# Patient Record
Sex: MALE | Race: WHITE | NOT HISPANIC OR LATINO | Employment: OTHER | URBAN - METROPOLITAN AREA
[De-identification: names, ages, dates, MRNs, and addresses within clinical notes are randomized per-mention and may not be internally consistent; named-entity substitution may affect disease eponyms.]

---

## 2017-02-10 ENCOUNTER — TELEPHONE (OUTPATIENT)
Dept: UROLOGY | Facility: CLINIC | Age: 57
End: 2017-02-10

## 2017-02-10 NOTE — TELEPHONE ENCOUNTER
Attempted to contact pt to change appt from [facility] to [facility] as Urology will no longer rotate to [facility]; no answer; msg left on voice mail.

## 2017-05-05 ENCOUNTER — LAB VISIT (OUTPATIENT)
Dept: LAB | Facility: HOSPITAL | Age: 57
End: 2017-05-05
Attending: UROLOGY
Payer: OTHER GOVERNMENT

## 2017-05-05 ENCOUNTER — OFFICE VISIT (OUTPATIENT)
Dept: UROLOGY | Facility: CLINIC | Age: 57
End: 2017-05-05
Payer: OTHER GOVERNMENT

## 2017-05-05 VITALS
HEIGHT: 69 IN | SYSTOLIC BLOOD PRESSURE: 112 MMHG | DIASTOLIC BLOOD PRESSURE: 74 MMHG | BODY MASS INDEX: 30.72 KG/M2 | WEIGHT: 207.44 LBS

## 2017-05-05 DIAGNOSIS — Z12.5 PROSTATE CANCER SCREENING: Primary | ICD-10-CM

## 2017-05-05 DIAGNOSIS — Z12.5 PROSTATE CANCER SCREENING: ICD-10-CM

## 2017-05-05 LAB
BILIRUB SERPL-MCNC: NORMAL MG/DL
BLOOD URINE, POC: NORMAL
COLOR, POC UA: YELLOW
COMPLEXED PSA SERPL-MCNC: 1.2 NG/ML
GLUCOSE UR QL STRIP: NORMAL
KETONES UR QL STRIP: NORMAL
LEUKOCYTE ESTERASE URINE, POC: NORMAL
NITRITE, POC UA: NORMAL
PH, POC UA: 6
PROTEIN, POC: NORMAL
SPECIFIC GRAVITY, POC UA: 1.01
UROBILINOGEN, POC UA: NORMAL

## 2017-05-05 PROCEDURE — 99212 OFFICE O/P EST SF 10 MIN: CPT | Mod: PBBFAC | Performed by: UROLOGY

## 2017-05-05 PROCEDURE — 99244 OFF/OP CNSLTJ NEW/EST MOD 40: CPT | Mod: S$PBB,,, | Performed by: UROLOGY

## 2017-05-05 PROCEDURE — 81002 URINALYSIS NONAUTO W/O SCOPE: CPT | Mod: PBBFAC | Performed by: UROLOGY

## 2017-05-05 PROCEDURE — 84153 ASSAY OF PSA TOTAL: CPT

## 2017-05-05 PROCEDURE — 36415 COLL VENOUS BLD VENIPUNCTURE: CPT

## 2017-05-05 PROCEDURE — 99999 PR PBB SHADOW E&M-EST. PATIENT-LVL II: CPT | Mod: PBBFAC,,, | Performed by: UROLOGY

## 2017-05-05 NOTE — PROGRESS NOTES
Chief Complaint: Prostate Cancer Screening    HPI:   5/5/17: 55 yo man referred by Dr. Dominguez after abnormal YVONNE.  No abd/pelvic pain and no exac/rel factors.  No hematuria.  No urolithiasis.  No urinary bother.  No  history.  Normal sexual function.    Allergies:  Review of patient's allergies indicates no known allergies.    Medications:  currently has no medications in their medication list.    Review of Systems:  General: No fever, chills, fatigability, or weight loss.  Skin: No rashes, itching, or changes in color or texture of skin.  Chest: Denies DONALDSON, cyanosis, wheezing, cough, and sputum production.  Abdomen: Appetite fine. No weight loss. Denies diarrhea, abdominal pain, hematemesis, or blood in stool.  Musculoskeletal: No joint stiffness or swelling. Denies back pain.  : As above.  All other review of systems negative.    PMH:   has a past medical history of Colon polyp and Hyperlipidemia.    PSH:   has a past surgical history that includes Anterior cruciate ligament repair (Right, 1999); Finger fracture surgery; and Colonoscopy (N/A, 12/6/2016).    FamHx: family history includes Cerebral aneurysm in his mother; Skin cancer in his mother.    SocHx:  reports that he has never smoked. He does not have any smokeless tobacco history on file. He reports that he drinks alcohol. He reports that he does not use illicit drugs.      Physical Exam:  Vitals:    05/05/17 1447   BP: 112/74     General: A&Ox3, no apparent distress, no deformities  Neck: No masses, normal thyroid  Lungs: normal inspiration, no use of accessory muscles  Heart: normal pulse, no arrhythmias  Abdomen: Soft, NT, ND, no masses, no hernias, no hepatosplenomegaly  Lymphatic: Neck and groin nodes negative  Skin: The skin is warm and dry. No jaundice.  Ext: No c/c/e.  : Test desc debra, no abnormalities of epididymus. Penis normal, with normal penile and scrotal skin. Meatus normal. Normal rectal tone, no hemorrhoids. Prost 30 gm no nodules or  masses appreciated. Does have a more firm feature to the right side and kind of a wing to it. SV not palpable. Perineum and anus normal.    Labs/Studies: Urinalysis performed in clinic, summary: UA normal  PSA    11/16: 1.2    Impression/Plan:   1. Not convinced there is a nodule needing biopsy.  Will get PSA now and in 6 mo with YVONNE then to see if any different.

## 2017-05-05 NOTE — MR AVS SNAPSHOT
[facility]  [address]  [place] [address]  Phone: [phone]  Fax: [phone]                  Danny Chatman   2017 2:40 PM   Office Visit    Description:  Male : 1960   Provider:  Bryce Cid IV, MD   Department:  [facility]           Reason for Visit     Other           Diagnoses this Visit        Comments    Prostate cancer screening    -  Primary            To Do List           Future Appointments        Provider Department Dept Phone    2017 4:50 PM LABORATORY, [address] [facility] [phone]    2017 9:35 AM LABORATORY, [place] [facility] [phone]    2017 2:00 PM Bryce Cid IV, MD [facility] [phone]      Goals (5 Years of Data)     None      Follow-Up and Disposition     Return in about 6 months (around 2017).    Follow-up and Disposition History      [facility]     [facility] Nurse Care Line -  Assistance  Unless otherwise directed by your provider, please contact [facility], our nurse care line that is available for  assistance.     Registered nurses in the [facility] provide: appointment scheduling, clinical advisement, health education, and other advisory services.  Call: [phone] (toll free)               Medications           Message regarding Medications     Verify the changes and/or additions to your medication regime listed below are the same as discussed with your clinician today.  If any of these changes or additions are incorrect, please notify your healthcare provider.             Verify that the below list of medications is an accurate representation of the medications you are currently taking.  If none reported, the list may be blank. If incorrect, please contact your healthcare provider. Carry this list with you in case of emergency.                Clinical Reference Information           Your Vitals Were  "    BP Height Weight BMI       112/74 (BP Location: Left arm, Patient Position: Sitting) 5' 9" (1.753 m) 94.1 kg (207 lb 7.3 oz) 30.64 kg/m2       Blood Pressure          Most Recent Value    BP  112/74      Allergies as of 5/5/2017     No Known Allergies      Immunizations Administered on Date of Encounter - 5/5/2017     None      Orders Placed During Today's Visit     Future Labs/Procedures Expected by Expires    PSA, Screening  5/5/2018 7/4/2018    PSA, Screening  5/5/2018 7/4/2018      Language Assistance Services     ATTENTION: Language assistance services are available, free of charge. Please call [phone].      ATENCIÓN: Si dionnela keely, tiene a osei disposición servicios gratuitos de asistencia lingüística. Llame al [phone].     CHÚ Ý: N?u b?n nói Ti?ng Vi?t, có các d?ch v? h? tr? ngôn ng? mi?n phí dành cho b?n. G?i s? [phone].         [facility] complies with applicable Federal civil rights laws and does not discriminate on the basis of race, color, national origin, age, disability, or sex.        "

## 2017-05-05 NOTE — LETTER
May 5, 2017      Everett Dominguez MD  [address] [address] [address]           [facility]  [address] [place] [address]  Phone: [phone]  Fax: [phone]          Patient: Danny Chatman   MR Number: [id number]   YOB: 1960   Date of Visit: 5/5/2017       Dear Dr. Everett Dominguez:    Thank you for referring Danny Chatman to me for evaluation. Attached you will find relevant portions of my assessment and plan of care.    If you have questions, please do not hesitate to call me. I look forward to following Danny Chatman along with you.    Sincerely,    Bryce Cid IV, MD    Enclosure  CC:  No Recipients    If you would like to receive this communication electronically, please contact [email or web address] or [phone] to request more information on [employer] Link access.    For providers and/or their staff who would like to refer a patient to [facility], please contact us through our one-stop-shop provider referral line, [facility] , at [phone].    If you feel you have received this communication in error or would no longer like to receive these types of communications, please e-mail [email or web address]

## 2017-11-02 ENCOUNTER — PATIENT MESSAGE (OUTPATIENT)
Dept: UROLOGY | Facility: CLINIC | Age: 57
End: 2017-11-02

## 2017-11-10 ENCOUNTER — LAB VISIT (OUTPATIENT)
Dept: LAB | Facility: HOSPITAL | Age: 57
End: 2017-11-10
Attending: UROLOGY
Payer: OTHER GOVERNMENT

## 2017-11-10 DIAGNOSIS — Z12.5 PROSTATE CANCER SCREENING: ICD-10-CM

## 2017-11-10 LAB — COMPLEXED PSA SERPL-MCNC: 1 NG/ML

## 2017-11-10 PROCEDURE — 36415 COLL VENOUS BLD VENIPUNCTURE: CPT | Mod: PO

## 2017-11-10 PROCEDURE — 84153 ASSAY OF PSA TOTAL: CPT

## 2017-11-14 ENCOUNTER — OFFICE VISIT (OUTPATIENT)
Dept: UROLOGY | Facility: CLINIC | Age: 57
End: 2017-11-14
Payer: OTHER GOVERNMENT

## 2017-11-14 VITALS
SYSTOLIC BLOOD PRESSURE: 100 MMHG | HEART RATE: 70 BPM | BODY MASS INDEX: 30.33 KG/M2 | DIASTOLIC BLOOD PRESSURE: 88 MMHG | HEIGHT: 69 IN | WEIGHT: 204.81 LBS

## 2017-11-14 DIAGNOSIS — Z12.5 PROSTATE CANCER SCREENING: Primary | ICD-10-CM

## 2017-11-14 LAB
BILIRUB SERPL-MCNC: NORMAL MG/DL
BLOOD URINE, POC: NORMAL
COLOR, POC UA: YELLOW
GLUCOSE UR QL STRIP: NORMAL
KETONES UR QL STRIP: NORMAL
LEUKOCYTES: NORMAL
NITRITE, POC UA: NORMAL
PH, POC UA: 6
PROTEIN, POC: NORMAL
SPECIFIC GRAVITY, POC UA: 1.02
UROBILINOGEN, POC UA: NORMAL

## 2017-11-14 PROCEDURE — 81002 URINALYSIS NONAUTO W/O SCOPE: CPT | Mod: PBBFAC | Performed by: NURSE PRACTITIONER

## 2017-11-14 PROCEDURE — 99999 PR PBB SHADOW E&M-EST. PATIENT-LVL III: CPT | Mod: PBBFAC,,, | Performed by: NURSE PRACTITIONER

## 2017-11-14 PROCEDURE — 99213 OFFICE O/P EST LOW 20 MIN: CPT | Mod: S$PBB,,, | Performed by: NURSE PRACTITIONER

## 2017-11-14 PROCEDURE — 99213 OFFICE O/P EST LOW 20 MIN: CPT | Mod: PBBFAC | Performed by: NURSE PRACTITIONER

## 2017-11-14 NOTE — PROGRESS NOTES
Chief Complaint: Prostate Cancer Screening    HPI:   11/14/17: Laws: 55 y/o M patient of Dr. Cid here for 6 month f/u of abnormal prostate exam. Reviewed PSA trend; normal numbers (1.2, 1.0). Has slow stream at times. Nocturia 1-2x. No abd/pelvic pain and no exac/rel factors.  No hematuria.  No urolithiasis.    5/5/17: 57 yo man referred by Dr. Dominguez after abnormal YVONNE.  No abd/pelvic pain and no exac/rel factors.  No hematuria.  No urolithiasis.  No urinary bother.  No  history.  Normal sexual function.    Allergies:  Patient has no known allergies.    Medications:  currently has no medications in their medication list.    Review of Systems:  General: No fever, chills, fatigability, or weight loss.  Skin: No rashes, itching, or changes in color or texture of skin.  Chest: Denies DONALDSON, cyanosis, wheezing, cough, and sputum production.  Abdomen: Appetite fine. No weight loss. Denies diarrhea, abdominal pain, hematemesis, or blood in stool.  Musculoskeletal: No joint stiffness or swelling. Denies back pain.  : As above.  All other review of systems negative.    PMH:   has a past medical history of Colon polyp and Hyperlipidemia.    PSH:   has a past surgical history that includes Anterior cruciate ligament repair (Right, 1999); Finger fracture surgery; and Colonoscopy (N/A, 12/6/2016).    FamHx: family history includes Cerebral aneurysm in his mother; Skin cancer in his mother.    SocHx:  reports that he has never smoked. He does not have any smokeless tobacco history on file. He reports that he drinks alcohol. He reports that he does not use drugs.      Physical Exam:  There were no vitals filed for this visit.  General: A&Ox3, no apparent distress, no deformities  Neck: No masses, normal thyroid  Lungs: normal inspiration, no use of accessory muscles  Heart: normal pulse, no arrhythmias  Abdomen: Soft, NT, ND, no masses, no hernias, no hepatosplenomegaly  Lymphatic: Neck and groin nodes negative  Skin: The  skin is warm and dry. No jaundice.  Ext: No c/c/e.  : 11/14/17: Test desc debra, no abnormalities of epididymus. Penis normal, with normal penile and scrotal skin. Meatus normal. Normal rectal tone, no hemorrhoids. Prost 30 gm no nodules or masses appreciated. Does have a more firm feature to the right side and kind of a wing to it. SV not palpable. Perineum and anus normal.    Labs/Studies: Urinalysis performed in clinic, summary: UA normal  PSA    11/16: 1.2    5/17: 1.2    11/17: 1.0    Impression/Plan:   1. Exam unchanged to me. Recommend PSA 6 months and RTC 6 months with Dr. Cid.

## 2018-01-24 ENCOUNTER — PATIENT OUTREACH (OUTPATIENT)
Dept: ADMINISTRATIVE | Facility: HOSPITAL | Age: 58
End: 2018-01-24

## 2018-01-25 ENCOUNTER — OFFICE VISIT (OUTPATIENT)
Dept: OPHTHALMOLOGY | Facility: CLINIC | Age: 58
End: 2018-01-25
Payer: OTHER GOVERNMENT

## 2018-01-25 ENCOUNTER — OFFICE VISIT (OUTPATIENT)
Dept: FAMILY MEDICINE | Facility: CLINIC | Age: 58
End: 2018-01-25
Payer: OTHER GOVERNMENT

## 2018-01-25 VITALS
WEIGHT: 211.56 LBS | SYSTOLIC BLOOD PRESSURE: 97 MMHG | TEMPERATURE: 98 F | DIASTOLIC BLOOD PRESSURE: 57 MMHG | BODY MASS INDEX: 31.33 KG/M2 | HEART RATE: 60 BPM | HEIGHT: 69 IN

## 2018-01-25 DIAGNOSIS — D31.32 CHOROIDAL NEVUS, LEFT: ICD-10-CM

## 2018-01-25 DIAGNOSIS — R39.89 ABNORMAL PROSTATE EXAM: ICD-10-CM

## 2018-01-25 DIAGNOSIS — H52.7 REFRACTIVE ERROR: ICD-10-CM

## 2018-01-25 DIAGNOSIS — D31.32 CHOROIDAL NEVUS OF LEFT EYE: ICD-10-CM

## 2018-01-25 DIAGNOSIS — H25.13 NUCLEAR SCLEROSIS, BILATERAL: ICD-10-CM

## 2018-01-25 DIAGNOSIS — Z01.00 ENCOUNTER FOR EYE EXAM: Primary | ICD-10-CM

## 2018-01-25 DIAGNOSIS — Z00.00 ROUTINE HISTORY AND PHYSICAL EXAMINATION OF ADULT: Primary | ICD-10-CM

## 2018-01-25 PROCEDURE — 90686 IIV4 VACC NO PRSV 0.5 ML IM: CPT | Mod: PBBFAC,PO

## 2018-01-25 PROCEDURE — 99999 PR PBB SHADOW E&M-EST. PATIENT-LVL II: CPT | Mod: PBBFAC,,, | Performed by: OPHTHALMOLOGY

## 2018-01-25 PROCEDURE — 99212 OFFICE O/P EST SF 10 MIN: CPT | Mod: PBBFAC,27,PO | Performed by: OPHTHALMOLOGY

## 2018-01-25 PROCEDURE — 90472 IMMUNIZATION ADMIN EACH ADD: CPT | Mod: PBBFAC,PO

## 2018-01-25 PROCEDURE — 92014 COMPRE OPH EXAM EST PT 1/>: CPT | Mod: S$PBB,,, | Performed by: OPHTHALMOLOGY

## 2018-01-25 PROCEDURE — 92015 DETERMINE REFRACTIVE STATE: CPT | Mod: ,,, | Performed by: OPHTHALMOLOGY

## 2018-01-25 PROCEDURE — 99396 PREV VISIT EST AGE 40-64: CPT | Mod: S$PBB,,, | Performed by: FAMILY MEDICINE

## 2018-01-25 PROCEDURE — 99213 OFFICE O/P EST LOW 20 MIN: CPT | Mod: PBBFAC,PO,25 | Performed by: FAMILY MEDICINE

## 2018-01-25 PROCEDURE — 99999 PR PBB SHADOW E&M-EST. PATIENT-LVL III: CPT | Mod: PBBFAC,,, | Performed by: FAMILY MEDICINE

## 2018-01-25 NOTE — PROGRESS NOTES
Patient presents physical exam.  Needs follow-up ophthalmology regarding choroidal nevus.  Has follow-up pending urology in the spring regarding mildly abnormal prostate exam with normal PSAs    Past Medical History:  Past Medical History:   Diagnosis Date    Cataract     Choroidal nevus of left eye 1/25/2018    Colon polyp     Hyperlipidemia      Past Surgical History:   Procedure Laterality Date    ANTERIOR CRUCIATE LIGAMENT REPAIR Right 1999    COLONOSCOPY N/A 12/6/2016    Procedure: COLONOSCOPY;  Surgeon: Lucho Vera MD;  Location: [facility];  Service: Endoscopy;  Laterality: N/A;    FINGER FRACTURE SURGERY      R index     Social History     Social History    Marital status:      Spouse name: N/A    Number of children: N/A    Years of education: N/A     Occupational History    Not on file.     Social History Main Topics    Smoking status: Never Smoker    Smokeless tobacco: Never Used    Alcohol use 0.0 oz/week      Comment: rare    Drug use: No    Sexual activity: Not on file     Other Topics Concern    Not on file     Social History Narrative    No narrative on file     Family History   Problem Relation Age of Onset    Skin cancer Mother     Cerebral aneurysm Mother     No Known Problems Maternal Grandmother     No Known Problems Maternal Grandfather     No Known Problems Paternal Grandmother     No Known Problems Paternal Grandfather     Amblyopia Neg Hx     Blindness Neg Hx     Cancer Neg Hx     Cataracts Neg Hx     Glaucoma Neg Hx     Diabetes Neg Hx     Hypertension Neg Hx     Macular degeneration Neg Hx     Retinal detachment Neg Hx     Strabismus Neg Hx     Stroke Neg Hx     Thyroid disease Neg Hx      No Known Allergies  No current outpatient prescriptions on file prior to visit.     No current facility-administered medications on file prior to visit.          ROS:  GENERAL: No fever, chills,  or significant weight changes.  HEENT: No headache or hearing  "complaints.  No dysphagia  Eyes: No vision complaints  CHEST: Denies DONALDSON, cyanosis, wheezing, cough and sputum production.  CARDIOVASCULAR: Denies chest pain, PND, orthopnea or reduced exercise tolerance.  ABDOMEN: Appetite fine. Denies diarrhea, abdominal pain, hematemesis or blood in stool.  URINARY: No flank pain, dysuria or hematuria.  MUSCULOSKELETAL: No warmth swelling or tenderness of the joints  NEUROLOGIC: No focal weakness numbness or paresthesia  PSYCHIATRIC: Denies depression        OBJECTIVE:   Vitals:    01/25/18 0948   BP: (!) 97/57   Pulse: 60   Temp: 97.8 °F (36.6 °C)   Weight: 95.9 kg (211 lb 8.5 oz)   Height: 5' 9" (1.753 m)       Wt Readings from Last 3 Encounters:   01/25/18 95.9 kg (211 lb 8.5 oz)   11/14/17 92.9 kg (204 lb 12.9 oz)   05/05/17 94.1 kg (207 lb 7.3 oz)       APPEARANCE: Well nourished, well developed, in no acute distress.    HEAD: Normocephalic.  Atraumatic.  No sinus tenderness.  EYES:   Right eye: Pupil reactive.  Conjunctiva clear.    Left eye: Pupil reactive.  Conjunctiva clear.    Both fundi:  Grossly normal to nondilated exam. EOMI.    EARS: TM's intact. Light reflex normal. No retraction or perforation.    NOSE:  clear.  MOUTH & THROAT:  No pharyngeal erythema or exudate. No lesions.  NECK: Supple. No bruits.  No JVD.  No cervical lymphadenopathy.  No thyromegaly.    CHEST: Breath sounds clear bilaterally.  Normal respiratory effort  CARDIOVASCULAR: Normal rate.  Regular rhythm.  No murmurs.  No rub.  No gallops.  ABDOMEN: Bowel sounds normal.  Soft.  No tenderness.  No organomegaly.  PERIPHERAL VASCULAR: No cyanosis.  No clubbing.  No edema.  NEUROLOGIC: No focal findings.  MENTAL STATUS: Alert.  Oriented x 3.        Danny was seen today for annual exam.    Diagnoses and all orders for this visit:    Routine history and physical examination of adult    Choroidal nevus of left eye  -     Ambulatory referral to Ophthalmology    Abnormal prostate exam  -     Ambulatory " referral to Urology    Other orders  -     Tdap Vaccine; Future  -     Influenza - Quadrivalent (3 years & older) (PF)  -     Tdap Vaccine    Anticipatory guidance: Don't smoke.  Healthy diet and regular exercise recommended.

## 2018-01-25 NOTE — LETTER
January 25, 2018      Everett Dominguez MD  [address] [address]           [facility]  [address] [facility] [address] [place] [address]  Phone: [phone]  Fax: [phone]          Patient: Danny Chatman   MR Number: [id number]   YOB: 1960   Date of Visit: 1/25/2018       Dear Dr. Everett Dominguez:    Thank you for referring Danny Chatman to me for evaluation. Attached you will find relevant portions of my assessment and plan of care.    If you have questions, please do not hesitate to call me. I look forward to following Danny Chatman along with you.    Sincerely,    Bethany Benjamin MD    Enclosure  CC:  No Recipients    If you would like to receive this communication electronically, please contact [email or web address] or [phone] to request more information on [employer] Link access.    For providers and/or their staff who would like to refer a patient to [facility], please contact us through our one-stop-shop provider referral line, [facility], at [phone].    If you feel you have received this communication in error or would no longer like to receive these types of communications, please e-mail [email or web address]          09-Dec-2017 21:05

## 2018-02-08 ENCOUNTER — OFFICE VISIT (OUTPATIENT)
Dept: FAMILY MEDICINE | Facility: CLINIC | Age: 58
End: 2018-02-08
Payer: OTHER GOVERNMENT

## 2018-02-08 VITALS
HEART RATE: 96 BPM | WEIGHT: 207 LBS | DIASTOLIC BLOOD PRESSURE: 70 MMHG | TEMPERATURE: 99 F | SYSTOLIC BLOOD PRESSURE: 121 MMHG | HEIGHT: 69 IN | BODY MASS INDEX: 30.66 KG/M2

## 2018-02-08 DIAGNOSIS — J06.9 UPPER RESPIRATORY TRACT INFECTION, UNSPECIFIED TYPE: ICD-10-CM

## 2018-02-08 DIAGNOSIS — J02.9 PHARYNGITIS, UNSPECIFIED ETIOLOGY: Primary | ICD-10-CM

## 2018-02-08 DIAGNOSIS — R68.83 CHILLS: ICD-10-CM

## 2018-02-08 DIAGNOSIS — R05.9 COUGH: ICD-10-CM

## 2018-02-08 LAB
FLUAV AG SPEC QL IA: NEGATIVE
FLUBV AG SPEC QL IA: NEGATIVE
SPECIMEN SOURCE: NORMAL

## 2018-02-08 PROCEDURE — 3008F BODY MASS INDEX DOCD: CPT | Mod: ,,, | Performed by: NURSE PRACTITIONER

## 2018-02-08 PROCEDURE — 87400 INFLUENZA A/B EACH AG IA: CPT | Mod: 59,PO

## 2018-02-08 PROCEDURE — 99999 PR PBB SHADOW E&M-EST. PATIENT-LVL III: CPT | Mod: PBBFAC,,, | Performed by: NURSE PRACTITIONER

## 2018-02-08 PROCEDURE — 99213 OFFICE O/P EST LOW 20 MIN: CPT | Mod: PBBFAC,PO | Performed by: NURSE PRACTITIONER

## 2018-02-08 PROCEDURE — 99213 OFFICE O/P EST LOW 20 MIN: CPT | Mod: S$PBB,,, | Performed by: NURSE PRACTITIONER

## 2018-02-08 RX ORDER — BENZONATATE 200 MG/1
200 CAPSULE ORAL 3 TIMES DAILY PRN
Qty: 30 CAPSULE | Refills: 0 | Status: SHIPPED | OUTPATIENT
Start: 2018-02-08 | End: 2018-02-18

## 2018-02-08 RX ORDER — BENZONATATE 200 MG/1
200 CAPSULE ORAL 3 TIMES DAILY PRN
Qty: 30 CAPSULE | Refills: 0 | Status: SHIPPED | OUTPATIENT
Start: 2018-02-08 | End: 2018-02-08 | Stop reason: SDUPTHER

## 2018-02-08 RX ORDER — PROMETHAZINE HYDROCHLORIDE AND DEXTROMETHORPHAN HYDROBROMIDE 6.25; 15 MG/5ML; MG/5ML
5 SYRUP ORAL 2 TIMES DAILY PRN
Qty: 118 ML | Refills: 0 | Status: SHIPPED | OUTPATIENT
Start: 2018-02-08 | End: 2018-02-18

## 2018-02-08 RX ORDER — PROMETHAZINE HYDROCHLORIDE AND DEXTROMETHORPHAN HYDROBROMIDE 6.25; 15 MG/5ML; MG/5ML
5 SYRUP ORAL 2 TIMES DAILY PRN
Qty: 118 ML | Refills: 0 | Status: SHIPPED | OUTPATIENT
Start: 2018-02-08 | End: 2018-02-08 | Stop reason: SDUPTHER

## 2018-02-08 RX ORDER — AZITHROMYCIN 250 MG/1
TABLET, FILM COATED ORAL
Qty: 6 TABLET | Refills: 0 | Status: SHIPPED | OUTPATIENT
Start: 2018-02-08 | End: 2018-02-13

## 2018-02-08 NOTE — PROGRESS NOTES
Subjective:       Patient ID: Danny Chatman is a 57 y.o. male.    Chief Complaint: Cough; Chills; Fever; Nasal Congestion; Chest Congestion; and Sore Throat    Cough   This is a new problem. The current episode started in the past 7 days. The problem has been unchanged. The problem occurs every few minutes. The cough is productive of sputum. Associated symptoms include chills, nasal congestion, postnasal drip, rhinorrhea and a sore throat. Pertinent negatives include no chest pain, ear congestion, ear pain, fever, headaches, heartburn, hemoptysis, myalgias, rash, shortness of breath, sweats, weight loss or wheezing. Nothing aggravates the symptoms. He has tried nothing for the symptoms. The treatment provided no relief. There is no history of asthma, bronchiectasis, bronchitis, COPD, emphysema, environmental allergies or pneumonia.     Past Medical History:   Diagnosis Date    Cataract     Choroidal nevus of left eye 1/25/2018    Colon polyp     Hyperlipidemia      Social History     Social History    Marital status:      Spouse name: N/A    Number of children: N/A    Years of education: N/A     Occupational History    Not on file.     Social History Main Topics    Smoking status: Never Smoker    Smokeless tobacco: Never Used    Alcohol use 0.0 oz/week      Comment: rare    Drug use: No    Sexual activity: Not on file     Social History Narrative    No narrative on file     Past Surgical History:   Procedure Laterality Date    ANTERIOR CRUCIATE LIGAMENT REPAIR Right 1999    COLONOSCOPY N/A 12/6/2016    Procedure: COLONOSCOPY;  Surgeon: Lucho Vera MD;  Location: [facility];  Service: Endoscopy;  Laterality: N/A;    FINGER FRACTURE SURGERY      R index       Review of Systems   Constitutional: Positive for chills. Negative for fever and weight loss.   HENT: Positive for postnasal drip, rhinorrhea and sore throat. Negative for ear pain.    Eyes: Negative.    Respiratory: Positive for  cough. Negative for hemoptysis, shortness of breath and wheezing.    Cardiovascular: Negative.  Negative for chest pain.   Gastrointestinal: Negative.  Negative for heartburn.   Endocrine: Negative.    Genitourinary: Negative.    Musculoskeletal: Negative.  Negative for myalgias.   Skin: Negative.  Negative for rash.   Allergic/Immunologic: Negative.  Negative for environmental allergies.   Neurological: Negative.  Negative for headaches.   Psychiatric/Behavioral: Negative.        Objective:      Physical Exam   Constitutional: He is oriented to person, place, and time. He appears well-developed and well-nourished.   HENT:   Head: Normocephalic.   Right Ear: Hearing, tympanic membrane, external ear and ear canal normal.   Left Ear: Hearing, tympanic membrane, external ear and ear canal normal.   Nose: Rhinorrhea present. Right sinus exhibits no maxillary sinus tenderness and no frontal sinus tenderness. Left sinus exhibits no maxillary sinus tenderness and no frontal sinus tenderness.   Mouth/Throat: Uvula is midline and mucous membranes are normal. Posterior oropharyngeal erythema present.   Eyes: Conjunctivae are normal. Pupils are equal, round, and reactive to light.   Neck: Normal range of motion. Neck supple.   Cardiovascular: Normal rate, regular rhythm and normal heart sounds.    Pulmonary/Chest: Effort normal and breath sounds normal.   Abdominal: Soft. Bowel sounds are normal.   Musculoskeletal: Normal range of motion.   Neurological: He is alert and oriented to person, place, and time.   Skin: Skin is warm and dry. Capillary refill takes 2 to 3 seconds.   Psychiatric: He has a normal mood and affect. His behavior is normal. Judgment and thought content normal.   Nursing note and vitals reviewed.      Assessment:       1. Pharyngitis, unspecified etiology    2. Cough    3. Chills    4.      Upper respiratory tract infection, unspecified type    Plan:           Danny was seen today for cough, chills,  fever, nasal congestion, chest congestion and sore throat.    Diagnoses and all orders for this visit:  Upper respiratory tract infection, unspecified type  Pharyngitis, unspecified etiology  Cough  Chills  -     Influenza antigen Nasal Swab  -     benzonatate (TESSALON) 200 MG capsule; Take 1 capsule (200 mg total) by mouth 3 (three) times daily as needed.  -     promethazine-dextromethorphan (PROMETHAZINE-DM) 6.25-15 mg/5 mL Syrp; Take 5 mLs by mouth 2 (two) times daily as needed.  -     azithromycin (Z-SARAH) 250 MG tablet; Take 2 tablets by mouth on day 1; Take 1 tablet by mouth on days 2-5  Hydrate well  Rest  Report to ER immediately if symptoms worsen

## 2018-04-07 ENCOUNTER — PATIENT MESSAGE (OUTPATIENT)
Dept: FAMILY MEDICINE | Facility: CLINIC | Age: 58
End: 2018-04-07

## 2018-04-07 ENCOUNTER — PATIENT MESSAGE (OUTPATIENT)
Dept: UROLOGY | Facility: CLINIC | Age: 58
End: 2018-04-07

## 2018-04-24 ENCOUNTER — PATIENT MESSAGE (OUTPATIENT)
Dept: FAMILY MEDICINE | Facility: CLINIC | Age: 58
End: 2018-04-24

## 2018-05-03 ENCOUNTER — TELEPHONE (OUTPATIENT)
Dept: FAMILY MEDICINE | Facility: CLINIC | Age: 58
End: 2018-05-03

## 2018-05-03 ENCOUNTER — LAB VISIT (OUTPATIENT)
Dept: LAB | Facility: HOSPITAL | Age: 58
End: 2018-05-03
Attending: UROLOGY
Payer: OTHER GOVERNMENT

## 2018-05-03 DIAGNOSIS — Z12.5 PROSTATE CANCER SCREENING: ICD-10-CM

## 2018-05-03 LAB — COMPLEXED PSA SERPL-MCNC: 1.2 NG/ML

## 2018-05-03 PROCEDURE — 36415 COLL VENOUS BLD VENIPUNCTURE: CPT | Mod: PO

## 2018-05-03 PROCEDURE — 84153 ASSAY OF PSA TOTAL: CPT

## 2018-05-03 NOTE — TELEPHONE ENCOUNTER
Wife informed referral is in EPIC but was not authorized, will send to referral department to authorize.  Informed anything ordered from visit with Dr Cid would have to be authorized by his staff.

## 2018-05-03 NOTE — TELEPHONE ENCOUNTER
----- Message from Kimberly Reynoso sent at 5/3/2018  8:37 AM CDT -----  Contact: pt spouse Caridad Mclean state the patient have  insurance and they are advising that they need a referral and authorization for patient lab appt on today and appt on 5/14/18 with Dr. Cid. Please adv/call [phone].[email or web address]

## 2018-05-09 ENCOUNTER — PATIENT MESSAGE (OUTPATIENT)
Dept: UROLOGY | Facility: CLINIC | Age: 58
End: 2018-05-09

## 2018-05-14 ENCOUNTER — OFFICE VISIT (OUTPATIENT)
Dept: UROLOGY | Facility: CLINIC | Age: 58
End: 2018-05-14
Payer: OTHER GOVERNMENT

## 2018-05-14 DIAGNOSIS — N40.0 BENIGN PROSTATIC HYPERPLASIA, UNSPECIFIED WHETHER LOWER URINARY TRACT SYMPTOMS PRESENT: Primary | ICD-10-CM

## 2018-05-14 LAB
BILIRUB SERPL-MCNC: NORMAL MG/DL
BLOOD URINE, POC: NORMAL
COLOR, POC UA: YELLOW
GLUCOSE UR QL STRIP: NORMAL
KETONES UR QL STRIP: NORMAL
LEUKOCYTE ESTERASE URINE, POC: NORMAL
NITRITE, POC UA: NORMAL
PH, POC UA: 5
PROTEIN, POC: NORMAL
SPECIFIC GRAVITY, POC UA: 1.02
UROBILINOGEN, POC UA: NORMAL

## 2018-05-14 PROCEDURE — 81002 URINALYSIS NONAUTO W/O SCOPE: CPT | Mod: PBBFAC | Performed by: UROLOGY

## 2018-05-14 PROCEDURE — 99214 OFFICE O/P EST MOD 30 MIN: CPT | Mod: S$PBB,,, | Performed by: UROLOGY

## 2018-05-14 PROCEDURE — 99999 PR PBB SHADOW E&M-EST. PATIENT-LVL I: CPT | Mod: PBBFAC,,, | Performed by: UROLOGY

## 2018-05-14 PROCEDURE — 99211 OFF/OP EST MAY X REQ PHY/QHP: CPT | Mod: PBBFAC | Performed by: UROLOGY

## 2018-05-14 NOTE — PROGRESS NOTES
Chief Complaint: Prostate Cancer Screening    HPI:   5/14/18: PSA unchanged, no new problems.  Reviewed history in detail.  Brother has prostate cancer, 5 yrs older; discussed.  11/14/17: Laws: 57 y/o M patient of Dr. Cid here for 6 month f/u of abnormal prostate exam. Reviewed PSA trend; normal numbers (1.2, 1.0). Has slow stream at times. Nocturia 1-2x. No abd/pelvic pain and no exac/rel factors.  No hematuria.  No urolithiasis.    5/5/17: 57 yo man referred by Dr. Dominguez after abnormal YVONNE.  No abd/pelvic pain and no exac/rel factors.  No hematuria.  No urolithiasis.  No urinary bother.  No  history.  Normal sexual function.    Allergies:  Patient has no known allergies.    Medications:  has a current medication list which includes the following prescription(s): multivit-min/fa/lycopen/lutein.    Review of Systems:  General: No fever, chills, fatigability, or weight loss.  Skin: No rashes, itching, or changes in color or texture of skin.  Chest: Denies DONALDSON, cyanosis, wheezing, cough, and sputum production.  Abdomen: Appetite fine. No weight loss. Denies diarrhea, abdominal pain, hematemesis, or blood in stool.  Musculoskeletal: No joint stiffness or swelling. Denies back pain.  : As above.  All other review of systems negative.    PMH:   has a past medical history of Cataract; Choroidal nevus of left eye (1/25/2018); Colon polyp; and Hyperlipidemia.    PSH:   has a past surgical history that includes Anterior cruciate ligament repair (Right, 1999); Finger fracture surgery; Colonoscopy (N/A, 12/6/2016); and Vasectomy (1989).    FamHx: family history includes Cerebral aneurysm in his mother; No Known Problems in his maternal grandfather, maternal grandmother, paternal grandfather, and paternal grandmother; Skin cancer in his mother.    SocHx:  reports that he has never smoked. He has never used smokeless tobacco. He reports that he drinks alcohol. He reports that he does not use drugs.      Physical  Exam:  There were no vitals filed for this visit.  General: A&Ox3, no apparent distress, no deformities  Neck: No masses, normal thyroid  Lungs: normal inspiration, no use of accessory muscles  Heart: normal pulse, no arrhythmias  Abdomen: Soft, NT, ND  Skin: The skin is warm and dry. No jaundice.  Ext: No c/c/e.  :   5/14/18: Test desc debra, no abnormalities of epididymus. Penis normal, with normal penile and scrotal skin. Meatus normal. Normal rectal tone, no hemorrhoids. Prost 30 gm no nodules or masses appreciated. Does have a more firm feature to the right side and kind of a wing to it. SV not palpable. Perineum and anus normal.    Labs/Studies: Urinalysis performed in clinic, summary: UA normal  PSA    11/16: 1.2    5/17: 1.2    11/17: 1.0    5/18: 1.2    Impression/Plan:   1. Exam unchanged to me, again.  PSA/RTC 12 mo.

## 2018-05-14 NOTE — LETTER
May 14, 2018      Everett Dominguez MD  [address] [facility] [address]           [facility] Urology  [address] [place] [address]  Phone: [phone]  Fax: [phone]          Patient: Danny Chatman   MR Number: [id number]   YOB: 1960   Date of Visit: 5/14/2018       Dear Dr. Everett Dominguez:    Thank you for referring Danny Chatman to me for evaluation. Attached you will find relevant portions of my assessment and plan of care.    If you have questions, please do not hesitate to call me. I look forward to following Danny Chatman along with you.    Sincerely,    Bryce Cid IV, MD    Enclosure  CC:  No Recipients    If you would like to receive this communication electronically, please contact [email or web address] or [phone] to request more information on [employer] Link access.    For providers and/or their staff who would like to refer a patient to [facility], please contact us through our one-stop-shop provider referral line, [facility] , at [phone].    If you feel you have received this communication in error or would no longer like to receive these types of communications, please e-mail [email or web address]

## 2018-05-16 ENCOUNTER — TELEPHONE (OUTPATIENT)
Dept: UROLOGY | Facility: CLINIC | Age: 58
End: 2018-05-16

## 2018-05-16 NOTE — TELEPHONE ENCOUNTER
----- Message from Tiera Douglass sent at 5/15/2018  5:19 PM CDT -----  Contact: Pt's Wife Radha [phone]  Pt s Wife Radha called to speak to the nurse regarding the pt's 5-15-18 appt. Mrs Garcia stated that the pt wasn't aware of the appt but was seen on 5/14/2018.    Mrs Garcia can be reached at [phone]    Thanks.    tms

## 2018-05-16 NOTE — TELEPHONE ENCOUNTER
Notified patient that he did not miss an appointment with Dr. Cid yesterday. The appointment is schedule for may 15th of 2019. Patient states understanding.

## 2019-02-21 ENCOUNTER — PATIENT MESSAGE (OUTPATIENT)
Dept: FAMILY MEDICINE | Facility: CLINIC | Age: 59
End: 2019-02-21

## 2019-02-21 ENCOUNTER — TELEPHONE (OUTPATIENT)
Dept: FAMILY MEDICINE | Facility: CLINIC | Age: 59
End: 2019-02-21

## 2019-02-21 DIAGNOSIS — R39.89 ABNORMAL PROSTATE EXAM: Primary | ICD-10-CM

## 2019-04-22 ENCOUNTER — PATIENT MESSAGE (OUTPATIENT)
Dept: FAMILY MEDICINE | Facility: CLINIC | Age: 59
End: 2019-04-22

## 2019-04-22 ENCOUNTER — PATIENT MESSAGE (OUTPATIENT)
Dept: UROLOGY | Facility: CLINIC | Age: 59
End: 2019-04-22

## 2019-07-11 ENCOUNTER — PATIENT OUTREACH (OUTPATIENT)
Dept: ADMINISTRATIVE | Facility: HOSPITAL | Age: 59
End: 2019-07-11

## 2019-07-11 ENCOUNTER — TELEPHONE (OUTPATIENT)
Dept: UROLOGY | Facility: CLINIC | Age: 59
End: 2019-07-11

## 2019-07-11 ENCOUNTER — PATIENT MESSAGE (OUTPATIENT)
Dept: ADMINISTRATIVE | Facility: HOSPITAL | Age: 59
End: 2019-07-11

## 2019-07-11 NOTE — TELEPHONE ENCOUNTER
----- Message from Dana Gama sent at 7/11/2019 11:15 AM CDT -----  Contact: Radha Chatman  States his insurance changed his primary care doctor, so he had to cancel his blood work.  Once he see's his new PCP he will be able to get the blood work done. He's going to keep his appt on 8/1. Please call Radha Chatman at [phone]. Thank clau

## 2019-07-11 NOTE — PROGRESS NOTES
Pt received message concerning not seen in 12 month.  Pt's insurance has change PCP to Dr. Crane and pt was all ready scheduled for 7/22/19.  PCP changed to Dr. Crane.

## 2019-07-22 ENCOUNTER — OFFICE VISIT (OUTPATIENT)
Dept: FAMILY MEDICINE | Facility: CLINIC | Age: 59
End: 2019-07-22
Payer: OTHER GOVERNMENT

## 2019-07-22 ENCOUNTER — LAB VISIT (OUTPATIENT)
Dept: LAB | Facility: HOSPITAL | Age: 59
End: 2019-07-22
Attending: FAMILY MEDICINE
Payer: OTHER GOVERNMENT

## 2019-07-22 VITALS
HEART RATE: 74 BPM | TEMPERATURE: 98 F | DIASTOLIC BLOOD PRESSURE: 59 MMHG | SYSTOLIC BLOOD PRESSURE: 94 MMHG | HEIGHT: 69 IN | WEIGHT: 199 LBS | BODY MASS INDEX: 29.47 KG/M2

## 2019-07-22 DIAGNOSIS — Z00.00 ROUTINE CHECK-UP: ICD-10-CM

## 2019-07-22 DIAGNOSIS — Z00.00 ROUTINE CHECK-UP: Primary | ICD-10-CM

## 2019-07-22 DIAGNOSIS — R39.89 ABNORMAL PROSTATE EXAM: ICD-10-CM

## 2019-07-22 DIAGNOSIS — L98.9 SKIN LESION: ICD-10-CM

## 2019-07-22 LAB
ALBUMIN SERPL BCP-MCNC: 3.8 G/DL (ref 3.5–5.2)
ALP SERPL-CCNC: 58 U/L (ref 55–135)
ALT SERPL W/O P-5'-P-CCNC: 23 U/L (ref 10–44)
ANION GAP SERPL CALC-SCNC: 8 MMOL/L (ref 8–16)
AST SERPL-CCNC: 24 U/L (ref 10–40)
BILIRUB SERPL-MCNC: 0.4 MG/DL (ref 0.1–1)
BUN SERPL-MCNC: 22 MG/DL (ref 8–23)
CALCIUM SERPL-MCNC: 9.4 MG/DL (ref 8.7–10.5)
CHLORIDE SERPL-SCNC: 106 MMOL/L (ref 95–110)
CHOLEST SERPL-MCNC: 202 MG/DL (ref 120–199)
CHOLEST/HDLC SERPL: 3.8 {RATIO} (ref 2–5)
CO2 SERPL-SCNC: 29 MMOL/L (ref 23–29)
COMPLEXED PSA SERPL-MCNC: 1.2 NG/ML (ref 0–4)
CREAT SERPL-MCNC: 1.4 MG/DL (ref 0.5–1.4)
EST. GFR  (AFRICAN AMERICAN): >60 ML/MIN/1.73 M^2
EST. GFR  (NON AFRICAN AMERICAN): 53.8 ML/MIN/1.73 M^2
GLUCOSE SERPL-MCNC: 90 MG/DL (ref 70–110)
HDLC SERPL-MCNC: 53 MG/DL (ref 40–75)
HDLC SERPL: 26.2 % (ref 20–50)
LDLC SERPL CALC-MCNC: 119.8 MG/DL (ref 63–159)
NONHDLC SERPL-MCNC: 149 MG/DL
POTASSIUM SERPL-SCNC: 4.1 MMOL/L (ref 3.5–5.1)
PROT SERPL-MCNC: 6.8 G/DL (ref 6–8.4)
SODIUM SERPL-SCNC: 143 MMOL/L (ref 136–145)
TRIGL SERPL-MCNC: 146 MG/DL (ref 30–150)

## 2019-07-22 PROCEDURE — 84153 ASSAY OF PSA TOTAL: CPT

## 2019-07-22 PROCEDURE — 80061 LIPID PANEL: CPT

## 2019-07-22 PROCEDURE — 80053 COMPREHEN METABOLIC PANEL: CPT

## 2019-07-22 PROCEDURE — 36415 COLL VENOUS BLD VENIPUNCTURE: CPT | Mod: PO

## 2019-07-22 PROCEDURE — 99999 PR PBB SHADOW E&M-EST. PATIENT-LVL III: CPT | Mod: PBBFAC,,, | Performed by: FAMILY MEDICINE

## 2019-07-22 PROCEDURE — 99999 PR PBB SHADOW E&M-EST. PATIENT-LVL III: ICD-10-PCS | Mod: PBBFAC,,, | Performed by: FAMILY MEDICINE

## 2019-07-22 PROCEDURE — 99396 PR PREVENTIVE VISIT,EST,40-64: ICD-10-PCS | Mod: S$PBB,,, | Performed by: FAMILY MEDICINE

## 2019-07-22 PROCEDURE — 99213 OFFICE O/P EST LOW 20 MIN: CPT | Mod: PBBFAC,PO | Performed by: FAMILY MEDICINE

## 2019-07-22 PROCEDURE — 99396 PREV VISIT EST AGE 40-64: CPT | Mod: S$PBB,,, | Performed by: FAMILY MEDICINE

## 2019-07-22 NOTE — PROGRESS NOTES
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       No current facility-administered medications on file prior to visit.      Social History     Socioeconomic History    Marital status:      Spouse name: Not on file    Number of children: Not on file    Years of education: Not on file    Highest education level: Not on file   Occupational History    Not on file   Social Needs    Financial resource strain: Not on file    Food insecurity:     Worry: Not on file     Inability: Not on file    Transportation needs:     Medical: Not on file     Non-medical: Not on file   Tobacco Use    Smoking status: Never Smoker    Smokeless tobacco: Never Used   Substance and Sexual Activity    Alcohol use: Yes     Alcohol/week: 0.0 oz     Comment: rare    Drug use: No    Sexual activity: Not on file   Lifestyle    Physical activity:     Days per week: Not on file     Minutes per session: Not on file    Stress: Not on file   Relationships    Social connections:     Talks on phone: Not on file     Gets together: Not on file     Attends Sabianism service: Not on file     Active member of club or organization: Not on file     Attends meetings of clubs or organizations: Not on file     Relationship status: Not on file   Other Topics Concern     Not on file   Social History Narrative    Not on file     Family History   Problem Relation Age of Onset    Skin cancer Mother     Cerebral aneurysm Mother     No Known Problems Maternal Grandmother     No Known Problems Maternal Grandfather     No Known Problems Paternal Grandmother     No Known Problems Paternal Grandfather     Amblyopia Neg Hx     Blindness Neg Hx     Cancer Neg Hx     Cataracts Neg Hx     Glaucoma Neg Hx     Diabetes Neg Hx     Hypertension Neg Hx     Macular degeneration Neg Hx     Retinal detachment Neg Hx     Strabismus Neg Hx     Stroke Neg Hx     Thyroid disease Neg Hx          ROS:GENERAL: No fever, chills, fatigability or weight loss.  SKIN: No rashes, itching or changes in color or texture of skin.  HEAD: No headaches or recent head trauma.EYES: Visual acuity fine. No photophobia, ocular pain or diplopia.EARS: Denies ear pain, discharge or vertigo.NOSE: No loss of smell, no epistaxis or postnasal drip.MOUTH & THROAT: No hoarseness or change in voice. No excessive gum bleeding.NODES: Denies swollen glands.  CHEST: Denies DONALDSON, cyanosis, wheezing, cough and sputum production.  CARDIOVASCULAR: Denies chest pain, PND, orthopnea or reduced exercise tolerance.  ABDOMEN: Appetite fine. No weight loss. Denies diarrhea, abdominal pain, hematemesis or blood in stool.  URINARY: No flank pain, dysuria or hematuria.  PERIPHERAL VASCULAR: No claudication or cyanosis.  MUSCULOSKELETAL: See above.  NEUROLOGIC: No history of seizures, paralysis, alteration of gait or coordination.  PE:    HEAD: Normocephalic, atraumatic.EYES: PERRL. EOMI.   EARS: TM's intact. Light reflex normal. No retraction or perforation.   NOSE: Mucosa pink. Airway clear.MOUTH & THROAT: No tonsillar enlargement. No pharyngeal erythema or exudate. No stridor.  NODES: No cervical, axillary or inguinal lymph node enlargement.  CHEST: Lungs clear to auscultation.  CARDIOVASCULAR: Normal S1, S2. No rubs, murmurs or  gallops.  ABDOMEN: Bowel sounds normal. Not distended. Soft. No tenderness or masses.  MUSCULOSKELETAL: No palpable abnormality  NEUROLOGIC: Cranial Nerves: II-XII grossly intact.  Motor: 5/5 strength major flexors/extensors.  DTR's: Knees, Ankles 2+ and equal bilaterally; downgoing toes.  Sensory: Intact to light touch distally.  Gait & Posture: Normal gait and fine motion. No cerebellar signs.     Impression:Routine health check  Plan:Lab eval  Rec diet and ex recs  Rev age appropriate screenings    There are no preventive care reminders to display for this patient.

## 2019-07-23 DIAGNOSIS — R79.89 ELEVATED SERUM CREATININE: Primary | ICD-10-CM

## 2019-07-31 ENCOUNTER — PATIENT MESSAGE (OUTPATIENT)
Dept: FAMILY MEDICINE | Facility: CLINIC | Age: 59
End: 2019-07-31

## 2019-07-31 ENCOUNTER — PATIENT MESSAGE (OUTPATIENT)
Dept: UROLOGY | Facility: CLINIC | Age: 59
End: 2019-07-31

## 2019-08-01 ENCOUNTER — LAB VISIT (OUTPATIENT)
Dept: LAB | Facility: HOSPITAL | Age: 59
End: 2019-08-01
Attending: FAMILY MEDICINE
Payer: OTHER GOVERNMENT

## 2019-08-01 ENCOUNTER — OFFICE VISIT (OUTPATIENT)
Dept: UROLOGY | Facility: CLINIC | Age: 59
End: 2019-08-01
Payer: OTHER GOVERNMENT

## 2019-08-01 VITALS
WEIGHT: 198.44 LBS | HEIGHT: 69 IN | HEART RATE: 97 BPM | BODY MASS INDEX: 29.39 KG/M2 | SYSTOLIC BLOOD PRESSURE: 118 MMHG | DIASTOLIC BLOOD PRESSURE: 74 MMHG

## 2019-08-01 DIAGNOSIS — Z12.5 PROSTATE CANCER SCREENING: Primary | ICD-10-CM

## 2019-08-01 DIAGNOSIS — R79.89 ELEVATED SERUM CREATININE: ICD-10-CM

## 2019-08-01 LAB
ANION GAP SERPL CALC-SCNC: 10 MMOL/L (ref 8–16)
BILIRUB SERPL-MCNC: NORMAL MG/DL
BLOOD URINE, POC: NORMAL
BUN SERPL-MCNC: 19 MG/DL (ref 8–23)
CALCIUM SERPL-MCNC: 9.2 MG/DL (ref 8.7–10.5)
CHLORIDE SERPL-SCNC: 107 MMOL/L (ref 95–110)
CO2 SERPL-SCNC: 23 MMOL/L (ref 23–29)
COLOR, POC UA: YELLOW
CREAT SERPL-MCNC: 1.1 MG/DL (ref 0.5–1.4)
EST. GFR  (AFRICAN AMERICAN): >60 ML/MIN/1.73 M^2
EST. GFR  (NON AFRICAN AMERICAN): >60 ML/MIN/1.73 M^2
GLUCOSE SERPL-MCNC: 95 MG/DL (ref 70–110)
GLUCOSE UR QL STRIP: NORMAL
KETONES UR QL STRIP: NORMAL
LEUKOCYTE ESTERASE URINE, POC: NORMAL
NITRITE, POC UA: NORMAL
PH, POC UA: 5
POTASSIUM SERPL-SCNC: 3.9 MMOL/L (ref 3.5–5.1)
PROTEIN, POC: NORMAL
SODIUM SERPL-SCNC: 140 MMOL/L (ref 136–145)
SPECIFIC GRAVITY, POC UA: 1.02
UROBILINOGEN, POC UA: NORMAL

## 2019-08-01 PROCEDURE — 36415 COLL VENOUS BLD VENIPUNCTURE: CPT | Mod: PO

## 2019-08-01 PROCEDURE — 99213 PR OFFICE/OUTPT VISIT, EST, LEVL III, 20-29 MIN: ICD-10-PCS | Mod: S$PBB,,, | Performed by: UROLOGY

## 2019-08-01 PROCEDURE — 99999 PR PBB SHADOW E&M-EST. PATIENT-LVL III: ICD-10-PCS | Mod: PBBFAC,,, | Performed by: UROLOGY

## 2019-08-01 PROCEDURE — 80048 BASIC METABOLIC PNL TOTAL CA: CPT

## 2019-08-01 PROCEDURE — 99213 OFFICE O/P EST LOW 20 MIN: CPT | Mod: PBBFAC | Performed by: UROLOGY

## 2019-08-01 PROCEDURE — 99213 OFFICE O/P EST LOW 20 MIN: CPT | Mod: S$PBB,,, | Performed by: UROLOGY

## 2019-08-01 PROCEDURE — 99999 PR PBB SHADOW E&M-EST. PATIENT-LVL III: CPT | Mod: PBBFAC,,, | Performed by: UROLOGY

## 2019-08-01 PROCEDURE — 81002 URINALYSIS NONAUTO W/O SCOPE: CPT | Mod: PBBFAC | Performed by: UROLOGY

## 2019-08-01 NOTE — LETTER
August 1, 2019      Everett Dominguez MD  [address]  [place] [address]           [facility]  [address] [place] [address]  Phone: [phone]  Fax: [phone]          Patient: Danny Chatman   MR Number: [id number]   YOB: 1960   Date of Visit: 8/1/2019       Dear Dr. Everett Dominguez:    Thank you for referring Danny Chatman to me for evaluation. Attached you will find relevant portions of my assessment and plan of care.    If you have questions, please do not hesitate to call me. I look forward to following Danny Chatman along with you.    Sincerely,    Bryce Cid IV, MD    Enclosure  CC:  No Recipients    If you would like to receive this communication electronically, please contact [email or web address] or [phone] to request more information on [employer] Link access.    For providers and/or their staff who would like to refer a patient to [facility], please contact us through our one-stop-shop provider referral line, [facility] , at [phone].    If you feel you have received this communication in error or would no longer like to receive these types of communications, please e-mail [email or web address]

## 2019-08-01 NOTE — PROGRESS NOTES
Chief Complaint: Prostate Cancer Screening    HPI:   8/1/19: PSA same, no adverse changes except sometimes feels something in the cord where the vasectomy was done.  5/14/18: PSA unchanged, no new problems.  Reviewed history in detail.  Brother has prostate cancer, 5 yrs older; discussed.  11/14/17: Laws: 57 y/o M patient of Dr. Cid here for 6 month f/u of abnormal prostate exam. Reviewed PSA trend; normal numbers (1.2, 1.0). Has slow stream at times. Nocturia 1-2x. No abd/pelvic pain and no exac/rel factors.  No hematuria.  No urolithiasis.    5/5/17: 55 yo man referred by Dr. Dominguez after abnormal YVONNE.  No abd/pelvic pain and no exac/rel factors.  No hematuria.  No urolithiasis.  No urinary bother.  No  history.  Normal sexual function.    Allergies:  Patient has no known allergies.    Medications:  has a current medication list which includes the following prescription(s): multivit-min/fa/lycopen/lutein.    Review of Systems:  General: No fever, chills, fatigability, or weight loss.  Skin: No rashes, itching, or changes in color or texture of skin.  Chest: Denies DONALDSON, cyanosis, wheezing, cough, and sputum production.  Abdomen: Appetite fine. No weight loss. Denies diarrhea, abdominal pain, hematemesis, or blood in stool.  Musculoskeletal: No joint stiffness or swelling. Denies back pain.  : As above.  All other review of systems negative.    PMH:   has a past medical history of Cataract, Choroidal nevus of left eye (1/25/2018), Colon polyp, and Hyperlipidemia.    PSH:   has a past surgical history that includes Anterior cruciate ligament repair (Right, 1999); Finger fracture surgery; Colonoscopy (N/A, 12/6/2016); and Vasectomy (1989).    FamHx: family history includes Cerebral aneurysm in his mother; No Known Problems in his maternal grandfather, maternal grandmother, paternal grandfather, and paternal grandmother; Skin cancer in his mother.    SocHx:  reports that he has never smoked. He has never used  smokeless tobacco. He reports that he drinks alcohol. He reports that he does not use drugs.      Physical Exam:  Vitals:    08/01/19 1305   BP: 118/74   Pulse: 97     General: A&Ox3, no apparent distress, no deformities  Neck: No masses, normal thyroid  Lungs: normal inspiration, no use of accessory muscles  Heart: normal pulse, no arrhythmias  Abdomen: Soft, NT, ND  Skin: The skin is warm and dry. No jaundice.  Ext: No c/c/e.  :   5/14/18: Test desc debra, no abnormalities of epididymus. Penis normal, with normal penile and scrotal skin. Meatus normal. Normal rectal tone, no hemorrhoids. Prost 30 gm no nodules or masses appreciated. Does have a more firm feature to the right side and kind of a wing to it. SV not palpable. Perineum and anus normal.    Labs/Studies: Urinalysis performed in clinic, summary: UA normal  PSA    11/16: 1.2    5/17: 1.2    11/17: 1.0    5/18: 1.2    7/19: 1.2    Impression/Plan:   1. PSA stable, again.  PSA/RTC/YVONNE 12 mo.

## 2020-07-01 ENCOUNTER — PATIENT MESSAGE (OUTPATIENT)
Dept: FAMILY MEDICINE | Facility: CLINIC | Age: 60
End: 2020-07-01

## 2020-07-19 ENCOUNTER — PATIENT MESSAGE (OUTPATIENT)
Dept: FAMILY MEDICINE | Facility: CLINIC | Age: 60
End: 2020-07-19

## 2020-07-20 ENCOUNTER — DOCUMENTATION ONLY (OUTPATIENT)
Dept: FAMILY MEDICINE | Facility: CLINIC | Age: 60
End: 2020-07-20

## 2020-07-20 NOTE — PROGRESS NOTES
Pre-Visit Chart Review  For Appointment Scheduled on 7/21/2020    Health Maintenance Due   Topic Date Due    HIV Screening  06/05/1973    Shingles Vaccine (1 of 2) 06/05/2008

## 2020-07-21 ENCOUNTER — OFFICE VISIT (OUTPATIENT)
Dept: FAMILY MEDICINE | Facility: CLINIC | Age: 60
End: 2020-07-21
Payer: OTHER GOVERNMENT

## 2020-07-21 ENCOUNTER — LAB VISIT (OUTPATIENT)
Dept: LAB | Facility: HOSPITAL | Age: 60
End: 2020-07-21
Attending: UROLOGY
Payer: OTHER GOVERNMENT

## 2020-07-21 VITALS
HEART RATE: 61 BPM | WEIGHT: 203.94 LBS | SYSTOLIC BLOOD PRESSURE: 106 MMHG | TEMPERATURE: 98 F | RESPIRATION RATE: 16 BRPM | HEIGHT: 69 IN | DIASTOLIC BLOOD PRESSURE: 74 MMHG | OXYGEN SATURATION: 93 % | BODY MASS INDEX: 30.21 KG/M2

## 2020-07-21 DIAGNOSIS — L91.8 SKIN TAGS, MULTIPLE ACQUIRED: ICD-10-CM

## 2020-07-21 DIAGNOSIS — Z12.5 PROSTATE CANCER SCREENING: ICD-10-CM

## 2020-07-21 DIAGNOSIS — Z00.00 HEALTHCARE MAINTENANCE: Primary | ICD-10-CM

## 2020-07-21 PROCEDURE — 99215 OFFICE O/P EST HI 40 MIN: CPT | Mod: PBBFAC,PO | Performed by: FAMILY MEDICINE

## 2020-07-21 PROCEDURE — 99396 PREV VISIT EST AGE 40-64: CPT | Mod: S$PBB,,, | Performed by: FAMILY MEDICINE

## 2020-07-21 PROCEDURE — 99999 PR PBB SHADOW E&M-EST. PATIENT-LVL V: ICD-10-PCS | Mod: PBBFAC,,, | Performed by: FAMILY MEDICINE

## 2020-07-21 PROCEDURE — 99999 PR PBB SHADOW E&M-EST. PATIENT-LVL V: CPT | Mod: PBBFAC,,, | Performed by: FAMILY MEDICINE

## 2020-07-21 PROCEDURE — 36415 COLL VENOUS BLD VENIPUNCTURE: CPT | Mod: PO

## 2020-07-21 PROCEDURE — 84153 ASSAY OF PSA TOTAL: CPT

## 2020-07-21 PROCEDURE — 99396 PR PREVENTIVE VISIT,EST,40-64: ICD-10-PCS | Mod: S$PBB,,, | Performed by: FAMILY MEDICINE

## 2020-07-22 LAB — COMPLEXED PSA SERPL-MCNC: 1.3 NG/ML (ref 0–4)

## 2020-07-23 NOTE — PROGRESS NOTES
Subjective:   Patient ID: Danny Chatman is a 60 y.o. male     Chief Complaint:Establish Care      Patient for checkup.  Patient doing well.    Review of Systems   Constitutional: Negative for activity change and unexpected weight change.   HENT: Positive for hearing loss. Negative for rhinorrhea and trouble swallowing.    Eyes: Negative for discharge and visual disturbance.   Respiratory: Negative for chest tightness and wheezing.    Cardiovascular: Negative for chest pain and palpitations.   Gastrointestinal: Negative for blood in stool, constipation, diarrhea and vomiting.   Endocrine: Negative for polydipsia and polyuria.   Genitourinary: Negative for difficulty urinating, hematuria and urgency.   Musculoskeletal: Negative for arthralgias, joint swelling and neck pain.   Neurological: Negative for weakness and headaches.   Psychiatric/Behavioral: Negative for confusion and dysphoric mood.     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  Physical Exam  Vitals signs and nursing note reviewed.   Constitutional:       Appearance: He is well-developed.   HENT:      Head: Normocephalic and atraumatic.   Eyes:      General: No scleral icterus.     Conjunctiva/sclera: Conjunctivae normal.   Neck:      Musculoskeletal: Normal range of motion and neck supple.   Pulmonary:      Effort: Pulmonary effort is normal. No respiratory distress.   Musculoskeletal: Normal range of motion.          General: No deformity.   Skin:     Coloration: Skin is not pale.      Findings: No rash.   Neurological:      Mental Status: He is alert and oriented to person, place, and time.   Psychiatric:         Behavior: Behavior normal.         Thought Content: Thought content normal.         Judgment: Judgment normal.       Assessment:     1. Healthcare maintenance    2. Prostate cancer screening    3. Skin tags, multiple acquired      Plan:   Healthcare maintenance  -     Comprehensive metabolic panel; Future; Expected date: 10/21/2020  -     Lipid Panel; Future; Expected date: 07/21/2020  -     CBC auto differential; Future; Expected date: 07/21/2020    Prostate cancer screening  -     Ambulatory referral/consult to Urology; Future; Expected date: 07/28/2020    Skin tags, multiple acquired  -     Ambulatory referral/consult to Dermatology; Future; Expected date: 07/28/2020          Marvin Peñaloza MD  07/23/2020    Portions of this note have been dictated with MARIBEL Ribeiro

## 2020-08-03 ENCOUNTER — OFFICE VISIT (OUTPATIENT)
Dept: UROLOGY | Facility: CLINIC | Age: 60
End: 2020-08-03
Payer: OTHER GOVERNMENT

## 2020-08-03 ENCOUNTER — OFFICE VISIT (OUTPATIENT)
Dept: DERMATOLOGY | Facility: CLINIC | Age: 60
End: 2020-08-03
Payer: OTHER GOVERNMENT

## 2020-08-03 ENCOUNTER — PROCEDURE VISIT (OUTPATIENT)
Dept: DERMATOLOGY | Facility: CLINIC | Age: 60
End: 2020-08-03
Payer: OTHER GOVERNMENT

## 2020-08-03 VITALS — HEIGHT: 69 IN | WEIGHT: 203 LBS | BODY MASS INDEX: 30.07 KG/M2

## 2020-08-03 VITALS
BODY MASS INDEX: 29.66 KG/M2 | WEIGHT: 200.81 LBS | TEMPERATURE: 98 F | DIASTOLIC BLOOD PRESSURE: 82 MMHG | SYSTOLIC BLOOD PRESSURE: 138 MMHG

## 2020-08-03 DIAGNOSIS — D22.9 BENIGN MOLE: ICD-10-CM

## 2020-08-03 DIAGNOSIS — Z12.5 PROSTATE CANCER SCREENING: ICD-10-CM

## 2020-08-03 DIAGNOSIS — L91.8 SKIN TAG: Primary | ICD-10-CM

## 2020-08-03 DIAGNOSIS — I10 HYPERTENSION, UNSPECIFIED TYPE: ICD-10-CM

## 2020-08-03 DIAGNOSIS — N43.40 SPERMATOCELE: Primary | ICD-10-CM

## 2020-08-03 DIAGNOSIS — L91.8 SKIN TAGS, MULTIPLE ACQUIRED: ICD-10-CM

## 2020-08-03 DIAGNOSIS — L81.4 LENTIGO: ICD-10-CM

## 2020-08-03 PROCEDURE — 99999 PR PBB SHADOW E&M-EST. PATIENT-LVL III: ICD-10-PCS | Mod: PBBFAC,,, | Performed by: DERMATOLOGY

## 2020-08-03 PROCEDURE — 99202 PR OFFICE/OUTPT VISIT, NEW, LEVL II, 15-29 MIN: ICD-10-PCS | Mod: S$PBB,,, | Performed by: DERMATOLOGY

## 2020-08-03 PROCEDURE — 99999 PR PBB SHADOW E&M-EST. PATIENT-LVL III: ICD-10-PCS | Mod: PBBFAC,,, | Performed by: UROLOGY

## 2020-08-03 PROCEDURE — 99499 UNLISTED E&M SERVICE: CPT | Mod: ,,, | Performed by: DERMATOLOGY

## 2020-08-03 PROCEDURE — 99214 PR OFFICE/OUTPT VISIT, EST, LEVL IV, 30-39 MIN: ICD-10-PCS | Mod: S$PBB,,, | Performed by: UROLOGY

## 2020-08-03 PROCEDURE — 99202 OFFICE O/P NEW SF 15 MIN: CPT | Mod: S$PBB,,, | Performed by: DERMATOLOGY

## 2020-08-03 PROCEDURE — 99214 OFFICE O/P EST MOD 30 MIN: CPT | Mod: S$PBB,,, | Performed by: UROLOGY

## 2020-08-03 PROCEDURE — 99999 PR PBB SHADOW E&M-EST. PATIENT-LVL III: CPT | Mod: PBBFAC,,, | Performed by: DERMATOLOGY

## 2020-08-03 PROCEDURE — 11200 RMVL SKIN TAGS UP TO&INC 15: CPT | Mod: CSM,S$GLB,, | Performed by: DERMATOLOGY

## 2020-08-03 PROCEDURE — 99213 OFFICE O/P EST LOW 20 MIN: CPT | Mod: PBBFAC,27 | Performed by: UROLOGY

## 2020-08-03 PROCEDURE — 99499 NO LOS: ICD-10-PCS | Mod: ,,, | Performed by: DERMATOLOGY

## 2020-08-03 PROCEDURE — 11200 PR REMOVAL OF SKIN TAGS, UP TO 15: ICD-10-PCS | Mod: CSM,S$GLB,, | Performed by: DERMATOLOGY

## 2020-08-03 PROCEDURE — 99213 OFFICE O/P EST LOW 20 MIN: CPT | Mod: PBBFAC,PO | Performed by: DERMATOLOGY

## 2020-08-03 PROCEDURE — 99999 PR PBB SHADOW E&M-EST. PATIENT-LVL III: CPT | Mod: PBBFAC,,, | Performed by: UROLOGY

## 2020-08-03 NOTE — LETTER
August 3, 2020      Marvin Peñaloza MD  [address] [facility] [address]           [facility]  [address] [place] [address]  Phone: [phone]  Fax: [phone]          Patient: Danny Chatman   MR Number: [id number]   YOB: 1960   Date of Visit: 8/3/2020       Dear Dr. Marvin Peñaloza:    Thank you for referring Danny Cahtman to me for evaluation. Attached you will find relevant portions of my assessment and plan of care.    If you have questions, please do not hesitate to call me. I look forward to following Danny Chatman along with you.    Sincerely,    Bryce Cid IV, MD    Enclosure  CC:  No Recipients    If you would like to receive this communication electronically, please contact [email or web address] or [phone] to request more information on [email or web address] Link access.    For providers and/or their staff who would like to refer a patient to [facility], please contact us through our one-stop-shop provider referral line, [facility], at [phone].    If you feel you have received this communication in error or would no longer like to receive these types of communications, please e-mail [email or web address]

## 2020-08-03 NOTE — PROGRESS NOTES
Chief Complaint: Prostate Cancer Screening    HPI:   8/3/20: PSA reassuring.  Discussed his cord item again; feels it grew, worried.  Reviewed history in detail.   8/1/19: PSA same, no adverse changes except sometimes feels something in the cord where the vasectomy was done.  5/14/18: PSA unchanged, no new problems.  Reviewed history in detail.  Brother has prostate cancer, 5 yrs older; discussed.  11/14/17: Laws: 57 y/o M patient of Dr. Cid here for 6 month f/u of abnormal prostate exam. Reviewed PSA trend; normal numbers (1.2, 1.0). Has slow stream at times. Nocturia 1-2x. No abd/pelvic pain and no exac/rel factors.  No hematuria.  No urolithiasis.    5/5/17: 55 yo man referred by Dr. Dominguez after abnormal YVONNE.  No abd/pelvic pain and no exac/rel factors.  No hematuria.  No urolithiasis.  No urinary bother.  No  history.  Normal sexual function.    Allergies:  Patient has no known allergies.    Medications:  has a current medication list which includes the following prescription(s): multivit-min/fa/lycopen/lutein.    Review of Systems:  General: No fever, chills, fatigability, or weight loss.  Skin: No rashes, itching, or changes in color or texture of skin.  Chest: Denies DONALDSON, cyanosis, wheezing, cough, and sputum production.  Abdomen: Appetite fine. No weight loss. Denies diarrhea, abdominal pain, hematemesis, or blood in stool.  Musculoskeletal: No joint stiffness or swelling. Denies back pain.  : As above.  All other review of systems negative.    PMH:   has a past medical history of Cataract, Choroidal nevus of left eye (1/25/2018), Colon polyp, and Hyperlipidemia.    PSH:   has a past surgical history that includes Anterior cruciate ligament repair (Right, 1999); Finger fracture surgery; Colonoscopy (N/A, 12/6/2016); and Vasectomy (1989).    FamHx: family history includes Cerebral aneurysm in his mother; No Known Problems in his maternal grandfather, maternal grandmother, paternal grandfather, and  paternal grandmother; Skin cancer in his mother.    SocHx:  reports that he has never smoked. He has never used smokeless tobacco. He reports current alcohol use. He reports that he does not use drugs.      Physical Exam:  Vitals:    08/03/20 1307   BP: 138/82   Temp: 98.1 °F (36.7 °C)     General: A&Ox3, no apparent distress, no deformities  Neck: No masses, normal thyroid  Lungs: normal inspiration, no use of accessory muscles  Heart: normal pulse, no arrhythmias  Abdomen: Soft, NT, ND  Skin: The skin is warm and dry. No jaundice.  Ext: No c/c/e.  :   8/3/20: Test desc debra, no abnormalities of epididymus on right, 2 cm spermatocele palpable. Penis normal, with normal penile and scrotal skin. Meatus normal. Normal rectal tone, no hemorrhoids. Prost 30 gm no nodules or masses appreciated. Does have a more firm feature to the right side and kind of a wing to it. SV not palpable. Perineum and anus normal.    Labs/Studies: Urinalysis performed in clinic, summary: UA normal  PSA    11/16: 1.2    5/17: 1.2    11/17: 1.0    5/18: 1.2    7/19: 1.2    7/20: 1.3    Impression/Plan:   1. PSA stable, again.  PSA/RTC/YVONNE 12 mo.   2. Scrotal findings benign; discussed  3. HTN: discussed mild elev

## 2020-08-03 NOTE — PROGRESS NOTES
Cosmetic treatment today performed for destruction of 3 tags of the l neck and r arm pit.  Risks of scar and recurrence reviewed.  $40.

## 2020-08-03 NOTE — PROGRESS NOTES
Subjective:       Patient ID:  Danny Chatman is a 60 y.o. male who presents for   Chief Complaint   Patient presents with    Skin Tags     New pt  Here today for c/o skin tags on neck and under arm, x yr, no tx  Gets irritated when shaving      Review of Systems   Constitutional: Negative.    HENT: Negative for sore throat.    Respiratory: Negative for cough.    Musculoskeletal: Negative.    Skin: Positive for activity-related sunscreen use and wears hat.        Objective:    Physical Exam   Constitutional: He appears well-developed and well-nourished.   Eyes: No conjunctival no injection.   Neurological: He is alert and oriented to person, place, and time.   Psychiatric: He has a normal mood and affect.   Skin:   Areas Examined (abnormalities noted in diagram):   Chest / Axilla Inspection Performed  Abdomen Inspection Performed  Back Inspection Performed  RUE Inspected  LUE Inspection Performed              Diagram Legend     Erythematous scaling macule/papule c/w actinic keratosis       Vascular papule c/w angioma      Pigmented verrucoid papule/plaque c/w seborrheic keratosis      Yellow umbilicated papule c/w sebaceous hyperplasia      Irregularly shaped tan macule c/w lentigo     1-2 mm smooth white papules consistent with Milia      Movable subcutaneous cyst with punctum c/w epidermal inclusion cyst      Subcutaneous movable cyst c/w pilar cyst      Firm pink to brown papule c/w dermatofibroma      Pedunculated fleshy papule(s) c/w skin tag(s)      Evenly pigmented macule c/w junctional nevus     Mildly variegated pigmented, slightly irregular-bordered macule c/w mildly atypical nevus      Flesh colored to evenly pigmented papule c/w intradermal nevus       Pink pearly papule/plaque c/w basal cell carcinoma      Erythematous hyperkeratotic cursted plaque c/w SCC      Surgical scar with no sign of skin cancer recurrence      Open and closed comedones      Inflammatory papules and pustules      Verrucoid  papule consistent consistent with wart     Erythematous eczematous patches and plaques     Dystrophic onycholytic nail with subungual debris c/w onychomycosis     Umbilicated papule    Erythematous-base heme-crusted tan verrucoid plaque consistent with inflamed seborrheic keratosis     Erythematous Silvery Scaling Plaque c/w Psoriasis     See annotation      Assessment / Plan:        Skin tag  Discussed with patient the benign nature of these lesions and that no treatment is indicated.    Patient can also consider folk treatment of string strangulation at home.    Prn cosmetic hyfrecation of tags on the l neck and r pit.  Costs $40.  Scar and recurrence reviewed.    Skin tags, multiple acquired  -     Ambulatory referral/consult to Dermatology    Benign mole  Discussed with patient the benign nature of these lesions and that no treatment is indicated.      Lentigo  Discussed with patient the benign nature of these lesions and that no treatment is indicated.               Follow up if symptoms worsen or fail to improve.

## 2020-08-03 NOTE — LETTER
August 3, 2020      Marvin Peñaloza MD  [address] [facility] [address]           [address] [address]  Phone: [phone]          Patient: Danny Chatman   MR Number: [id number]   YOB: 1960   Date of Visit: 8/3/2020       Dear Dr. Marvin Peñaloza:    Thank you for referring Danny Chatman to me for evaluation. Attached you will find relevant portions of my assessment and plan of care.    If you have questions, please do not hesitate to call me. I look forward to following Danny Chatman along with you.    Sincerely,    Sunny Fuchs MD    Enclosure  CC:  No Recipients    If you would like to receive this communication electronically, please contact [email or web address] or [phone] to request more information on [employer] Link access.    For providers and/or their staff who would like to refer a patient to [facility], please contact us through our one-stop-shop provider referral line, [facility] , at [phone].    If you feel you have received this communication in error or would no longer like to receive these types of communications, please e-mail [email or web address]

## 2021-05-05 ENCOUNTER — TELEPHONE (OUTPATIENT)
Dept: UROLOGY | Facility: CLINIC | Age: 61
End: 2021-05-05

## 2021-05-05 ENCOUNTER — PATIENT MESSAGE (OUTPATIENT)
Dept: UROLOGY | Facility: CLINIC | Age: 61
End: 2021-05-05

## 2021-05-25 ENCOUNTER — PATIENT MESSAGE (OUTPATIENT)
Dept: FAMILY MEDICINE | Facility: CLINIC | Age: 61
End: 2021-05-25

## 2021-05-25 ENCOUNTER — PATIENT MESSAGE (OUTPATIENT)
Dept: UROLOGY | Facility: CLINIC | Age: 61
End: 2021-05-25

## 2021-07-30 ENCOUNTER — OFFICE VISIT (OUTPATIENT)
Dept: FAMILY MEDICINE | Facility: CLINIC | Age: 61
End: 2021-07-30
Payer: OTHER GOVERNMENT

## 2021-07-30 ENCOUNTER — LAB VISIT (OUTPATIENT)
Dept: LAB | Facility: HOSPITAL | Age: 61
End: 2021-07-30
Attending: FAMILY MEDICINE
Payer: OTHER GOVERNMENT

## 2021-07-30 VITALS
SYSTOLIC BLOOD PRESSURE: 104 MMHG | HEIGHT: 69 IN | WEIGHT: 203.69 LBS | BODY MASS INDEX: 30.17 KG/M2 | TEMPERATURE: 98 F | OXYGEN SATURATION: 95 % | DIASTOLIC BLOOD PRESSURE: 70 MMHG | HEART RATE: 68 BPM

## 2021-07-30 DIAGNOSIS — N50.89 TESTICLE SWELLING: ICD-10-CM

## 2021-07-30 DIAGNOSIS — Z00.00 HEALTHCARE MAINTENANCE: ICD-10-CM

## 2021-07-30 DIAGNOSIS — Z00.00 HEALTHCARE MAINTENANCE: Primary | ICD-10-CM

## 2021-07-30 LAB
ALBUMIN SERPL BCP-MCNC: 3.9 G/DL (ref 3.5–5.2)
ALP SERPL-CCNC: 60 U/L (ref 55–135)
ALT SERPL W/O P-5'-P-CCNC: 22 U/L (ref 10–44)
ANION GAP SERPL CALC-SCNC: 10 MMOL/L (ref 8–16)
AST SERPL-CCNC: 18 U/L (ref 10–40)
BASOPHILS # BLD AUTO: 0.04 K/UL (ref 0–0.2)
BASOPHILS NFR BLD: 0.7 % (ref 0–1.9)
BILIRUB SERPL-MCNC: 0.6 MG/DL (ref 0.1–1)
BUN SERPL-MCNC: 24 MG/DL (ref 8–23)
CALCIUM SERPL-MCNC: 9.7 MG/DL (ref 8.7–10.5)
CHLORIDE SERPL-SCNC: 108 MMOL/L (ref 95–110)
CHOLEST SERPL-MCNC: 224 MG/DL (ref 120–199)
CHOLEST/HDLC SERPL: 4.4 {RATIO} (ref 2–5)
CO2 SERPL-SCNC: 24 MMOL/L (ref 23–29)
COMPLEXED PSA SERPL-MCNC: 1.7 NG/ML (ref 0–4)
CREAT SERPL-MCNC: 1.2 MG/DL (ref 0.5–1.4)
DIFFERENTIAL METHOD: NORMAL
EOSINOPHIL # BLD AUTO: 0.2 K/UL (ref 0–0.5)
EOSINOPHIL NFR BLD: 2.5 % (ref 0–8)
ERYTHROCYTE [DISTWIDTH] IN BLOOD BY AUTOMATED COUNT: 12.3 % (ref 11.5–14.5)
EST. GFR  (AFRICAN AMERICAN): >60 ML/MIN/1.73 M^2
EST. GFR  (NON AFRICAN AMERICAN): >60 ML/MIN/1.73 M^2
ESTIMATED AVG GLUCOSE: 114 MG/DL (ref 68–131)
GLUCOSE SERPL-MCNC: 92 MG/DL (ref 70–110)
HBA1C MFR BLD: 5.6 % (ref 4–5.6)
HCT VFR BLD AUTO: 44.1 % (ref 40–54)
HDLC SERPL-MCNC: 51 MG/DL (ref 40–75)
HDLC SERPL: 22.8 % (ref 20–50)
HGB BLD-MCNC: 14.8 G/DL (ref 14–18)
HIV 1+2 AB+HIV1 P24 AG SERPL QL IA: NEGATIVE
IMM GRANULOCYTES # BLD AUTO: 0.01 K/UL (ref 0–0.04)
IMM GRANULOCYTES NFR BLD AUTO: 0.2 % (ref 0–0.5)
LDLC SERPL CALC-MCNC: 138 MG/DL (ref 63–159)
LYMPHOCYTES # BLD AUTO: 2.4 K/UL (ref 1–4.8)
LYMPHOCYTES NFR BLD: 38.9 % (ref 18–48)
MCH RBC QN AUTO: 30.3 PG (ref 27–31)
MCHC RBC AUTO-ENTMCNC: 33.6 G/DL (ref 32–36)
MCV RBC AUTO: 90 FL (ref 82–98)
MONOCYTES # BLD AUTO: 0.5 K/UL (ref 0.3–1)
MONOCYTES NFR BLD: 8.1 % (ref 4–15)
NEUTROPHILS # BLD AUTO: 3 K/UL (ref 1.8–7.7)
NEUTROPHILS NFR BLD: 49.6 % (ref 38–73)
NONHDLC SERPL-MCNC: 173 MG/DL
NRBC BLD-RTO: 0 /100 WBC
PLATELET # BLD AUTO: 258 K/UL (ref 150–450)
PMV BLD AUTO: 10.2 FL (ref 9.2–12.9)
POTASSIUM SERPL-SCNC: 4.6 MMOL/L (ref 3.5–5.1)
PROT SERPL-MCNC: 7.1 G/DL (ref 6–8.4)
RBC # BLD AUTO: 4.89 M/UL (ref 4.6–6.2)
SODIUM SERPL-SCNC: 142 MMOL/L (ref 136–145)
TRIGL SERPL-MCNC: 175 MG/DL (ref 30–150)
WBC # BLD AUTO: 6.07 K/UL (ref 3.9–12.7)

## 2021-07-30 PROCEDURE — 99396 PREV VISIT EST AGE 40-64: CPT | Mod: S$PBB,,, | Performed by: FAMILY MEDICINE

## 2021-07-30 PROCEDURE — 83036 HEMOGLOBIN GLYCOSYLATED A1C: CPT | Performed by: FAMILY MEDICINE

## 2021-07-30 PROCEDURE — 99396 PR PREVENTIVE VISIT,EST,40-64: ICD-10-PCS | Mod: S$PBB,,, | Performed by: FAMILY MEDICINE

## 2021-07-30 PROCEDURE — 99214 OFFICE O/P EST MOD 30 MIN: CPT | Mod: PBBFAC,PO | Performed by: FAMILY MEDICINE

## 2021-07-30 PROCEDURE — 80061 LIPID PANEL: CPT | Performed by: FAMILY MEDICINE

## 2021-07-30 PROCEDURE — 80053 COMPREHEN METABOLIC PANEL: CPT | Performed by: FAMILY MEDICINE

## 2021-07-30 PROCEDURE — 99999 PR PBB SHADOW E&M-EST. PATIENT-LVL IV: CPT | Mod: PBBFAC,,, | Performed by: FAMILY MEDICINE

## 2021-07-30 PROCEDURE — 85025 COMPLETE CBC W/AUTO DIFF WBC: CPT | Performed by: FAMILY MEDICINE

## 2021-07-30 PROCEDURE — 87389 HIV-1 AG W/HIV-1&-2 AB AG IA: CPT | Performed by: FAMILY MEDICINE

## 2021-07-30 PROCEDURE — 36415 COLL VENOUS BLD VENIPUNCTURE: CPT | Mod: PO | Performed by: FAMILY MEDICINE

## 2021-07-30 PROCEDURE — 99999 PR PBB SHADOW E&M-EST. PATIENT-LVL IV: ICD-10-PCS | Mod: PBBFAC,,, | Performed by: FAMILY MEDICINE

## 2021-07-30 PROCEDURE — 84153 ASSAY OF PSA TOTAL: CPT | Performed by: FAMILY MEDICINE

## 2021-10-22 ENCOUNTER — TELEPHONE (OUTPATIENT)
Dept: UROLOGY | Facility: CLINIC | Age: 61
End: 2021-10-22

## 2021-10-25 ENCOUNTER — OFFICE VISIT (OUTPATIENT)
Dept: UROLOGY | Facility: CLINIC | Age: 61
End: 2021-10-25
Payer: OTHER GOVERNMENT

## 2021-10-25 ENCOUNTER — PATIENT OUTREACH (OUTPATIENT)
Dept: ADMINISTRATIVE | Facility: OTHER | Age: 61
End: 2021-10-25
Payer: OTHER GOVERNMENT

## 2021-10-25 ENCOUNTER — HOSPITAL ENCOUNTER (OUTPATIENT)
Dept: RADIOLOGY | Facility: HOSPITAL | Age: 61
Discharge: HOME | End: 2021-10-25
Attending: NURSE PRACTITIONER
Payer: OTHER GOVERNMENT

## 2021-10-25 ENCOUNTER — TELEPHONE (OUTPATIENT)
Dept: UROLOGY | Facility: CLINIC | Age: 61
End: 2021-10-25

## 2021-10-25 VITALS
DIASTOLIC BLOOD PRESSURE: 67 MMHG | HEIGHT: 69 IN | WEIGHT: 210.31 LBS | HEART RATE: 76 BPM | RESPIRATION RATE: 18 BRPM | SYSTOLIC BLOOD PRESSURE: 121 MMHG | BODY MASS INDEX: 31.15 KG/M2

## 2021-10-25 DIAGNOSIS — N50.89 LUMP IN THE TESTICLE: ICD-10-CM

## 2021-10-25 DIAGNOSIS — Z12.5 PROSTATE CANCER SCREENING: ICD-10-CM

## 2021-10-25 DIAGNOSIS — N50.89 TESTICLE SWELLING: ICD-10-CM

## 2021-10-25 DIAGNOSIS — Z12.5 PROSTATE CANCER SCREENING: Primary | ICD-10-CM

## 2021-10-25 DIAGNOSIS — N43.40 SPERMATOCELE: ICD-10-CM

## 2021-10-25 LAB
BILIRUB SERPL-MCNC: NORMAL MG/DL
BLOOD URINE, POC: NORMAL
CLARITY, POC UA: CLEAR
COLOR, POC UA: YELLOW
GLUCOSE UR QL STRIP: NORMAL
KETONES UR QL STRIP: NORMAL
LEUKOCYTE ESTERASE URINE, POC: NORMAL
NITRITE, POC UA: NORMAL
PH, POC UA: 0.2
POC RESIDUAL URINE VOLUME: 14 ML (ref 0–100)
PROTEIN, POC: NORMAL
SPECIFIC GRAVITY, POC UA: 1.03
UROBILINOGEN, POC UA: 0.2

## 2021-10-25 PROCEDURE — 99999 PR PBB SHADOW E&M-EST. PATIENT-LVL IV: ICD-10-PCS | Mod: PBBFAC,,, | Performed by: NURSE PRACTITIONER

## 2021-10-25 PROCEDURE — 99999 PR PBB SHADOW E&M-EST. PATIENT-LVL IV: CPT | Mod: PBBFAC,,, | Performed by: NURSE PRACTITIONER

## 2021-10-25 PROCEDURE — 76870 US EXAM SCROTUM: CPT | Mod: TC

## 2021-10-25 PROCEDURE — 51798 US URINE CAPACITY MEASURE: CPT | Mod: PBBFAC,PN | Performed by: NURSE PRACTITIONER

## 2021-10-25 PROCEDURE — 81002 URINALYSIS NONAUTO W/O SCOPE: CPT | Mod: PBBFAC,PN | Performed by: NURSE PRACTITIONER

## 2021-10-25 PROCEDURE — 99214 PR OFFICE/OUTPT VISIT, EST, LEVL IV, 30-39 MIN: ICD-10-PCS | Mod: S$PBB,,, | Performed by: NURSE PRACTITIONER

## 2021-10-25 PROCEDURE — 99214 OFFICE O/P EST MOD 30 MIN: CPT | Mod: S$PBB,,, | Performed by: NURSE PRACTITIONER

## 2021-10-25 PROCEDURE — 99214 OFFICE O/P EST MOD 30 MIN: CPT | Mod: PBBFAC,PN | Performed by: NURSE PRACTITIONER

## 2021-10-26 DIAGNOSIS — N50.89 MASS OF LEFT TESTICLE: Primary | ICD-10-CM

## 2022-03-20 ENCOUNTER — PATIENT MESSAGE (OUTPATIENT)
Dept: FAMILY MEDICINE | Facility: CLINIC | Age: 62
End: 2022-03-20
Payer: OTHER GOVERNMENT

## 2022-03-20 DIAGNOSIS — Z12.5 PROSTATE CANCER SCREENING: Primary | ICD-10-CM

## 2022-04-16 ENCOUNTER — PATIENT MESSAGE (OUTPATIENT)
Dept: FAMILY MEDICINE | Facility: CLINIC | Age: 62
End: 2022-04-16
Payer: OTHER GOVERNMENT

## 2022-07-19 ENCOUNTER — PATIENT MESSAGE (OUTPATIENT)
Dept: FAMILY MEDICINE | Facility: CLINIC | Age: 62
End: 2022-07-19
Payer: OTHER GOVERNMENT

## 2022-07-19 DIAGNOSIS — L98.9 SKIN LESION: Primary | ICD-10-CM

## 2022-07-20 ENCOUNTER — TELEPHONE (OUTPATIENT)
Dept: DERMATOLOGY | Facility: CLINIC | Age: 62
End: 2022-07-20
Payer: OTHER GOVERNMENT

## 2022-08-10 ENCOUNTER — OFFICE VISIT (OUTPATIENT)
Dept: DERMATOLOGY | Facility: CLINIC | Age: 62
End: 2022-08-10
Payer: OTHER GOVERNMENT

## 2022-08-10 ENCOUNTER — OFFICE VISIT (OUTPATIENT)
Dept: FAMILY MEDICINE | Facility: CLINIC | Age: 62
End: 2022-08-10
Payer: OTHER GOVERNMENT

## 2022-08-10 ENCOUNTER — LAB VISIT (OUTPATIENT)
Dept: LAB | Facility: HOSPITAL | Age: 62
End: 2022-08-10
Attending: FAMILY MEDICINE
Payer: OTHER GOVERNMENT

## 2022-08-10 VITALS
WEIGHT: 202.63 LBS | DIASTOLIC BLOOD PRESSURE: 66 MMHG | HEART RATE: 69 BPM | BODY MASS INDEX: 30.01 KG/M2 | HEIGHT: 69 IN | OXYGEN SATURATION: 95 % | SYSTOLIC BLOOD PRESSURE: 104 MMHG

## 2022-08-10 DIAGNOSIS — L98.9 SKIN LESION: ICD-10-CM

## 2022-08-10 DIAGNOSIS — L82.0 SEBORRHEIC KERATOSES, INFLAMED: ICD-10-CM

## 2022-08-10 DIAGNOSIS — Z00.00 WELLNESS EXAMINATION: ICD-10-CM

## 2022-08-10 DIAGNOSIS — Z12.11 SCREENING FOR COLON CANCER: ICD-10-CM

## 2022-08-10 DIAGNOSIS — L81.4 LENTIGO: ICD-10-CM

## 2022-08-10 DIAGNOSIS — M77.11 LATERAL EPICONDYLITIS OF RIGHT ELBOW: ICD-10-CM

## 2022-08-10 DIAGNOSIS — D18.01 CHERRY ANGIOMA: ICD-10-CM

## 2022-08-10 DIAGNOSIS — L82.1 SEBORRHEIC KERATOSES: ICD-10-CM

## 2022-08-10 DIAGNOSIS — L57.8 ACTINIC SKIN DAMAGE: ICD-10-CM

## 2022-08-10 DIAGNOSIS — D22.9 BENIGN NEVUS: ICD-10-CM

## 2022-08-10 DIAGNOSIS — Z00.00 WELLNESS EXAMINATION: Primary | ICD-10-CM

## 2022-08-10 DIAGNOSIS — L73.8 SEBACEOUS HYPERPLASIA: ICD-10-CM

## 2022-08-10 DIAGNOSIS — N50.89 SCROTAL SWELLING: ICD-10-CM

## 2022-08-10 DIAGNOSIS — L57.0 ACTINIC KERATOSIS: Primary | ICD-10-CM

## 2022-08-10 LAB
ALBUMIN SERPL BCP-MCNC: 3.8 G/DL (ref 3.5–5.2)
ALP SERPL-CCNC: 48 U/L (ref 55–135)
ALT SERPL W/O P-5'-P-CCNC: 21 U/L (ref 10–44)
ANION GAP SERPL CALC-SCNC: 8 MMOL/L (ref 8–16)
AST SERPL-CCNC: 20 U/L (ref 10–40)
BILIRUB SERPL-MCNC: 0.5 MG/DL (ref 0.1–1)
BUN SERPL-MCNC: 16 MG/DL (ref 8–23)
CALCIUM SERPL-MCNC: 9.1 MG/DL (ref 8.7–10.5)
CHLORIDE SERPL-SCNC: 107 MMOL/L (ref 95–110)
CHOLEST SERPL-MCNC: 214 MG/DL (ref 120–199)
CHOLEST/HDLC SERPL: 4.2 {RATIO} (ref 2–5)
CO2 SERPL-SCNC: 26 MMOL/L (ref 23–29)
CREAT SERPL-MCNC: 1 MG/DL (ref 0.5–1.4)
EST. GFR  (NO RACE VARIABLE): >60 ML/MIN/1.73 M^2
ESTIMATED AVG GLUCOSE: 108 MG/DL (ref 68–131)
GLUCOSE SERPL-MCNC: 84 MG/DL (ref 70–110)
HBA1C MFR BLD: 5.4 % (ref 4–5.6)
HDLC SERPL-MCNC: 51 MG/DL (ref 40–75)
HDLC SERPL: 23.8 % (ref 20–50)
HGB BLD-MCNC: 14.5 G/DL (ref 14–18)
LDLC SERPL CALC-MCNC: 134.6 MG/DL (ref 63–159)
NONHDLC SERPL-MCNC: 163 MG/DL
PLATELET # BLD AUTO: 256 K/UL (ref 150–450)
PMV BLD AUTO: 9.9 FL (ref 9.2–12.9)
POTASSIUM SERPL-SCNC: 4.2 MMOL/L (ref 3.5–5.1)
PROT SERPL-MCNC: 6.9 G/DL (ref 6–8.4)
SODIUM SERPL-SCNC: 141 MMOL/L (ref 136–145)
TRIGL SERPL-MCNC: 142 MG/DL (ref 30–150)
TSH SERPL DL<=0.005 MIU/L-ACNC: 1.72 UIU/ML (ref 0.4–4)

## 2022-08-10 PROCEDURE — 85049 AUTOMATED PLATELET COUNT: CPT | Performed by: FAMILY MEDICINE

## 2022-08-10 PROCEDURE — 99999 PR PBB SHADOW E&M-EST. PATIENT-LVL III: ICD-10-PCS | Mod: PBBFAC,,, | Performed by: STUDENT IN AN ORGANIZED HEALTH CARE EDUCATION/TRAINING PROGRAM

## 2022-08-10 PROCEDURE — 17000 DESTRUCT PREMALG LESION: CPT | Mod: S$PBB,,, | Performed by: STUDENT IN AN ORGANIZED HEALTH CARE EDUCATION/TRAINING PROGRAM

## 2022-08-10 PROCEDURE — 99999 PR PBB SHADOW E&M-EST. PATIENT-LVL IV: CPT | Mod: PBBFAC,,, | Performed by: FAMILY MEDICINE

## 2022-08-10 PROCEDURE — 17003 DESTRUCTION, PREMALIGNANT LESIONS; SECOND THROUGH 14 LESIONS: ICD-10-PCS | Mod: S$PBB,,, | Performed by: STUDENT IN AN ORGANIZED HEALTH CARE EDUCATION/TRAINING PROGRAM

## 2022-08-10 PROCEDURE — 99213 OFFICE O/P EST LOW 20 MIN: CPT | Mod: PBBFAC,PO | Performed by: STUDENT IN AN ORGANIZED HEALTH CARE EDUCATION/TRAINING PROGRAM

## 2022-08-10 PROCEDURE — 80061 LIPID PANEL: CPT | Performed by: FAMILY MEDICINE

## 2022-08-10 PROCEDURE — 99396 PR PREVENTIVE VISIT,EST,40-64: ICD-10-PCS | Mod: S$PBB,,, | Performed by: FAMILY MEDICINE

## 2022-08-10 PROCEDURE — 83036 HEMOGLOBIN GLYCOSYLATED A1C: CPT | Performed by: FAMILY MEDICINE

## 2022-08-10 PROCEDURE — 85018 HEMOGLOBIN: CPT | Performed by: FAMILY MEDICINE

## 2022-08-10 PROCEDURE — 17003 DESTRUCT PREMALG LES 2-14: CPT | Mod: S$PBB,,, | Performed by: STUDENT IN AN ORGANIZED HEALTH CARE EDUCATION/TRAINING PROGRAM

## 2022-08-10 PROCEDURE — 99999 PR PBB SHADOW E&M-EST. PATIENT-LVL IV: ICD-10-PCS | Mod: PBBFAC,,, | Performed by: FAMILY MEDICINE

## 2022-08-10 PROCEDURE — 80053 COMPREHEN METABOLIC PANEL: CPT | Performed by: FAMILY MEDICINE

## 2022-08-10 PROCEDURE — 84443 ASSAY THYROID STIM HORMONE: CPT | Performed by: FAMILY MEDICINE

## 2022-08-10 PROCEDURE — 99396 PREV VISIT EST AGE 40-64: CPT | Mod: S$PBB,,, | Performed by: FAMILY MEDICINE

## 2022-08-10 PROCEDURE — 99214 OFFICE O/P EST MOD 30 MIN: CPT | Mod: PBBFAC,27,PO | Performed by: FAMILY MEDICINE

## 2022-08-10 PROCEDURE — 99999 PR PBB SHADOW E&M-EST. PATIENT-LVL III: CPT | Mod: PBBFAC,,, | Performed by: STUDENT IN AN ORGANIZED HEALTH CARE EDUCATION/TRAINING PROGRAM

## 2022-08-10 PROCEDURE — 17000 PR DESTRUCTION(LASER SURGERY,CRYOSURGERY,CHEMOSURGERY),PREMALIGNANT LESIONS,FIRST LESION: ICD-10-PCS | Mod: S$PBB,,, | Performed by: STUDENT IN AN ORGANIZED HEALTH CARE EDUCATION/TRAINING PROGRAM

## 2022-08-10 PROCEDURE — 36415 COLL VENOUS BLD VENIPUNCTURE: CPT | Mod: PO | Performed by: FAMILY MEDICINE

## 2022-08-10 PROCEDURE — 17000 DESTRUCT PREMALG LESION: CPT | Mod: PBBFAC,PO | Performed by: STUDENT IN AN ORGANIZED HEALTH CARE EDUCATION/TRAINING PROGRAM

## 2022-08-10 PROCEDURE — 99213 PR OFFICE/OUTPT VISIT, EST, LEVL III, 20-29 MIN: ICD-10-PCS | Mod: 25,S$PBB,, | Performed by: STUDENT IN AN ORGANIZED HEALTH CARE EDUCATION/TRAINING PROGRAM

## 2022-08-10 PROCEDURE — 99213 OFFICE O/P EST LOW 20 MIN: CPT | Mod: 25,S$PBB,, | Performed by: STUDENT IN AN ORGANIZED HEALTH CARE EDUCATION/TRAINING PROGRAM

## 2022-08-10 PROCEDURE — 17003 DESTRUCT PREMALG LES 2-14: CPT | Mod: PBBFAC,PO | Performed by: STUDENT IN AN ORGANIZED HEALTH CARE EDUCATION/TRAINING PROGRAM

## 2022-08-10 NOTE — PROGRESS NOTES
Subjective:   Patient ID: Danny Chatman is a 62 y.o. male     Chief Complaint:Annual Exam      Here for checkup    Review of Systems   Constitutional: Negative for chills and fever.   HENT: Negative for sore throat and trouble swallowing.    Respiratory: Negative for cough and shortness of breath.    Cardiovascular: Negative for chest pain and leg swelling.   Gastrointestinal: Negative for abdominal distention and abdominal pain.   Genitourinary: Negative for dysuria and flank pain.   Musculoskeletal: Negative for arthralgias and back pain.   Skin: Negative for color change and pallor.   Neurological: Negative for weakness and headaches.   Psychiatric/Behavioral: Negative for agitation and confusion.     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  Physical Exam  Vitals and nursing note reviewed.   Constitutional:       Appearance: He is well-developed.   HENT:      Head: Normocephalic and atraumatic.   Eyes:      General: No scleral icterus.     Conjunctiva/sclera: Conjunctivae normal.   Cardiovascular:      Heart sounds: No murmur heard.  Pulmonary:      Effort: Pulmonary effort is normal. No respiratory distress.   Musculoskeletal:         General: No deformity. Normal range of motion.      Cervical back: Normal range of motion and neck supple.   Skin:     Coloration: Skin is not pale.      Findings: No rash.   Neurological:      Mental Status: He is alert and oriented to person, place, and time.    Psychiatric:         Behavior: Behavior normal.         Thought Content: Thought content normal.         Judgment: Judgment normal.       Assessment:     1. Wellness examination    2. Screening for colon cancer    3. Scrotal swelling    4. Skin lesion    5. Lateral epicondylitis of right elbow      Plan:   Wellness examination  -     Hemoglobin; Future; Expected date: 08/10/2022  -     Platelet Count; Future; Expected date: 08/10/2022  -     Lipid Panel; Future; Expected date: 08/10/2022  -     Comprehensive Metabolic Panel; Future; Expected date: 08/10/2022  -     Hemoglobin A1C; Future; Expected date: 08/10/2022  -     TSH; Future; Expected date: 08/10/2022    Screening for colon cancer  -     Ambulatory referral/consult to Gastroenterology; Future; Expected date: 08/17/2022    Scrotal swelling  -     Ambulatory referral/consult to Urology; Future; Expected date: 08/17/2022    Skin lesion  -     Ambulatory referral/consult to Dermatology; Future; Expected date: 08/17/2022    Lateral epicondylitis of right elbow  -     Ambulatory referral/consult to Orthopedics; Future; Expected date: 08/17/2022          Marvin Peñaloza MD  08/10/2022    Portions of this note have been dictated with MARIBEL Gagnon.

## 2022-08-10 NOTE — PROGRESS NOTES
Subjective:       Patient ID:  Danny Chatman is a 62 y.o. male who presents for   Chief Complaint   Patient presents with    Skin Check     UBSE    Spot     Right elbow/nose     LOV 8/3/20 Jef    Patient here today for skin check UBSE  C/o spot on right elbow x years, getting bigger   C/o spot on nose x years, bothersome when his glasses rub it    Denies Phx of NMSC  Mother had MM      Review of Systems   Constitutional: Negative for fever, chills and fatigue.   Respiratory: Negative for cough and shortness of breath.    Gastrointestinal: Negative for nausea and vomiting.   Skin: Positive for activity-related sunscreen use and wears hat. Negative for daily sunscreen use.   Hematologic/Lymphatic: Does not bruise/bleed easily.        Objective:    Physical Exam   Constitutional: He appears well-developed and well-nourished. No distress.   Neurological: He is alert and oriented to person, place, and time. He is not disoriented.   Psychiatric: He has a normal mood and affect.   Skin:   Areas Examined (abnormalities noted in diagram):   Scalp / Hair Palpated and Inspected  Head / Face Inspection Performed  Neck Inspection Performed  Chest / Axilla Inspection Performed  Abdomen Inspection Performed  Back Inspection Performed  RUE Inspected  LUE Inspection Performed  Nails and Digits Inspection Performed                       Diagram Legend     Erythematous scaling macule/papule c/w actinic keratosis       Vascular papule c/w angioma      Pigmented verrucoid papule/plaque c/w seborrheic keratosis      Yellow umbilicated papule c/w sebaceous hyperplasia      Irregularly shaped tan macule c/w lentigo     1-2 mm smooth white papules consistent with Milia      Movable subcutaneous cyst with punctum c/w epidermal inclusion cyst      Subcutaneous movable cyst c/w pilar cyst      Firm pink to brown papule c/w dermatofibroma      Pedunculated fleshy papule(s) c/w skin tag(s)      Evenly pigmented macule c/w junctional  nevus     Mildly variegated pigmented, slightly irregular-bordered macule c/w mildly atypical nevus      Flesh colored to evenly pigmented papule c/w intradermal nevus       Pink pearly papule/plaque c/w basal cell carcinoma      Erythematous hyperkeratotic cursted plaque c/w SCC      Surgical scar with no sign of skin cancer recurrence      Open and closed comedones      Inflammatory papules and pustules      Verrucoid papule consistent consistent with wart     Erythematous eczematous patches and plaques     Dystrophic onycholytic nail with subungual debris c/w onychomycosis     Umbilicated papule    Erythematous-base heme-crusted tan verrucoid plaque consistent with inflamed seborrheic keratosis     Erythematous Silvery Scaling Plaque c/w Psoriasis     See annotation      Assessment / Plan:        Actinic keratosis  Cryosurgery Procedure Note    Verbal consent from the patient is obtained and the patient is aware of the precancerous quality and need for treatment of these lesions. Liquid nitrogen cryosurgery is applied to the 5 actinic keratoses, as detailed in the physical exam, to produce a freeze injury. The patient is aware that blisters may form and is instructed on wound care with gentle cleansing and use of vaseline ointment to keep moist until healed. The patient is supplied a handout on cryosurgery and is instructed to call if lesions do not completely resolve.    Cherry angioma  This is a benign vascular lesion. Reassurance given. No treatment required.     Seborrheic keratoses  Seborrheic keratoses, inflamed  These are benign inherited growths without a malignant potential. Reassurance given to patient. No treatment is necessary.   Discussed risks and benefits of LN2 to right elbow    Benign nevus  Careful dermoscopy evaluation of nevi performed with none identified as needing biopsy today  Monitor for new mole or moles that are becoming bigger, darker, irritated, or developing irregular borders.      Lentigo  This is a benign hyperpigmented sun induced lesion. Daily sun protection will reduce the number of new lesions. Treatment of these benign lesions are considered cosmetic.    Actinic skin damage  Upper body skin examination performed today including at least 9 points as noted in physical examination. No lesions suspicious for malignancy noted.  Patient instructed in importance in daily broad spectrum sun protection of at least spf 30. Mineral sunscreen ingredients preferred (Zinc +/- Titanium) and can be found OTC.   Patient encouraged to wear hat for all outdoor exposure.   Also discussed sun avoidance and use of protective clothing.    Sebaceous hyperplasia  This is a common condition representing benign enlargement of the sebaceous lobule. It typically occurs in adulthood. Reassurance given to patient.            1 year  No follow-ups on file.

## 2022-08-10 NOTE — PATIENT INSTRUCTIONS
Al Delgado,     If you are due for any health screening(s) below please notify me so we can arrange them to be ordered and scheduled to maintain your health. Most healthy patients complete it. Don't lose out on improving your health.     Tests to Keep You Healthy    Colon Cancer Screening: DUE        Colon Cancer Screening    Of cancers affecting both men and women, colorectal cancer is the third leading cancer killer in the [place]. But it doesnt have to be. Screening can prevent colorectal cancer or find it at an early stage when treatment often leads to a cure.    A colonoscopy is the preferred test for detecting colon cancer. It is needed only once every 10 years if results are negative. While sedated, a flexible, lighted tube with a tiny camera is inserted into the rectum and advanced through the colon to look for cancers. An alternative screening test that is used at home and returned to the lab may also be used. It detects hidden blood in bowel movements which could indicate cancer in the colon. If results are positive, you will need a colonoscopy to determine if the blood is a sign of cancer. This type of follow up (diagnostic) colonoscopy usually requires additional copays as required by your insurance provider. Please contact your PCP if you have any questions.    Although you are still overdue for this important screening, due to the COVID-19 pandemic, we recommend scheduling it in the near future.

## 2022-08-10 NOTE — PATIENT INSTRUCTIONS

## 2022-08-11 ENCOUNTER — OFFICE VISIT (OUTPATIENT)
Dept: ORTHOPEDICS | Facility: CLINIC | Age: 62
End: 2022-08-11
Payer: OTHER GOVERNMENT

## 2022-08-11 ENCOUNTER — TELEPHONE (OUTPATIENT)
Dept: DERMATOLOGY | Facility: CLINIC | Age: 62
End: 2022-08-11
Payer: OTHER GOVERNMENT

## 2022-08-11 ENCOUNTER — HOSPITAL ENCOUNTER (OUTPATIENT)
Dept: RADIOLOGY | Facility: HOSPITAL | Age: 62
Discharge: HOME | End: 2022-08-11
Attending: ORTHOPAEDIC SURGERY
Payer: OTHER GOVERNMENT

## 2022-08-11 VITALS — WEIGHT: 202 LBS | BODY MASS INDEX: 29.92 KG/M2 | HEIGHT: 69 IN | RESPIRATION RATE: 18 BRPM

## 2022-08-11 DIAGNOSIS — M65.9 ECRB (EXTENSOR CARPI RADIALIS BREVIS) TENOSYNOVITIS: Primary | ICD-10-CM

## 2022-08-11 DIAGNOSIS — M25.521 RIGHT ELBOW PAIN: Primary | ICD-10-CM

## 2022-08-11 DIAGNOSIS — M77.11 LATERAL EPICONDYLITIS OF RIGHT ELBOW: ICD-10-CM

## 2022-08-11 DIAGNOSIS — M25.521 RIGHT ELBOW PAIN: ICD-10-CM

## 2022-08-11 PROCEDURE — 99243 PR OFFICE CONSULTATION,LEVEL III: ICD-10-PCS | Mod: S$PBB,,, | Performed by: ORTHOPAEDIC SURGERY

## 2022-08-11 PROCEDURE — 99243 OFF/OP CNSLTJ NEW/EST LOW 30: CPT | Mod: S$PBB,,, | Performed by: ORTHOPAEDIC SURGERY

## 2022-08-11 PROCEDURE — 73080 XR ELBOW COMPLETE 3 VIEW RIGHT: ICD-10-PCS | Mod: 26,RT,, | Performed by: RADIOLOGY

## 2022-08-11 PROCEDURE — 99213 OFFICE O/P EST LOW 20 MIN: CPT | Mod: PBBFAC,PN | Performed by: ORTHOPAEDIC SURGERY

## 2022-08-11 PROCEDURE — 73080 X-RAY EXAM OF ELBOW: CPT | Mod: TC,PN,RT

## 2022-08-11 PROCEDURE — 99999 PR PBB SHADOW E&M-EST. PATIENT-LVL III: ICD-10-PCS | Mod: PBBFAC,,, | Performed by: ORTHOPAEDIC SURGERY

## 2022-08-11 PROCEDURE — 73080 X-RAY EXAM OF ELBOW: CPT | Mod: 26,RT,, | Performed by: RADIOLOGY

## 2022-08-11 PROCEDURE — 99999 PR PBB SHADOW E&M-EST. PATIENT-LVL III: CPT | Mod: PBBFAC,,, | Performed by: ORTHOPAEDIC SURGERY

## 2022-08-11 NOTE — PROGRESS NOTES
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     No current facility-administered medications for this visit.       Review of patient's allergies indicates:  No Known Allergies    Family History   Problem Relation Age of Onset    Skin cancer Mother     Cerebral aneurysm Mother     No Known Problems Maternal Grandmother     No Known Problems Maternal Grandfather     No Known Problems Paternal Grandmother     No Known Problems Paternal Grandfather     Amblyopia Neg Hx     Blindness Neg Hx     Cancer Neg Hx     Cataracts Neg Hx     Glaucoma Neg Hx     Diabetes Neg Hx     Hypertension Neg Hx     Macular degeneration Neg Hx     Retinal detachment Neg Hx     Strabismus Neg Hx     Stroke Neg Hx     Thyroid disease Neg Hx     Melanoma Neg Hx     Psoriasis Neg Hx     Lupus Neg Hx     Eczema Neg Hx        Social History     Socioeconomic History    Marital status:    Tobacco Use    Smoking status: Never Smoker    Smokeless tobacco: Never Used   Substance and Sexual Activity    Alcohol use: Yes     Alcohol/week: 0.0 standard drinks     Comment: rare    Drug use: No    Sexual activity: Yes     Partners: Female     Social Determinants of Health     Financial Resource Strain: Low Risk     Difficulty of Paying Living Expenses: Not hard at all   Food Insecurity: No Food Insecurity    Worried About Running Out of Food in the Last Year: Never  true    Ran Out of Food in the Last Year: Never true   Transportation Needs: No Transportation Needs    Lack of Transportation (Medical): No    Lack of Transportation (Non-Medical): No   Physical Activity: Sufficiently Active    Days of Exercise per Week: 6 days    Minutes of Exercise per Session: 60 min   Stress: No Stress Concern Present    Feeling of Stress : Not at all   Social Connections: Unknown    Frequency of Communication with Friends and Family: More than three times a week    Frequency of Social Gatherings with Friends and Family: More than three times a week    Active Member of Clubs or Organizations: No    Attends Club or Organization Meetings: Never    Marital Status:    Housing Stability: Low Risk     Unable to Pay for Housing in the Last Year: No    Number of Places Lived in the Last Year: 1    Unstable Housing in the Last Year: No       Chief Complaint:   Chief Complaint   Patient presents with    Elbow Pain     eval right elbow        History of present illness:  62-year-old male seen in consultation for Dr. Peñaloza for right elbow pain.  Patient has had lateral elbow pain for years.  Used to play a lot of baseball.  He recalls 1 time throwing a ball and feeling a pop in his elbow.  It has never been normal since then.  A lot of pain along the lateral aspect of the joint and in the proximal forearm.  He has modified his exercise regimen to lot but the pain continues.  Pain is 6/10.      Answers for HPI/ROS submitted by the patient on 8/11/2022  unexpected weight change: No  appetite change : No  sleep disturbance: No  IMMUNOCOMPROMISED: No  nervous/ anxious: No  dysphoric mood: No  rash: No  visual disturbance: No  eye redness: No  eye pain: No  ear pain: No  tinnitus: Yes  hearing loss: Yes  sinus pressure : No  nosebleeds: No  enviro allergies: No  food allergies: No  cough: No  shortness of breath: No  sweating: No  frequency: No  difficulty urinating: No  hematuria:  No  chest pain: No  palpitations: No  nausea: No  vomiting: No  diarrhea: No  blood in stool: No  constipation: No  headaches: No  dizziness: No  numbness: No  seizures: No  joint swelling: No  myalgia: No  weakness: No  back pain: No  Pain Chronicity: recurrent  History of trauma: No  Onset: more than 1 year ago  Frequency: constantly  Progression since onset: unchanged  Injury mechanism: throwing  injury location: on the field  pain- numeric: 6/10  pain location: right elbow  pain quality: sharp  Radiating Pain: No  Aggravating factors: activity, extension, rotation  fever: No  inability to bear weight: No  itching: No  joint locking: No  limited range of motion: Yes  stiffness: No  tingling: No  Treatments tried: brace/corset, OTC ointments, OTC pain meds, rest, other  physical therapy: not tried  Improvement on treatment: no relief        Physical Examination:    Vital Signs:    Vitals:    08/11/22 1023   Resp: 18       Body mass index is 29.83 kg/m².    This a well-developed, well nourished patient in no acute distress.  They are alert and oriented and cooperative to examination.  Pt. walks without an antalgic gait.      Examination of the right elbow shows range of motion from about 5° to 140°.  Lacks the terminal 5° of full supination.  Tenderness along the lateral joint in the radiocapitellar area as well as along the lateral epicondyle.  Neurovascularly intact.    X-rays:  X-ray of the right elbow was ordered and review which show some mild to moderate degenerative changes of the elbow joint as well as some spurring along the olecranon.     Assessment::  Possible right extensor tendon tear    Plan:  Reviewed the findings with him today.  We talked about possibly getting an MRI to evaluate for a possible tendon tear at the elbow.  Patient did have an acute trauma has had pain since then.  We talked about how we does have some underlying arthritis which could also be contributing to some of his symptoms.   Follow-up after the MRI is completed.    This note was created using [employer] voice recognition software that occasionally misinterpreted phrases or words.    Consult note is delivered via Epic messaging service.

## 2022-08-11 NOTE — TELEPHONE ENCOUNTER
Derm referral: spoke with patient. He saw Dr. Monaco 8/10/2022 but referral not linked. When mentioned referral to be cancelled, because he'd been seen, he asks if insurance needs it for coverage.  Unable to do that today, so told patient to contact 's office via portal or insurance for certainty covered. He agrees and thanks for call.

## 2022-08-24 ENCOUNTER — HOSPITAL ENCOUNTER (OUTPATIENT)
Dept: RADIOLOGY | Facility: HOSPITAL | Age: 62
Discharge: HOME | End: 2022-08-24
Attending: ORTHOPAEDIC SURGERY
Payer: OTHER GOVERNMENT

## 2022-08-24 DIAGNOSIS — M65.9 ECRB (EXTENSOR CARPI RADIALIS BREVIS) TENOSYNOVITIS: ICD-10-CM

## 2022-08-24 PROCEDURE — 73221 MRI JOINT UPR EXTREM W/O DYE: CPT | Mod: 26,RT,, | Performed by: RADIOLOGY

## 2022-08-24 PROCEDURE — 73221 MRI JOINT UPR EXTREM W/O DYE: CPT | Mod: TC,RT

## 2022-08-24 PROCEDURE — 73221 MRI ELBOW WITHOUT CONTRAST RIGHT: ICD-10-PCS | Mod: 26,RT,, | Performed by: RADIOLOGY

## 2022-08-29 ENCOUNTER — OFFICE VISIT (OUTPATIENT)
Dept: ORTHOPEDICS | Facility: CLINIC | Age: 62
End: 2022-08-29
Payer: OTHER GOVERNMENT

## 2022-08-29 VITALS — WEIGHT: 202 LBS | HEIGHT: 69 IN | BODY MASS INDEX: 29.92 KG/M2 | RESPIRATION RATE: 18 BRPM

## 2022-08-29 DIAGNOSIS — M24.021 LOOSE BODY OF ELBOW, RIGHT: ICD-10-CM

## 2022-08-29 DIAGNOSIS — M65.9 ECRB (EXTENSOR CARPI RADIALIS BREVIS) TENOSYNOVITIS: Primary | ICD-10-CM

## 2022-08-29 PROCEDURE — 99999 PR PBB SHADOW E&M-EST. PATIENT-LVL III: ICD-10-PCS | Mod: PBBFAC,,, | Performed by: ORTHOPAEDIC SURGERY

## 2022-08-29 PROCEDURE — 99213 OFFICE O/P EST LOW 20 MIN: CPT | Mod: PBBFAC,PN | Performed by: ORTHOPAEDIC SURGERY

## 2022-08-29 PROCEDURE — 99213 PR OFFICE/OUTPT VISIT, EST, LEVL III, 20-29 MIN: ICD-10-PCS | Mod: S$PBB,,, | Performed by: ORTHOPAEDIC SURGERY

## 2022-08-29 PROCEDURE — 99999 PR PBB SHADOW E&M-EST. PATIENT-LVL III: CPT | Mod: PBBFAC,,, | Performed by: ORTHOPAEDIC SURGERY

## 2022-08-29 PROCEDURE — 99213 OFFICE O/P EST LOW 20 MIN: CPT | Mod: S$PBB,,, | Performed by: ORTHOPAEDIC SURGERY

## 2022-08-29 NOTE — PROGRESS NOTES
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     No current facility-administered medications for this visit.       Review of patient's allergies indicates:  No Known Allergies    Family History   Problem Relation Age of Onset    Skin cancer Mother     Cerebral aneurysm Mother     No Known Problems Maternal Grandmother     No Known Problems Maternal Grandfather     No Known Problems Paternal Grandmother     No Known Problems Paternal Grandfather     Amblyopia Neg Hx     Blindness Neg Hx     Cancer Neg Hx     Cataracts Neg Hx     Glaucoma Neg Hx     Diabetes Neg Hx     Hypertension Neg Hx     Macular degeneration Neg Hx     Retinal detachment Neg Hx     Strabismus Neg Hx     Stroke Neg Hx     Thyroid disease Neg Hx     Melanoma Neg Hx     Psoriasis Neg Hx     Lupus Neg Hx     Eczema Neg Hx        Social History     Socioeconomic History    Marital status:    Tobacco Use    Smoking status: Never    Smokeless tobacco: Never   Substance and Sexual Activity    Alcohol use: Yes     Alcohol/week: 0.0 standard drinks     Comment: rare    Drug use: No    Sexual activity: Yes     Partners: Female     Social Determinants of Health     Financial Resource Strain: Low Risk     Difficulty of Paying Living Expenses: Not hard at all   Food Insecurity: No Food Insecurity    Worried About Running Out of Food in the Last Year: Never true    Ran Out of Food in the Last Year: Never true    Transportation Needs: No Transportation Needs    Lack of Transportation (Medical): No    Lack of Transportation (Non-Medical): No   Physical Activity: Sufficiently Active    Days of Exercise per Week: 6 days    Minutes of Exercise per Session: 60 min   Stress: No Stress Concern Present    Feeling of Stress : Not at all   Social Connections: Unknown    Frequency of Communication with Friends and Family: More than three times a week    Frequency of Social Gatherings with Friends and Family: More than three times a week    Active Member of Clubs or Organizations: No    Attends Club or Organization Meetings: Never    Marital Status:    Housing Stability: Low Risk     Unable to Pay for Housing in the Last Year: No    Number of Places Lived in the Last Year: 1    Unstable Housing in the Last Year: No       Chief Complaint:   Chief Complaint   Patient presents with    Follow-up     fu post MRI right elbow        History of present illness:  62-year-old male seen in consultation for Dr. Peñaloza for right elbow pain.  Patient has had lateral elbow pain for years.  Used to play a lot of baseball.  He recalls 1 time throwing a ball and feeling a pop in his elbow.  It has never been normal since then.  A lot of pain along the lateral aspect of the joint and in the proximal forearm.  He has modified his exercise regimen to lot but the pain continues.  Pain is 6/10.      Answers for HPI/ROS submitted by the patient on 8/11/2022  unexpected weight change: No  appetite change : No  sleep disturbance: No  IMMUNOCOMPROMISED: No  nervous/ anxious: No  dysphoric mood: No  rash: No  visual disturbance: No  eye redness: No  eye pain: No  ear pain: No  tinnitus: Yes  hearing loss: Yes  sinus pressure : No  nosebleeds: No  enviro allergies: No  food allergies: No  cough: No  shortness of breath: No  sweating: No  frequency: No  difficulty urinating: No  hematuria: No  chest pain: No  palpitations: No  nausea: No  vomiting:  No  diarrhea: No  blood in stool: No  constipation: No  headaches: No  dizziness: No  numbness: No  seizures: No  joint swelling: No  myalgia: No  weakness: No  back pain: No  Pain Chronicity: recurrent  History of trauma: No  Onset: more than 1 year ago  Frequency: constantly  Progression since onset: unchanged  Injury mechanism: throwing  injury location: on the field  pain- numeric: 6/10  pain location: right elbow  pain quality: sharp  Radiating Pain: No  Aggravating factors: activity, extension, rotation  fever: No  inability to bear weight: No  itching: No  joint locking: No  limited range of motion: Yes  stiffness: No  tingling: No  Treatments tried: brace/corset, OTC ointments, OTC pain meds, rest, other  physical therapy: not tried  Improvement on treatment: no relief        Physical Examination:    Vital Signs:    Vitals:    08/29/22 0914   Resp: 18       Body mass index is 29.83 kg/m².    This a well-developed, well nourished patient in no acute distress.  They are alert and oriented and cooperative to examination.  Pt. walks without an antalgic gait.      Examination of the right elbow shows range of motion from about 5° to 140°.  Lacks the terminal 5° of full supination.  Tenderness along the lateral joint in the radiocapitellar area as well as along the lateral epicondyle.  Neurovascularly intact.    X-rays:  X-ray of the right elbow was  review which show some mild to moderate degenerative changes of the elbow joint as well as some spurring along the olecranon.    MRI of the right elbow is reviewed and interpreted:1. Partial-thickness tear at the common extensor tendon.  2. Elbow degenerative change including focal areas of grade 4 chondrosis along the capitellum.  3. Small joint effusion with several intra-articular bodies.     Assessment::  Partial right common extensor tendon tear  Right radiocapitellar arthritis with loose bodies    Plan:  Reviewed the findings with him today.  Patient did have an  acute trauma has had pain since then.  We talked about how we does have some underlying arthritis which could also be contributing to some of his symptoms.  Recommended consultation with some I who performs elbow arthroscopy.  I think he would be a good candidate for an elbow scope as well as possibly doing a tennis elbow release arthroscopically.  Removing the loose bodies and addressing some of the arthritis and spurring which definitely helped him versus just doing 1 or the other.    This note was created using [employer] voice recognition software that occasionally misinterpreted phrases or words.    Consult note is delivered via Epic messaging service.

## 2022-08-31 ENCOUNTER — PATIENT MESSAGE (OUTPATIENT)
Dept: FAMILY MEDICINE | Facility: CLINIC | Age: 62
End: 2022-08-31
Payer: OTHER GOVERNMENT

## 2022-09-06 ENCOUNTER — PATIENT MESSAGE (OUTPATIENT)
Dept: ORTHOPEDICS | Facility: CLINIC | Age: 62
End: 2022-09-06
Payer: OTHER GOVERNMENT

## 2022-09-06 DIAGNOSIS — M65.9 ECRB (EXTENSOR CARPI RADIALIS BREVIS) TENOSYNOVITIS: Primary | ICD-10-CM

## 2022-09-07 ENCOUNTER — PATIENT MESSAGE (OUTPATIENT)
Dept: FAMILY MEDICINE | Facility: CLINIC | Age: 62
End: 2022-09-07
Payer: OTHER GOVERNMENT

## 2022-09-07 ENCOUNTER — PATIENT MESSAGE (OUTPATIENT)
Dept: ORTHOPEDICS | Facility: CLINIC | Age: 62
End: 2022-09-07
Payer: OTHER GOVERNMENT

## 2022-09-14 ENCOUNTER — OFFICE VISIT (OUTPATIENT)
Dept: SPORTS MEDICINE | Facility: CLINIC | Age: 62
End: 2022-09-14
Payer: OTHER GOVERNMENT

## 2022-09-14 VITALS
SYSTOLIC BLOOD PRESSURE: 107 MMHG | DIASTOLIC BLOOD PRESSURE: 68 MMHG | WEIGHT: 199 LBS | BODY MASS INDEX: 29.47 KG/M2 | HEART RATE: 60 BPM | HEIGHT: 69 IN

## 2022-09-14 DIAGNOSIS — M65.9 ECRB (EXTENSOR CARPI RADIALIS BREVIS) TENOSYNOVITIS: ICD-10-CM

## 2022-09-14 DIAGNOSIS — M19.021 ARTHRITIS OF RIGHT ELBOW: Primary | ICD-10-CM

## 2022-09-14 PROCEDURE — 20605 INTERMEDIATE JOINT ASPIRATION/INJECTION: R ELBOW: ICD-10-PCS | Mod: S$PBB,RT,, | Performed by: ORTHOPAEDIC SURGERY

## 2022-09-14 PROCEDURE — 99213 OFFICE O/P EST LOW 20 MIN: CPT | Mod: PBBFAC | Performed by: ORTHOPAEDIC SURGERY

## 2022-09-14 PROCEDURE — 99204 OFFICE O/P NEW MOD 45 MIN: CPT | Mod: 25,S$PBB,, | Performed by: ORTHOPAEDIC SURGERY

## 2022-09-14 PROCEDURE — 20605 DRAIN/INJ JOINT/BURSA W/O US: CPT | Mod: PBBFAC | Performed by: ORTHOPAEDIC SURGERY

## 2022-09-14 PROCEDURE — 99999 PR PBB SHADOW E&M-EST. PATIENT-LVL III: ICD-10-PCS | Mod: PBBFAC,,, | Performed by: ORTHOPAEDIC SURGERY

## 2022-09-14 PROCEDURE — 99204 PR OFFICE/OUTPT VISIT, NEW, LEVL IV, 45-59 MIN: ICD-10-PCS | Mod: 25,S$PBB,, | Performed by: ORTHOPAEDIC SURGERY

## 2022-09-14 PROCEDURE — 99999 PR PBB SHADOW E&M-EST. PATIENT-LVL III: CPT | Mod: PBBFAC,,, | Performed by: ORTHOPAEDIC SURGERY

## 2022-09-14 RX ORDER — TRIAMCINOLONE ACETONIDE 40 MG/ML
40 INJECTION, SUSPENSION INTRA-ARTICULAR; INTRAMUSCULAR
Status: DISCONTINUED | OUTPATIENT
Start: 2022-09-14 | End: 2022-09-14 | Stop reason: HOSPADM

## 2022-09-14 RX ADMIN — TRIAMCINOLONE ACETONIDE 40 MG: 40 INJECTION, SUSPENSION INTRA-ARTICULAR; INTRAMUSCULAR at 09:09

## 2022-09-14 NOTE — PROGRESS NOTES
NEW PATIENT    HISTORY OF PRESENT ILLNESS   62 y.o. Male with a history of right elbow pain who is referred by Dr. Cade Levine today. 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 He also enjoys riding motorcycles and dirt bikes. He states he first had pain while throwing several years ago. He has most pain on the lateral and the posterior elbow. He has pain with supination. He denies mechanical symptoms. He also has pain with terminal extension. He states he does not have to be moving the elbow to have pain. He has not done anything to treat the symptoms.    Is affecting ADLs.  Pain is 6/10 at it's worst.        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    Colon polyp     Hyperlipidemia        PAST SURGICAL HISTORY     Past Surgical History:   Procedure Laterality Date    ANTERIOR CRUCIATE LIGAMENT REPAIR Right 1999    COLONOSCOPY N/A 12/6/2016    Procedure: COLONOSCOPY;  Surgeon: Lucho Vera MD;  Location: [facility];  Service: Endoscopy;  Laterality: N/A;    FINGER FRACTURE SURGERY      R index    VASECTOMY  1989       FAMILY HISTORY    Family History   Problem Relation Age of Onset    Skin cancer Mother     Cerebral aneurysm Mother     No Known Problems Maternal Grandmother     No Known Problems Maternal Grandfather     No Known Problems Paternal Grandmother     No Known Problems Paternal Grandfather     Amblyopia Neg Hx     Blindness Neg Hx     Cancer Neg Hx     Cataracts Neg Hx     Glaucoma Neg Hx     Diabetes Neg Hx     Hypertension Neg Hx     Macular degeneration Neg Hx     Retinal detachment Neg Hx     Strabismus Neg Hx     Stroke Neg Hx     Thyroid disease Neg Hx     Melanoma Neg Hx     Psoriasis Neg Hx     Lupus Neg Hx     Eczema Neg Hx        SOCIAL HISTORY    Social History     Socioeconomic History    Marital status:    Tobacco Use    Smoking status: Never    Smokeless  tobacco: Never   Substance and Sexual Activity    Alcohol use: Yes     Alcohol/week: 0.0 standard drinks     Comment: rare    Drug use: No    Sexual activity: Yes     Partners: Female     Social Determinants of Health     Financial Resource Strain: Low Risk     Difficulty of Paying Living Expenses: Not hard at all   Food Insecurity: No Food Insecurity    Worried About Running Out of Food in the Last Year: Never true    Ran Out of Food in the Last Year: Never true   Transportation Needs: No Transportation Needs    Lack of Transportation (Medical): No    Lack of Transportation (Non-Medical): No   Physical Activity: Sufficiently Active    Days of Exercise per Week: 6 days    Minutes of Exercise per Session: 60 min   Stress: No Stress Concern Present    Feeling of Stress : Not at all   Social Connections: Unknown    Frequency of Communication with Friends and Family: More than three times a week    Frequency of Social Gatherings with Friends and Family: More than three times a week    Active Member of Clubs or Organizations: No    Attends Club or Organization Meetings: Never    Marital Status:    Housing Stability: Low Risk     Unable to Pay for Housing in the Last Year: No    Number of Places Lived in the Last Year: 1    Unstable Housing in the Last Year: No       MEDICATIONS      Current Outpatient Medications:     MULTIVIT-MIN/FA/LYCOPEN/LUTEIN (CENTRUM SILVER ULTRA MEN'S ORAL), Take 1 tablet by mouth once daily., Disp: , Rfl:     ALLERGIES     Review of patient's allergies indicates:  No Known Allergies      REVIEW OF SYSTEMS   Constitution: Negative. Negative for chills, fever and night sweats.   HENT: Negative for congestion and headaches.    Eyes: Negative for blurred vision, left vision loss and right vision loss.   Cardiovascular: Negative for chest pain and syncope.   Respiratory: Negative for cough and shortness of breath.    Endocrine: Negative for polydipsia, polyphagia and polyuria.  "  Hematologic/Lymphatic: Negative for bleeding problem. Does not bruise/bleed easily.   Skin: Negative for dry skin, itching and rash.   Musculoskeletal: Negative for falls. Positive for right elbow pain  Gastrointestinal: Negative for abdominal pain and bowel incontinence.   Genitourinary: Negative for bladder incontinence and nocturia.   Neurological: Negative for disturbances in coordination, loss of balance and seizures.   Psychiatric/Behavioral: Negative for depression. The patient does not have insomnia.    Allergic/Immunologic: Negative for hives and persistent infections.     PHYSICAL EXAMINATION    Vitals: /68   Pulse 60   Ht 5' 9" (1.753 m)   Wt 90.3 kg (199 lb)   BMI 29.39 kg/m²     General: The patient appears active and healthy with no apparent physical problems.  No disturbance of mood or affect is demonstrated. Alert and Oriented.    HEENT: Eyes normal, pupils equally round, nose normal.    Resp: Equal and symmetrical chest rises. No wheezing    CV: Regular rate    Neck: Supple; nonpainful range of motion.    Extremities: no cyanosis, clubbing, edema, or diffuse swelling.  Palpable pulses, good capillary refill of the digits.  No coolness, discoloration, edema or obvious varicosities.    Skin: no lesions noted.    Lymphatic: no detected adenopathy in the upper or lower extremities.    Neurologic: normal mental status, normal reflexes, normal gait and balance.  Patient is alert and oriented to person, place and time.  No flaccidity or spasticity is noted.  No motor or sensory deficits are noted.  Light touch is intact    Orthopaedic:     Elbow Exam-RIGHT    Inspection: Normal skin color and appearance, no ecchymosis, no swelling, no scars.  There is a normal carrying angle.      ROM: 10° - 135+° with 80° supination and 80° pronation.       Palpation: No tenderness at the medial epicondyle,  radial head, or lateral epicondyle. Mild tenderness lateral epicondyle, olecranon     The wrist " flexor-pronator muscle group is nontender.    The wrist extensor mobile wad is nontender.    Strength: Flexor and extensor motor strength is 5/5.      Stability: Varus and valgus stress reveals no instability. No Guarding    Neuro Reflexes are 2/2 biceps, brachioradialis and triceps. Sensation intact    Test: - Milking maneuver - VEO - Thinker's - Tinel's Sign - Ulnar nerve subluxation - Hook Test - Pain with resisted wrist ext - Pain with long finger ext      IMAGING    MRI Elbow Joint Without Contrast Right  Narrative: EXAMINATION:  MRI ELBOW WITHOUT CONTRAST RIGHT    CLINICAL HISTORY:  right elbow extensor tendon tear;  Synovitis and tenosynovitis, unspecified    TECHNIQUE:  Multiplanar multi sequence MR imaging of the right elbow without the use of contrast.    COMPARISON:  None    FINDINGS:  Exam limited by motion and patient oblique positioning, despite multiple repeat attempts.    Bones: No acute fracture, avascular necrosis or marrow replacement.  Marginal spurring at multiple sites.    Joint: There is no malalignment.  There is a joint effusion and there is a rounded hypointense signal focus along the olecranon fossa 4 mm series 5, image 13.  Another such focus measuring 8 mm can be seen axial series 7, image 17.   there are focal areas of full-thickness chondral loss with subjacent marrow edema along the capitellum.    Ulnar Collateral Ligament: Nondiagnostic assessment due to motion and oblique positioning.    Common Flexor Tendon: Intact.    Lateral ligaments: Radial collateral and lateral ulnar collateral ligaments are intact.    Common Extensor Tendon: Fluid cleft along the undersurface of the common extensor tendon, anterior half.    Biceps Tendon: Intact.    Triceps Tendon: Intact.    Soft Tissues: No mass or focal fluid. Normal muscle volume and signal.  Impression: Exam is of diminished diagnostic quality.    1. Partial-thickness tear at the common extensor tendon.  2. Elbow degenerative change  including focal areas of grade 4 chondrosis along the capitellum.  3. Small joint effusion with several intra-articular bodies.    Electronically signed by: Michi Reed  Date:    08/24/2022  Time:    18:19      IMPRESSION       ICD-10-CM ICD-9-CM   1. Arthritis of right elbow  M19.021 716.92   2. ECRB (extensor carpi radialis brevis) tenosynovitis  M65.9 727.05       MEDICATIONS PRESCRIBED      None    RECOMMENDATIONS     Intra-articular CSI of right elbow performed today in clinic  RTC in 1 month  Considering he does not have mechanical symptoms at this time, I recommended that we try an intra-articular corticosteroid injection both for diagnostic and therapeutic purposes.  10 minutes after the injection the patient had nearly 100% resolution of symptoms.  I will see him back in 1 month to see how he is doing after the injection.  If his symptoms do persist or he begins to have mechanical symptoms, I did advise him that a right elbow arthroscopic extensive debridement, loose body removal and synovectomy would be indicated.     Intermediate Joint Aspiration/Injection: R elbow    Date/Time: 9/14/2022 9:30 AM  Performed by: Alie Madison MD  Authorized by: Alie Madison MD     Consent Done?:  Yes (Verbal)  Indications:  Pain  Timeout: Prior to procedure the correct patient, procedure, and site was verified      Location:  Elbow  Site:  R elbow  Needle size:  22 G  Medications:  40 mg triamcinolone acetonide 40 mg/mL (Lidocaine HCL 10mg?mL (1%) injection 4mL)    All questions were answered, pt will contact us for questions or concerns in the interim.

## 2022-10-13 ENCOUNTER — PATIENT MESSAGE (OUTPATIENT)
Dept: UROLOGY | Facility: CLINIC | Age: 62
End: 2022-10-13
Payer: OTHER GOVERNMENT

## 2022-10-13 ENCOUNTER — PATIENT MESSAGE (OUTPATIENT)
Dept: FAMILY MEDICINE | Facility: CLINIC | Age: 62
End: 2022-10-13
Payer: OTHER GOVERNMENT

## 2022-10-14 ENCOUNTER — HOSPITAL ENCOUNTER (OUTPATIENT)
Dept: RADIOLOGY | Facility: HOSPITAL | Age: 62
Discharge: HOME | End: 2022-10-14
Attending: NURSE PRACTITIONER
Payer: OTHER GOVERNMENT

## 2022-10-14 DIAGNOSIS — N50.89 MASS OF LEFT TESTICLE: ICD-10-CM

## 2022-10-14 PROCEDURE — 76870 US EXAM SCROTUM: CPT | Mod: TC

## 2022-10-14 PROCEDURE — 76870 US EXAM SCROTUM: CPT | Mod: 26,,, | Performed by: RADIOLOGY

## 2022-10-14 PROCEDURE — 76870 US SCROTUM AND TESTICLES: ICD-10-PCS | Mod: 26,,, | Performed by: RADIOLOGY

## 2022-10-18 NOTE — PROGRESS NOTES
ESTABLISHED PATIENT    History 10/19/2022:  Danny returns to clinic today for follow-up of right elbow pain. He was given a CSI at last visit.  He states the injection helped for about a month but his symptoms have returned.  He wants to discuss further options.    History 9/14/2022:   62 y.o. Male with a history of right elbow pain who is referred by Dr. Cade Levine today. 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 He also enjoys riding motorcycles and dirt bikes. He states he first had pain while throwing several years ago. He has most pain on the lateral and the posterior elbow. He has pain with supination. He denies mechanical symptoms. He also has pain with terminal extension. He states he does not have to be moving the elbow to have pain. He has not done anything to treat the symptoms.    Is affecting ADLs.  Pain is 6/10 at it's worst.    REVIEW OF SYSTEMS   Constitution: Negative. Negative for chills, fever and night sweats.   HENT: Negative for congestion and headaches.    Eyes: Negative for blurred vision, left vision loss and right vision loss.   Cardiovascular: Negative for chest pain and syncope.   Respiratory: Negative for cough and shortness of breath.    Endocrine: Negative for polydipsia, polyphagia and polyuria.   Hematologic/Lymphatic: Negative for bleeding problem. Does not bruise/bleed easily.   Skin: Negative for dry skin, itching and rash.   Musculoskeletal: Negative for falls. Positive for right elbow pain  Gastrointestinal: Negative for abdominal pain and bowel incontinence.   Genitourinary: Negative for bladder incontinence and nocturia.   Neurological: Negative for disturbances in coordination, loss of balance and seizures.   Psychiatric/Behavioral: Negative for depression. The patient does not have insomnia.    Allergic/Immunologic: Negative for hives and persistent infections.     PHYSICAL EXAMINATION    Vitals: There were no vitals taken  for this visit.    General: The patient appears active and healthy with no apparent physical problems.  No disturbance of mood or affect is demonstrated. Alert and Oriented.    HEENT: Eyes normal, pupils equally round, nose normal.    Resp: Equal and symmetrical chest rises. No wheezing    CV: Regular rate    Neck: Supple; nonpainful range of motion.    Extremities: no cyanosis, clubbing, edema, or diffuse swelling.  Palpable pulses, good capillary refill of the digits.  No coolness, discoloration, edema or obvious varicosities.    Skin: no lesions noted.    Lymphatic: no detected adenopathy in the upper or lower extremities.    Neurologic: normal mental status, normal reflexes, normal gait and balance.  Patient is alert and oriented to person, place and time.  No flaccidity or spasticity is noted.  No motor or sensory deficits are noted.  Light touch is intact    Orthopaedic:     Elbow Exam-RIGHT    Inspection: Normal skin color and appearance, no ecchymosis, no swelling, no scars.  There is a normal carrying angle.      ROM: 10° - 135+° with 80° supination and 80° pronation.       Palpation: No tenderness at the medial epicondyle,  radial head, or lateral epicondyle. Mild tenderness lateral epicondyle, olecranon     The wrist flexor-pronator muscle group is nontender.    The wrist extensor mobile wad is nontender.    Strength: Flexor and extensor motor strength is 5/5.      Stability: Varus and valgus stress reveals no instability. No Guarding    Neuro Reflexes are 2/2 biceps, brachioradialis and triceps. Sensation intact    Test: - Milking maneuver - VEO - Thinker's - Tinel's Sign - Ulnar nerve subluxation - Hook Test - Pain with resisted wrist ext - Pain with long finger ext      IMAGING    Narrative & Impression  EXAMINATION:  MRI ELBOW WITHOUT CONTRAST RIGHT     CLINICAL HISTORY:  right elbow extensor tendon tear;  Synovitis and tenosynovitis, unspecified     TECHNIQUE:  Multiplanar multi sequence MR imaging  of the right elbow without the use of contrast.     COMPARISON:  None     FINDINGS:  Exam limited by motion and patient oblique positioning, despite multiple repeat attempts.     Bones: No acute fracture, avascular necrosis or marrow replacement.  Marginal spurring at multiple sites.     Joint: There is no malalignment.  There is a joint effusion and there is a rounded hypointense signal focus along the olecranon fossa 4 mm series 5, image 13.  Another such focus measuring 8 mm can be seen axial series 7, image 17.   there are focal areas of full-thickness chondral loss with subjacent marrow edema along the capitellum.     Ulnar Collateral Ligament: Nondiagnostic assessment due to motion and oblique positioning.     Common Flexor Tendon: Intact.     Lateral ligaments: Radial collateral and lateral ulnar collateral ligaments are intact.     Common Extensor Tendon: Fluid cleft along the undersurface of the common extensor tendon, anterior half.     Biceps Tendon: Intact.     Triceps Tendon: Intact.     Soft Tissues: No mass or focal fluid. Normal muscle volume and signal.     Impression:     Exam is of diminished diagnostic quality.     1. Partial-thickness tear at the common extensor tendon.  2. Elbow degenerative change including focal areas of grade 4 chondrosis along the capitellum.  3. Small joint effusion with several intra-articular bodies.        Electronically signed by: Michi Reed  Date:                                            08/24/2022  Time:                                           18:19    IMPRESSION       ICD-10-CM ICD-9-CM   1. ECRB (extensor carpi radialis brevis) tenosynovitis  M65.9 727.05   2. Arthritis of right elbow  M19.021 716.92         MEDICATIONS PRESCRIBED      None    RECOMMENDATIONS     CT scan of the right elbow for surgical planning  Return to clinic for a preoperative appointment  We discussed a right elbow arthroscopic extensive debridement, loose body removal and  synovectomy    Procedures    All questions were answered, pt will contact us for questions or concerns in the interim.

## 2022-10-19 ENCOUNTER — OFFICE VISIT (OUTPATIENT)
Dept: SPORTS MEDICINE | Facility: CLINIC | Age: 62
End: 2022-10-19
Payer: OTHER GOVERNMENT

## 2022-10-19 ENCOUNTER — PATIENT MESSAGE (OUTPATIENT)
Dept: SPORTS MEDICINE | Facility: CLINIC | Age: 62
End: 2022-10-19

## 2022-10-19 VITALS
BODY MASS INDEX: 29.48 KG/M2 | DIASTOLIC BLOOD PRESSURE: 65 MMHG | WEIGHT: 199.06 LBS | HEART RATE: 64 BPM | SYSTOLIC BLOOD PRESSURE: 112 MMHG | HEIGHT: 69 IN

## 2022-10-19 DIAGNOSIS — M65.9 ECRB (EXTENSOR CARPI RADIALIS BREVIS) TENOSYNOVITIS: Primary | ICD-10-CM

## 2022-10-19 DIAGNOSIS — M25.521 RIGHT ELBOW PAIN: ICD-10-CM

## 2022-10-19 DIAGNOSIS — M19.021 ARTHRITIS OF RIGHT ELBOW: ICD-10-CM

## 2022-10-19 PROCEDURE — 99214 PR OFFICE/OUTPT VISIT, EST, LEVL IV, 30-39 MIN: ICD-10-PCS | Mod: S$PBB,,, | Performed by: ORTHOPAEDIC SURGERY

## 2022-10-19 PROCEDURE — 99213 OFFICE O/P EST LOW 20 MIN: CPT | Mod: PBBFAC | Performed by: ORTHOPAEDIC SURGERY

## 2022-10-19 PROCEDURE — 99999 PR PBB SHADOW E&M-EST. PATIENT-LVL III: ICD-10-PCS | Mod: PBBFAC,,, | Performed by: ORTHOPAEDIC SURGERY

## 2022-10-19 PROCEDURE — 99999 PR PBB SHADOW E&M-EST. PATIENT-LVL III: CPT | Mod: PBBFAC,,, | Performed by: ORTHOPAEDIC SURGERY

## 2022-10-19 PROCEDURE — 99214 OFFICE O/P EST MOD 30 MIN: CPT | Mod: S$PBB,,, | Performed by: ORTHOPAEDIC SURGERY

## 2022-10-28 ENCOUNTER — PATIENT MESSAGE (OUTPATIENT)
Dept: SPORTS MEDICINE | Facility: CLINIC | Age: 62
End: 2022-10-28
Payer: OTHER GOVERNMENT

## 2022-10-31 ENCOUNTER — TELEPHONE (OUTPATIENT)
Dept: FAMILY MEDICINE | Facility: CLINIC | Age: 62
End: 2022-10-31
Payer: OTHER GOVERNMENT

## 2022-10-31 NOTE — TELEPHONE ENCOUNTER
Spoke to patient, scheduled pre op appointment next Monday 11/7 with RASHEED Bermudez. Patient verbalized understanding of appointment.

## 2022-10-31 NOTE — TELEPHONE ENCOUNTER
----- Message from Domi Connolly sent at 10/31/2022 10:15 AM CDT -----  Good morning,    Dr. Madison is recommending surgery for your mutual patient. They will undergo right elbow arthroscopy under general anesthesia. Prior to surgery they will need to have the following completed through your office or what you see fit:    -EKG  -Blood work  -Letter stating medically cleared for surgery    Please feel free to contact myself or Dr. Madison with questions.    Thank you-    Domi Connolly, MS, OTC  Clinical Assistant to Alie Madison MD  [facility]

## 2022-11-02 ENCOUNTER — HOSPITAL ENCOUNTER (OUTPATIENT)
Dept: RADIOLOGY | Facility: HOSPITAL | Age: 62
Discharge: HOME | End: 2022-11-02
Attending: ORTHOPAEDIC SURGERY
Payer: OTHER GOVERNMENT

## 2022-11-02 DIAGNOSIS — M25.521 RIGHT ELBOW PAIN: ICD-10-CM

## 2022-11-02 DIAGNOSIS — M19.021 ARTHRITIS OF RIGHT ELBOW: ICD-10-CM

## 2022-11-02 DIAGNOSIS — M65.9 ECRB (EXTENSOR CARPI RADIALIS BREVIS) TENOSYNOVITIS: ICD-10-CM

## 2022-11-02 PROCEDURE — 73200 CT UPPER EXTREMITY W/O DYE: CPT | Mod: 26,RT,, | Performed by: RADIOLOGY

## 2022-11-02 PROCEDURE — 73200 CT UPPER EXTREMITY W/O DYE: CPT | Mod: TC,RT

## 2022-11-02 PROCEDURE — 73200 CT ARM ELBOW WITHOUT CONTRAST RIGHT: ICD-10-PCS | Mod: 26,RT,, | Performed by: RADIOLOGY

## 2022-11-04 ENCOUNTER — TELEPHONE (OUTPATIENT)
Dept: SPORTS MEDICINE | Facility: CLINIC | Age: 62
End: 2022-11-04
Payer: OTHER GOVERNMENT

## 2022-11-04 DIAGNOSIS — M19.021 ARTHRITIS OF RIGHT ELBOW: Primary | ICD-10-CM

## 2022-11-04 DIAGNOSIS — M24.021 LOOSE BODY(IES), JOINT, ELBOW, RIGHT: ICD-10-CM

## 2022-11-07 ENCOUNTER — OFFICE VISIT (OUTPATIENT)
Dept: FAMILY MEDICINE | Facility: CLINIC | Age: 62
End: 2022-11-07
Payer: OTHER GOVERNMENT

## 2022-11-07 VITALS
TEMPERATURE: 99 F | HEIGHT: 69 IN | WEIGHT: 196.19 LBS | OXYGEN SATURATION: 93 % | DIASTOLIC BLOOD PRESSURE: 72 MMHG | SYSTOLIC BLOOD PRESSURE: 110 MMHG | BODY MASS INDEX: 29.06 KG/M2 | HEART RATE: 75 BPM

## 2022-11-07 DIAGNOSIS — Z01.818 PRE-OPERATIVE CLEARANCE: Primary | ICD-10-CM

## 2022-11-07 PROCEDURE — 93005 ELECTROCARDIOGRAM TRACING: CPT | Mod: PBBFAC,PO | Performed by: INTERNAL MEDICINE

## 2022-11-07 PROCEDURE — 99214 PR OFFICE/OUTPT VISIT, EST, LEVL IV, 30-39 MIN: ICD-10-PCS | Mod: S$PBB,,, | Performed by: PHYSICIAN ASSISTANT

## 2022-11-07 PROCEDURE — 93010 EKG 12-LEAD: ICD-10-PCS | Mod: S$PBB,,, | Performed by: INTERNAL MEDICINE

## 2022-11-07 PROCEDURE — 99999 PR PBB SHADOW E&M-EST. PATIENT-LVL III: CPT | Mod: PBBFAC,,, | Performed by: PHYSICIAN ASSISTANT

## 2022-11-07 PROCEDURE — 99213 OFFICE O/P EST LOW 20 MIN: CPT | Mod: PBBFAC,PO | Performed by: PHYSICIAN ASSISTANT

## 2022-11-07 PROCEDURE — 99999 PR PBB SHADOW E&M-EST. PATIENT-LVL III: ICD-10-PCS | Mod: PBBFAC,,, | Performed by: PHYSICIAN ASSISTANT

## 2022-11-07 PROCEDURE — 99214 OFFICE O/P EST MOD 30 MIN: CPT | Mod: S$PBB,,, | Performed by: PHYSICIAN ASSISTANT

## 2022-11-07 PROCEDURE — 93010 ELECTROCARDIOGRAM REPORT: CPT | Mod: S$PBB,,, | Performed by: INTERNAL MEDICINE

## 2022-11-07 NOTE — PROGRESS NOTES
Subjective:       Patient ID: Danny Chatman is a 62 y.o. male.    Chief Complaint: Pre-op Exam    HPI  Pre op clearance  Pt. To have scope of R elbow in next 30 days   Scheduled for 11-17-22  Dr Madison will perform the surgery  Pt. Feels well  Review of Systems   Constitutional: Negative.  Negative for activity change, appetite change, chills, diaphoresis, fatigue, fever and unexpected weight change.   HENT: Negative.     Eyes: Negative.    Respiratory: Negative.  Negative for cough and shortness of breath.    Cardiovascular: Negative.  Negative for chest pain and leg swelling.   Gastrointestinal: Negative.    Endocrine: Negative.    Genitourinary: Negative.    Musculoskeletal:  Positive for arthralgias.   Integumentary:  Negative for rash. Negative.   Neurological: Negative.        Objective:      Physical Exam  Vitals reviewed.   Constitutional:       General: He is not in acute distress.     Appearance: Normal appearance. He is normal weight. He is not ill-appearing, toxic-appearing or diaphoretic.   HENT:      Head: Normocephalic and atraumatic.      Right Ear: Tympanic membrane, ear canal and external ear normal. There is no impacted cerumen.      Left Ear: Tympanic membrane, ear canal and external ear normal. There is no impacted cerumen.      Nose: Nose normal.      Mouth/Throat:      Mouth: Mucous membranes are moist.      Pharynx: Oropharynx is clear. No oropharyngeal exudate or posterior oropharyngeal erythema.   Eyes:      General: No scleral icterus.     Conjunctiva/sclera: Conjunctivae normal.   Neck:      Vascular: No carotid bruit.   Cardiovascular:      Rate and Rhythm: Normal rate and regular rhythm.      Pulses: Normal pulses.      Heart sounds: Normal heart sounds. No murmur heard.    No friction rub. No gallop.   Pulmonary:      Effort: Pulmonary effort is normal. No respiratory distress.      Breath sounds: Normal breath sounds. No stridor. No wheezing, rhonchi or rales.   Abdominal:       General: Abdomen is flat. Bowel sounds are normal. There is no distension.      Palpations: Abdomen is soft. There is no mass.      Tenderness: There is no abdominal tenderness. There is no guarding or rebound.      Hernia: No hernia is present.   Musculoskeletal:         General: No swelling.      Cervical back: Normal range of motion and neck supple. No rigidity or tenderness.      Right lower leg: No edema.      Left lower leg: No edema.   Lymphadenopathy:      Cervical: No cervical adenopathy.   Skin:     General: Skin is warm and dry.      Findings: No rash.   Neurological:      General: No focal deficit present.      Mental Status: He is alert and oriented to person, place, and time.       Assessment:       Problem List Items Addressed This Visit    None  Visit Diagnoses       Pre-operative clearance    -  Primary    Relevant Orders    EKG 12-lead            Plan:       Danny was seen today for pre-op exam.    Diagnoses and all orders for this visit:    Pre-operative clearance  -     X-Ray Chest PA And Lateral; Future  -     EKG 12-lead; Future  -     CBC Auto Differential; Future  -     Comprehensive Metabolic Panel; Future  -     Urinalysis; Future       Lab stable  EKG stable  Chest xray stable    Pt. Is cleared for surgery and anesthesia  Dr. Madison notified

## 2022-11-08 ENCOUNTER — HOSPITAL ENCOUNTER (OUTPATIENT)
Dept: RADIOLOGY | Facility: HOSPITAL | Age: 62
Discharge: HOME | End: 2022-11-08
Attending: PHYSICIAN ASSISTANT
Payer: OTHER GOVERNMENT

## 2022-11-08 ENCOUNTER — PATIENT MESSAGE (OUTPATIENT)
Dept: PREADMISSION TESTING | Facility: HOSPITAL | Age: 62
End: 2022-11-08
Payer: OTHER GOVERNMENT

## 2022-11-08 DIAGNOSIS — Z01.818 PRE-OPERATIVE CLEARANCE: ICD-10-CM

## 2022-11-08 PROCEDURE — 71046 X-RAY EXAM CHEST 2 VIEWS: CPT | Mod: 26,,, | Performed by: RADIOLOGY

## 2022-11-08 PROCEDURE — 71046 X-RAY EXAM CHEST 2 VIEWS: CPT | Mod: TC,FY

## 2022-11-08 PROCEDURE — 71046 XR CHEST PA AND LATERAL: ICD-10-PCS | Mod: 26,,, | Performed by: RADIOLOGY

## 2022-11-08 NOTE — ANESTHESIA PAT ROS NOTE
11/08/2022  Danny Chatman is a 62 y.o., male.      Pre-op Assessment    I have reviewed the Patient Summary Reports.       I have reviewed the Medications.     Review of Systems  Anesthesia Hx:  No problems with previous Anesthesia   History of prior surgery of interest to airway management or planning:  Previous anesthesia: MAC       12/6/2016  Colonsocopy with MAC.  Procedure performed at an [facility].    Denies Personal Hx of Anesthesia complications.                    Social:  Non-Smoker, No Alcohol Use       EENT/Dental:   Cataract, Choroidal Nevus of left eye          Cardiovascular:  Cardiovascular Normal Exercise tolerance: good                 ECG has been reviewed.                          Renal/:     Vasectomy             Hepatic/GI:        History of colon polyp          Musculoskeletal:  Arthritis   Arthritis right elbow, tenosynovitis,  S/P ACL Repair right Knee,  Right Index Finger ORIF            Endocrine:  Denies Diabetes.                  Anesthesia Assessment: Preoperative EQUATION      Planned Procedure: Procedure(s) (LRB):  ARTHROSCOPY, ELBOW-Loose body removal/debridement (Right)  SYNOVECTOMY, ELBOW (Right)  Requested Anesthesia Type:General  Surgeon: Alie Madison MD  Service: Orthopedics  Known or anticipated Date of Surgery:11/17/2022    Surgeon notes: reviewed          Triage considerations:     The patient has no apparent active cardiac condition (No unstable coronary Syndrome such as severe unstable angina or recent [<1 month] myocardial infarction, decompensated CHF, severe valvular   disease or significant arrhythmia)    Previous anesthesia records:MAC and No problems    Last PCP note: within 1 month , within [facility]   Seen by PCP for surgical clearance; patient is cleared for surgery.     EKG:    Vent. Rate : 062 BPM     Atrial Rate : 062 BPM      P-R Int : 190  ms          QRS Dur : 098 ms       QT Int : 392 ms       P-R-T Axes : 049 076 042 degrees      QTc Int : 397 ms   Normal sinus rhythm   Incomplete right bundle branch block   Borderline Abnormal ECG   No previous ECGs available   Confirmed by Bryce Hoang MD ([id number]) on 11/11/2022 12:54:11 PM   Referred By: SYED SAUCEDO           Confirmed By:Bryce Hoang MD              Instructions given. (See in Nurse's note)    Ht:  5'9  Wt: 196 lb  BMI: 28.98

## 2022-11-09 ENCOUNTER — OFFICE VISIT (OUTPATIENT)
Dept: SPORTS MEDICINE | Facility: CLINIC | Age: 62
End: 2022-11-09
Payer: OTHER GOVERNMENT

## 2022-11-09 VITALS
DIASTOLIC BLOOD PRESSURE: 65 MMHG | HEART RATE: 73 BPM | HEIGHT: 69 IN | BODY MASS INDEX: 29.03 KG/M2 | SYSTOLIC BLOOD PRESSURE: 106 MMHG | WEIGHT: 196 LBS

## 2022-11-09 DIAGNOSIS — M24.021 LOOSE BODY(IES), JOINT, ELBOW, RIGHT: ICD-10-CM

## 2022-11-09 DIAGNOSIS — M19.021 ARTHRITIS OF RIGHT ELBOW: Primary | ICD-10-CM

## 2022-11-09 PROCEDURE — 99999 PR PBB SHADOW E&M-EST. PATIENT-LVL III: ICD-10-PCS | Mod: PBBFAC,,, | Performed by: ORTHOPAEDIC SURGERY

## 2022-11-09 PROCEDURE — 99215 OFFICE O/P EST HI 40 MIN: CPT | Mod: S$PBB,,, | Performed by: ORTHOPAEDIC SURGERY

## 2022-11-09 PROCEDURE — 99215 PR OFFICE/OUTPT VISIT, EST, LEVL V, 40-54 MIN: ICD-10-PCS | Mod: S$PBB,,, | Performed by: ORTHOPAEDIC SURGERY

## 2022-11-09 PROCEDURE — 99213 OFFICE O/P EST LOW 20 MIN: CPT | Mod: PBBFAC | Performed by: ORTHOPAEDIC SURGERY

## 2022-11-09 PROCEDURE — 99999 PR PBB SHADOW E&M-EST. PATIENT-LVL III: CPT | Mod: PBBFAC,,, | Performed by: ORTHOPAEDIC SURGERY

## 2022-11-11 RX ORDER — MUPIROCIN 20 MG/G
OINTMENT TOPICAL
Status: CANCELLED | OUTPATIENT
Start: 2022-11-11

## 2022-11-11 RX ORDER — ESOMEPRAZOLE MAGNESIUM 40 MG/1
40 CAPSULE, DELAYED RELEASE ORAL
Qty: 7 CAPSULE | Refills: 0 | Status: SHIPPED | OUTPATIENT
Start: 2022-11-11 | End: 2023-08-11

## 2022-11-11 RX ORDER — HYDROCODONE BITARTRATE AND ACETAMINOPHEN 7.5; 325 MG/1; MG/1
1 TABLET ORAL EVERY 4 HOURS PRN
Qty: 20 TABLET | Refills: 0 | Status: SHIPPED | OUTPATIENT
Start: 2022-11-11 | End: 2023-08-11

## 2022-11-11 RX ORDER — INDOMETHACIN 75 MG/1
75 CAPSULE, EXTENDED RELEASE ORAL 2 TIMES DAILY WITH MEALS
Qty: 14 CAPSULE | Refills: 0 | Status: SHIPPED | OUTPATIENT
Start: 2022-11-11 | End: 2022-11-24

## 2022-11-11 NOTE — H&P
ESTABLISHED PATIENT    History 11/9/2022:  Cornelius returns to clinic today to complete perioperative paperwork and to discuss CT results of his right elbow. He does not report any changes to his condition since last visit.  He wants to proceed with surgery.    History 10/19/2022:  Danny returns to clinic today for follow-up of right elbow pain. He was given a CSI at last visit.  He states the injection helped for about a month but his symptoms have returned.  He wants to discuss further options.    History 9/14/2022:   62 y.o. Male with a history of right elbow pain who is referred by Dr. Cade Levine today. He just retired from working on the [employer]. 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 He also enjoys riding motorcycles and dirt bikes. He states he first had pain while throwing several years ago. He has most pain on the lateral and the posterior elbow. He has pain with supination. He denies mechanical symptoms. He also has pain with terminal extension. He states he does not have to be moving the elbow to have pain. He has not done anything to treat the symptoms.    Is affecting ADLs.  Pain is 6/10 at it's worst.        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    Colon polyp     Hyperlipidemia        PAST SURGICAL HISTORY     Past Surgical History:   Procedure Laterality Date    ANTERIOR CRUCIATE LIGAMENT REPAIR Right 1999    COLONOSCOPY N/A 12/6/2016    Procedure: COLONOSCOPY;  Surgeon: Lucho Vera MD;  Location: [facility];  Service: Endoscopy;  Laterality: N/A;    FINGER FRACTURE SURGERY      R index    VASECTOMY  1989       FAMILY HISTORY    Family History   Problem Relation Age of Onset    Skin cancer Mother     Cerebral aneurysm Mother     No Known Problems Maternal Grandmother     No Known Problems Maternal Grandfather     No Known Problems Paternal Grandmother     No Known Problems Paternal Grandfather     Amblyopia Neg  Hx     Blindness Neg Hx     Cancer Neg Hx     Cataracts Neg Hx     Glaucoma Neg Hx     Diabetes Neg Hx     Hypertension Neg Hx     Macular degeneration Neg Hx     Retinal detachment Neg Hx     Strabismus Neg Hx     Stroke Neg Hx     Thyroid disease Neg Hx     Melanoma Neg Hx     Psoriasis Neg Hx     Lupus Neg Hx     Eczema Neg Hx        SOCIAL HISTORY    Social History     Socioeconomic History    Marital status:    Tobacco Use    Smoking status: Never    Smokeless tobacco: Never   Substance and Sexual Activity    Alcohol use: Yes     Alcohol/week: 0.0 standard drinks     Comment: rare    Drug use: No    Sexual activity: Yes     Partners: Female     Social Determinants of Health     Financial Resource Strain: Low Risk     Difficulty of Paying Living Expenses: Not hard at all   Food Insecurity: No Food Insecurity    Worried About Running Out of Food in the Last Year: Never true    Ran Out of Food in the Last Year: Never true   Transportation Needs: No Transportation Needs    Lack of Transportation (Medical): No    Lack of Transportation (Non-Medical): No   Physical Activity: Sufficiently Active    Days of Exercise per Week: 5 days    Minutes of Exercise per Session: 30 min   Stress: No Stress Concern Present    Feeling of Stress : Not at all   Social Connections: Unknown    Frequency of Communication with Friends and Family: More than three times a week    Frequency of Social Gatherings with Friends and Family: More than three times a week    Active Member of Clubs or Organizations: No    Attends Club or Organization Meetings: Never    Marital Status:    Housing Stability: Low Risk     Unable to Pay for Housing in the Last Year: No    Number of Places Lived in the Last Year: 1    Unstable Housing in the Last Year: No       MEDICATIONS      Current Outpatient Medications:     MULTIVIT-MIN/FA/LYCOPEN/LUTEIN (CENTRUM SILVER ULTRA MEN'S ORAL), Take 1 tablet by mouth once daily., Disp: , Rfl:     ALLERGIES  "    Review of patient's allergies indicates:  No Known Allergies      REVIEW OF SYSTEMS   Constitution: Negative. Negative for chills, fever and night sweats.   HENT: Negative for congestion and headaches.    Eyes: Negative for blurred vision, left vision loss and right vision loss.   Cardiovascular: Negative for chest pain and syncope.   Respiratory: Negative for cough and shortness of breath.    Endocrine: Negative for polydipsia, polyphagia and polyuria.   Hematologic/Lymphatic: Negative for bleeding problem. Does not bruise/bleed easily.   Skin: Negative for dry skin, itching and rash.   Musculoskeletal: Negative for falls. Positive for right elbow pain  Gastrointestinal: Negative for abdominal pain and bowel incontinence.   Genitourinary: Negative for bladder incontinence and nocturia.   Neurological: Negative for disturbances in coordination, loss of balance and seizures.   Psychiatric/Behavioral: Negative for depression. The patient does not have insomnia.    Allergic/Immunologic: Negative for hives and persistent infections.     PHYSICAL EXAMINATION    Vitals: /65   Pulse 73   Ht 5' 9" (1.753 m)   Wt 88.9 kg (196 lb)   BMI 28.94 kg/m²     General: The patient appears active and healthy with no apparent physical problems.  No disturbance of mood or affect is demonstrated. Alert and Oriented.    HEENT: Eyes normal, pupils equally round, nose normal.    Resp: Equal and symmetrical chest rises. No wheezing    CV: Regular rate    Neck: Supple; nonpainful range of motion.    Extremities: no cyanosis, clubbing, edema, or diffuse swelling.  Palpable pulses, good capillary refill of the digits.  No coolness, discoloration, edema or obvious varicosities.    Skin: no lesions noted.    Lymphatic: no detected adenopathy in the upper or lower extremities.    Neurologic: normal mental status, normal reflexes, normal gait and balance.  Patient is alert and oriented to person, place and time.  No flaccidity or " spasticity is noted.  No motor or sensory deficits are noted.  Light touch is intact    Orthopaedic:     Elbow Exam-RIGHT    Inspection: Normal skin color and appearance, no ecchymosis, no swelling, no scars.  There is a normal carrying angle.      ROM: 10° - 135+° with 80° supination and 80° pronation.       Palpation: No tenderness at the medial epicondyle,  radial head, or lateral epicondyle. Mild tenderness lateral epicondyle, olecranon     The wrist flexor-pronator muscle group is nontender.    The wrist extensor mobile wad is nontender.    Strength: Flexor and extensor motor strength is 5/5.      Stability: Varus and valgus stress reveals no instability. No Guarding    Neuro Reflexes are 2/2 biceps, brachioradialis and triceps. Sensation intact    Test: - Milking maneuver - VEO - Thinker's - Tinel's Sign - Ulnar nerve subluxation - Hook Test - Pain with resisted wrist ext - Pain with long finger ext      IMAGING    Narrative & Impression  EXAMINATION:  CT ARM ELBOW WITHOUT CONTRAST RIGHT     CLINICAL HISTORY:  Elbow pain, chronic, osteoarthritis suspected;Preoperative surgical planning for an elbow arthroscopy of an arthritic elbow;  Synovitis and tenosynovitis, unspecified     TECHNIQUE:  Axial scans of the right elbow were obtained without contrast and multiplanar reformatted images obtained     COMPARISON:  CT none.  MRI 08/24/2022 please refer to that report     FINDINGS:  There are degenerative changes and multiple ossifications corresponding to the intra-articular loose bodies described on the MRI.  5 mm ossification medially at the joint medial to the distal humerus and proximal ulna for example series 300, image 45.  12 mm ossification posterior to the radial head for example series 300, image 42, series 302, image 52.  Multiple ossifications including six and 5 mm fragments posterior to the distal humerus medially along with bony eburnation for example series 3, image 321, series 302 images 36 through  31.  Small ossifications lateral to the lateral humeral epicondyle.  Olecranon process spur.  Spurring of the coronoid process of the ulna and medial as well as mediolateral humeral epicondyles and radial head..  No acute fractures seen.     Impression:     Degenerative changes multiple loose bodies in the joint        Electronically signed by: Bethany Estrada MD  Date:                                            11/02/2022  Time:                                           12:11    I reviewed a CT scan of his right elbow which demonstrates multiple loose bodies and degenerative changes noted of the right elbow.    IMPRESSION       ICD-10-CM ICD-9-CM   1. Arthritis of right elbow  M19.021 716.92   2. Loose body(ies), joint, elbow, right  M24.021 718.12         MEDICATIONS PRESCRIBED      None    RECOMMENDATIONS     The patient elected to proceed with operative intervention after all risks, benefits, and alternatives were discussed with the patient.  The risks of surgery include bleeding, scarring, injuries to nerves, arteries and veins, need for additional surgeries, blood clots, infections, and the risk of anesthesia.  I personally obtained informed consent from the patient and documented full history and physical, which has been placed on the chart.  Right elbow arthroscopy with extensive debridement, synovectomy, and loose body removal  Referral to physical therapy placed today    All questions were answered, pt will contact us for questions or concerns in the interim.

## 2022-11-11 NOTE — H&P (VIEW-ONLY)
ESTABLISHED PATIENT    History 11/9/2022:  Cornelius returns to clinic today to complete perioperative paperwork and to discuss CT results of his right elbow. He does not report any changes to his condition since last visit.  He wants to proceed with surgery.    History 10/19/2022:  Danny returns to clinic today for follow-up of right elbow pain. He was given a CSI at last visit.  He states the injection helped for about a month but his symptoms have returned.  He wants to discuss further options.    History 9/14/2022:   62 y.o. Male with a history of right elbow pain who is referred by Dr. Cade Levine today. He just retired from working on the [employer]. 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 He also enjoys riding motorcycles and dirt bikes. He states he first had pain while throwing several years ago. He has most pain on the lateral and the posterior elbow. He has pain with supination. He denies mechanical symptoms. He also has pain with terminal extension. He states he does not have to be moving the elbow to have pain. He has not done anything to treat the symptoms.    Is affecting ADLs.  Pain is 6/10 at it's worst.        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    Colon polyp     Hyperlipidemia        PAST SURGICAL HISTORY     Past Surgical History:   Procedure Laterality Date    ANTERIOR CRUCIATE LIGAMENT REPAIR Right 1999    COLONOSCOPY N/A 12/6/2016    Procedure: COLONOSCOPY;  Surgeon: Lucho Vera MD;  Location: [facility];  Service: Endoscopy;  Laterality: N/A;    FINGER FRACTURE SURGERY      R index    VASECTOMY  1989       FAMILY HISTORY    Family History   Problem Relation Age of Onset    Skin cancer Mother     Cerebral aneurysm Mother     No Known Problems Maternal Grandmother     No Known Problems Maternal Grandfather     No Known Problems Paternal Grandmother     No Known Problems Paternal Grandfather     Amblyopia Neg  Hx     Blindness Neg Hx     Cancer Neg Hx     Cataracts Neg Hx     Glaucoma Neg Hx     Diabetes Neg Hx     Hypertension Neg Hx     Macular degeneration Neg Hx     Retinal detachment Neg Hx     Strabismus Neg Hx     Stroke Neg Hx     Thyroid disease Neg Hx     Melanoma Neg Hx     Psoriasis Neg Hx     Lupus Neg Hx     Eczema Neg Hx        SOCIAL HISTORY    Social History     Socioeconomic History    Marital status:    Tobacco Use    Smoking status: Never    Smokeless tobacco: Never   Substance and Sexual Activity    Alcohol use: Yes     Alcohol/week: 0.0 standard drinks     Comment: rare    Drug use: No    Sexual activity: Yes     Partners: Female     Social Determinants of Health     Financial Resource Strain: Low Risk     Difficulty of Paying Living Expenses: Not hard at all   Food Insecurity: No Food Insecurity    Worried About Running Out of Food in the Last Year: Never true    Ran Out of Food in the Last Year: Never true   Transportation Needs: No Transportation Needs    Lack of Transportation (Medical): No    Lack of Transportation (Non-Medical): No   Physical Activity: Sufficiently Active    Days of Exercise per Week: 5 days    Minutes of Exercise per Session: 30 min   Stress: No Stress Concern Present    Feeling of Stress : Not at all   Social Connections: Unknown    Frequency of Communication with Friends and Family: More than three times a week    Frequency of Social Gatherings with Friends and Family: More than three times a week    Active Member of Clubs or Organizations: No    Attends Club or Organization Meetings: Never    Marital Status:    Housing Stability: Low Risk     Unable to Pay for Housing in the Last Year: No    Number of Places Lived in the Last Year: 1    Unstable Housing in the Last Year: No       MEDICATIONS      Current Outpatient Medications:     MULTIVIT-MIN/FA/LYCOPEN/LUTEIN (CENTRUM SILVER ULTRA MEN'S ORAL), Take 1 tablet by mouth once daily., Disp: , Rfl:     ALLERGIES  "    Review of patient's allergies indicates:  No Known Allergies      REVIEW OF SYSTEMS   Constitution: Negative. Negative for chills, fever and night sweats.   HENT: Negative for congestion and headaches.    Eyes: Negative for blurred vision, left vision loss and right vision loss.   Cardiovascular: Negative for chest pain and syncope.   Respiratory: Negative for cough and shortness of breath.    Endocrine: Negative for polydipsia, polyphagia and polyuria.   Hematologic/Lymphatic: Negative for bleeding problem. Does not bruise/bleed easily.   Skin: Negative for dry skin, itching and rash.   Musculoskeletal: Negative for falls. Positive for right elbow pain  Gastrointestinal: Negative for abdominal pain and bowel incontinence.   Genitourinary: Negative for bladder incontinence and nocturia.   Neurological: Negative for disturbances in coordination, loss of balance and seizures.   Psychiatric/Behavioral: Negative for depression. The patient does not have insomnia.    Allergic/Immunologic: Negative for hives and persistent infections.     PHYSICAL EXAMINATION    Vitals: /65   Pulse 73   Ht 5' 9" (1.753 m)   Wt 88.9 kg (196 lb)   BMI 28.94 kg/m²     General: The patient appears active and healthy with no apparent physical problems.  No disturbance of mood or affect is demonstrated. Alert and Oriented.    HEENT: Eyes normal, pupils equally round, nose normal.    Resp: Equal and symmetrical chest rises. No wheezing    CV: Regular rate    Neck: Supple; nonpainful range of motion.    Extremities: no cyanosis, clubbing, edema, or diffuse swelling.  Palpable pulses, good capillary refill of the digits.  No coolness, discoloration, edema or obvious varicosities.    Skin: no lesions noted.    Lymphatic: no detected adenopathy in the upper or lower extremities.    Neurologic: normal mental status, normal reflexes, normal gait and balance.  Patient is alert and oriented to person, place and time.  No flaccidity or " spasticity is noted.  No motor or sensory deficits are noted.  Light touch is intact    Orthopaedic:     Elbow Exam-RIGHT    Inspection: Normal skin color and appearance, no ecchymosis, no swelling, no scars.  There is a normal carrying angle.      ROM: 10° - 135+° with 80° supination and 80° pronation.       Palpation: No tenderness at the medial epicondyle,  radial head, or lateral epicondyle. Mild tenderness lateral epicondyle, olecranon     The wrist flexor-pronator muscle group is nontender.    The wrist extensor mobile wad is nontender.    Strength: Flexor and extensor motor strength is 5/5.      Stability: Varus and valgus stress reveals no instability. No Guarding    Neuro Reflexes are 2/2 biceps, brachioradialis and triceps. Sensation intact    Test: - Milking maneuver - VEO - Thinker's - Tinel's Sign - Ulnar nerve subluxation - Hook Test - Pain with resisted wrist ext - Pain with long finger ext      IMAGING    Narrative & Impression  EXAMINATION:  CT ARM ELBOW WITHOUT CONTRAST RIGHT     CLINICAL HISTORY:  Elbow pain, chronic, osteoarthritis suspected;Preoperative surgical planning for an elbow arthroscopy of an arthritic elbow;  Synovitis and tenosynovitis, unspecified     TECHNIQUE:  Axial scans of the right elbow were obtained without contrast and multiplanar reformatted images obtained     COMPARISON:  CT none.  MRI 08/24/2022 please refer to that report     FINDINGS:  There are degenerative changes and multiple ossifications corresponding to the intra-articular loose bodies described on the MRI.  5 mm ossification medially at the joint medial to the distal humerus and proximal ulna for example series 300, image 45.  12 mm ossification posterior to the radial head for example series 300, image 42, series 302, image 52.  Multiple ossifications including six and 5 mm fragments posterior to the distal humerus medially along with bony eburnation for example series 3, image 321, series 302 images 36 through  31.  Small ossifications lateral to the lateral humeral epicondyle.  Olecranon process spur.  Spurring of the coronoid process of the ulna and medial as well as mediolateral humeral epicondyles and radial head..  No acute fractures seen.     Impression:     Degenerative changes multiple loose bodies in the joint        Electronically signed by: Bethany Estrada MD  Date:                                            11/02/2022  Time:                                           12:11    I reviewed a CT scan of his right elbow which demonstrates multiple loose bodies and degenerative changes noted of the right elbow.    IMPRESSION       ICD-10-CM ICD-9-CM   1. Arthritis of right elbow  M19.021 716.92   2. Loose body(ies), joint, elbow, right  M24.021 718.12         MEDICATIONS PRESCRIBED      None    RECOMMENDATIONS     The patient elected to proceed with operative intervention after all risks, benefits, and alternatives were discussed with the patient.  The risks of surgery include bleeding, scarring, injuries to nerves, arteries and veins, need for additional surgeries, blood clots, infections, and the risk of anesthesia.  I personally obtained informed consent from the patient and documented full history and physical, which has been placed on the chart.  Right elbow arthroscopy with extensive debridement, synovectomy, and loose body removal  Referral to physical therapy placed today    All questions were answered, pt will contact us for questions or concerns in the interim.

## 2022-11-16 ENCOUNTER — ANESTHESIA EVENT (OUTPATIENT)
Dept: SURGERY | Facility: HOSPITAL | Age: 62
End: 2022-11-16
Payer: OTHER GOVERNMENT

## 2022-11-17 ENCOUNTER — HOSPITAL ENCOUNTER (OUTPATIENT)
Facility: HOSPITAL | Age: 62
Discharge: HOME | End: 2022-11-17
Attending: ORTHOPAEDIC SURGERY | Admitting: ORTHOPAEDIC SURGERY
Payer: OTHER GOVERNMENT

## 2022-11-17 ENCOUNTER — ANESTHESIA (OUTPATIENT)
Dept: SURGERY | Facility: HOSPITAL | Age: 62
End: 2022-11-17
Payer: OTHER GOVERNMENT

## 2022-11-17 VITALS
OXYGEN SATURATION: 95 % | WEIGHT: 196 LBS | HEART RATE: 75 BPM | HEIGHT: 69 IN | TEMPERATURE: 98 F | SYSTOLIC BLOOD PRESSURE: 131 MMHG | BODY MASS INDEX: 29.03 KG/M2 | DIASTOLIC BLOOD PRESSURE: 76 MMHG | RESPIRATION RATE: 13 BRPM

## 2022-11-17 DIAGNOSIS — M19.021 ARTHRITIS OF RIGHT ELBOW: Primary | ICD-10-CM

## 2022-11-17 DIAGNOSIS — M24.021 LOOSE BODY(IES), JOINT, ELBOW, RIGHT: ICD-10-CM

## 2022-11-17 PROCEDURE — 63600175 PHARM REV CODE 636 W HCPCS: Performed by: ORTHOPAEDIC SURGERY

## 2022-11-17 PROCEDURE — 29834 PR ELBOW ARTHROSCOP,REMV LOOSE BODY: ICD-10-PCS | Mod: 51,RT,, | Performed by: ORTHOPAEDIC SURGERY

## 2022-11-17 PROCEDURE — 37000009 HC ANESTHESIA EA ADD 15 MINS: Performed by: ORTHOPAEDIC SURGERY

## 2022-11-17 PROCEDURE — 29838 PR ELBOW ARTHROSCOP,EXTEN DEBRIDE: ICD-10-PCS | Mod: AS,RT,, | Performed by: PHYSICIAN ASSISTANT

## 2022-11-17 PROCEDURE — 63600175 PHARM REV CODE 636 W HCPCS: Performed by: ANESTHESIOLOGY

## 2022-11-17 PROCEDURE — 25000003 PHARM REV CODE 250: Performed by: ANESTHESIOLOGY

## 2022-11-17 PROCEDURE — 63600175 PHARM REV CODE 636 W HCPCS: Performed by: NURSE ANESTHETIST, CERTIFIED REGISTERED

## 2022-11-17 PROCEDURE — 99900035 HC TECH TIME PER 15 MIN (STAT)

## 2022-11-17 PROCEDURE — 29838 PR ELBOW ARTHROSCOP,EXTEN DEBRIDE: ICD-10-PCS | Mod: RT,,, | Performed by: ORTHOPAEDIC SURGERY

## 2022-11-17 PROCEDURE — 36000710: Performed by: ORTHOPAEDIC SURGERY

## 2022-11-17 PROCEDURE — 94761 N-INVAS EAR/PLS OXIMETRY MLT: CPT

## 2022-11-17 PROCEDURE — D9220A PRA ANESTHESIA: Mod: ANES,,, | Performed by: ANESTHESIOLOGY

## 2022-11-17 PROCEDURE — 27201423 OPTIME MED/SURG SUP & DEVICES STERILE SUPPLY: Performed by: ORTHOPAEDIC SURGERY

## 2022-11-17 PROCEDURE — 25000003 PHARM REV CODE 250: Performed by: NURSE ANESTHETIST, CERTIFIED REGISTERED

## 2022-11-17 PROCEDURE — 36000711: Performed by: ORTHOPAEDIC SURGERY

## 2022-11-17 PROCEDURE — 29838 ELBOW ARTHROSCOPY/SURGERY: CPT | Mod: RT,,, | Performed by: ORTHOPAEDIC SURGERY

## 2022-11-17 PROCEDURE — D9220A PRA ANESTHESIA: Mod: CRNA,,, | Performed by: NURSE ANESTHETIST, CERTIFIED REGISTERED

## 2022-11-17 PROCEDURE — D9220A PRA ANESTHESIA: ICD-10-PCS | Mod: CRNA,,, | Performed by: NURSE ANESTHETIST, CERTIFIED REGISTERED

## 2022-11-17 PROCEDURE — 29834 ELBOW ARTHROSCOPY/SURGERY: CPT | Mod: 51,RT,, | Performed by: ORTHOPAEDIC SURGERY

## 2022-11-17 PROCEDURE — D9220A PRA ANESTHESIA: ICD-10-PCS | Mod: ANES,,, | Performed by: ANESTHESIOLOGY

## 2022-11-17 PROCEDURE — 71000015 HC POSTOP RECOV 1ST HR: Performed by: ORTHOPAEDIC SURGERY

## 2022-11-17 PROCEDURE — 29834 ELBOW ARTHROSCOPY/SURGERY: CPT | Mod: AS,51,RT, | Performed by: PHYSICIAN ASSISTANT

## 2022-11-17 PROCEDURE — 27200651 HC AIRWAY, LMA: Performed by: ANESTHESIOLOGY

## 2022-11-17 PROCEDURE — 29834 PR ELBOW ARTHROSCOP,REMV LOOSE BODY: ICD-10-PCS | Mod: AS,51,RT, | Performed by: PHYSICIAN ASSISTANT

## 2022-11-17 PROCEDURE — 71000033 HC RECOVERY, INTIAL HOUR: Performed by: ORTHOPAEDIC SURGERY

## 2022-11-17 PROCEDURE — 01740 ANES OPN/ARTHRS PX ELBW NOS: CPT | Performed by: ORTHOPAEDIC SURGERY

## 2022-11-17 PROCEDURE — 37000008 HC ANESTHESIA 1ST 15 MINUTES: Performed by: ORTHOPAEDIC SURGERY

## 2022-11-17 PROCEDURE — 29838 ELBOW ARTHROSCOPY/SURGERY: CPT | Mod: AS,RT,, | Performed by: PHYSICIAN ASSISTANT

## 2022-11-17 PROCEDURE — 25000003 PHARM REV CODE 250: Performed by: ORTHOPAEDIC SURGERY

## 2022-11-17 RX ORDER — EPINEPHRINE 1 MG/ML
INJECTION, SOLUTION, CONCENTRATE INTRAVENOUS
Status: DISCONTINUED | OUTPATIENT
Start: 2022-11-17 | End: 2022-11-17 | Stop reason: HOSPADM

## 2022-11-17 RX ORDER — CELECOXIB 200 MG/1
400 CAPSULE ORAL
Status: COMPLETED | OUTPATIENT
Start: 2022-11-17 | End: 2022-11-17

## 2022-11-17 RX ORDER — DEXAMETHASONE SODIUM PHOSPHATE 4 MG/ML
INJECTION, SOLUTION INTRA-ARTICULAR; INTRALESIONAL; INTRAMUSCULAR; INTRAVENOUS; SOFT TISSUE
Status: DISCONTINUED | OUTPATIENT
Start: 2022-11-17 | End: 2022-11-17

## 2022-11-17 RX ORDER — MIDAZOLAM HYDROCHLORIDE 1 MG/ML
INJECTION INTRAMUSCULAR; INTRAVENOUS
Status: DISCONTINUED | OUTPATIENT
Start: 2022-11-17 | End: 2022-11-17

## 2022-11-17 RX ORDER — MIDAZOLAM HYDROCHLORIDE 1 MG/ML
INJECTION INTRAMUSCULAR; INTRAVENOUS
Status: COMPLETED
Start: 2022-11-17 | End: 2022-11-17

## 2022-11-17 RX ORDER — PHENYLEPHRINE HYDROCHLORIDE 10 MG/ML
INJECTION INTRAVENOUS
Status: DISCONTINUED | OUTPATIENT
Start: 2022-11-17 | End: 2022-11-17

## 2022-11-17 RX ORDER — FAMOTIDINE 10 MG/ML
INJECTION INTRAVENOUS
Status: DISCONTINUED | OUTPATIENT
Start: 2022-11-17 | End: 2022-11-17

## 2022-11-17 RX ORDER — MUPIROCIN 20 MG/G
OINTMENT TOPICAL
Status: DISCONTINUED | OUTPATIENT
Start: 2022-11-17 | End: 2022-11-17 | Stop reason: HOSPADM

## 2022-11-17 RX ORDER — PROPOFOL 10 MG/ML
VIAL (ML) INTRAVENOUS
Status: DISCONTINUED | OUTPATIENT
Start: 2022-11-17 | End: 2022-11-17

## 2022-11-17 RX ORDER — BACITRACIN ZINC 500 UNIT/G
OINTMENT (GRAM) TOPICAL
Status: DISCONTINUED | OUTPATIENT
Start: 2022-11-17 | End: 2022-11-17 | Stop reason: HOSPADM

## 2022-11-17 RX ORDER — CEFAZOLIN SODIUM 1 G/3ML
2 INJECTION, POWDER, FOR SOLUTION INTRAMUSCULAR; INTRAVENOUS
Status: COMPLETED | OUTPATIENT
Start: 2022-11-17 | End: 2022-11-17

## 2022-11-17 RX ORDER — ONDANSETRON 2 MG/ML
INJECTION INTRAMUSCULAR; INTRAVENOUS
Status: DISCONTINUED | OUTPATIENT
Start: 2022-11-17 | End: 2022-11-17

## 2022-11-17 RX ORDER — LIDOCAINE HCL/PF 100 MG/5ML
SYRINGE (ML) INTRAVENOUS
Status: DISCONTINUED | OUTPATIENT
Start: 2022-11-17 | End: 2022-11-17

## 2022-11-17 RX ORDER — KETAMINE HCL IN 0.9 % NACL 50 MG/5 ML
SYRINGE (ML) INTRAVENOUS
Status: DISCONTINUED | OUTPATIENT
Start: 2022-11-17 | End: 2022-11-17

## 2022-11-17 RX ORDER — OXYCODONE HYDROCHLORIDE 5 MG/1
5 TABLET ORAL
Status: DISCONTINUED | OUTPATIENT
Start: 2022-11-17 | End: 2022-11-17 | Stop reason: HOSPADM

## 2022-11-17 RX ORDER — FENTANYL CITRATE 50 UG/ML
INJECTION, SOLUTION INTRAMUSCULAR; INTRAVENOUS
Status: DISCONTINUED | OUTPATIENT
Start: 2022-11-17 | End: 2022-11-17

## 2022-11-17 RX ORDER — ACETAMINOPHEN 500 MG
1000 TABLET ORAL
Status: COMPLETED | OUTPATIENT
Start: 2022-11-17 | End: 2022-11-17

## 2022-11-17 RX ORDER — FENTANYL CITRATE 50 UG/ML
25 INJECTION, SOLUTION INTRAMUSCULAR; INTRAVENOUS EVERY 5 MIN PRN
Status: DISCONTINUED | OUTPATIENT
Start: 2022-11-17 | End: 2022-11-17 | Stop reason: HOSPADM

## 2022-11-17 RX ADMIN — ACETAMINOPHEN 1000 MG: 500 TABLET ORAL at 07:11

## 2022-11-17 RX ADMIN — ONDANSETRON 4 MG: 2 INJECTION INTRAMUSCULAR; INTRAVENOUS at 12:11

## 2022-11-17 RX ADMIN — FAMOTIDINE 20 MG: 10 INJECTION, SOLUTION INTRAVENOUS at 10:11

## 2022-11-17 RX ADMIN — CEFAZOLIN 2 G: 330 INJECTION, POWDER, FOR SOLUTION INTRAMUSCULAR; INTRAVENOUS at 10:11

## 2022-11-17 RX ADMIN — MIDAZOLAM HYDROCHLORIDE 2 MG: 1 INJECTION, SOLUTION INTRAMUSCULAR; INTRAVENOUS at 10:11

## 2022-11-17 RX ADMIN — SODIUM CHLORIDE: 0.9 INJECTION, SOLUTION INTRAVENOUS at 09:11

## 2022-11-17 RX ADMIN — FENTANYL CITRATE 25 MCG: 50 INJECTION INTRAMUSCULAR; INTRAVENOUS at 01:11

## 2022-11-17 RX ADMIN — OXYCODONE 5 MG: 5 TABLET ORAL at 01:11

## 2022-11-17 RX ADMIN — FENTANYL CITRATE 50 MCG: 50 INJECTION, SOLUTION INTRAMUSCULAR; INTRAVENOUS at 10:11

## 2022-11-17 RX ADMIN — SODIUM CHLORIDE, SODIUM GLUCONATE, SODIUM ACETATE, POTASSIUM CHLORIDE, MAGNESIUM CHLORIDE, SODIUM PHOSPHATE, DIBASIC, AND POTASSIUM PHOSPHATE: .53; .5; .37; .037; .03; .012; .00082 INJECTION, SOLUTION INTRAVENOUS at 11:11

## 2022-11-17 RX ADMIN — DEXAMETHASONE SODIUM PHOSPHATE 8 MG: 4 INJECTION, SOLUTION INTRAMUSCULAR; INTRAVENOUS at 10:11

## 2022-11-17 RX ADMIN — MUPIROCIN: 20 OINTMENT TOPICAL at 07:11

## 2022-11-17 RX ADMIN — PROPOFOL 200 MG: 10 INJECTION, EMULSION INTRAVENOUS at 10:11

## 2022-11-17 RX ADMIN — FENTANYL CITRATE 50 MCG: 50 INJECTION, SOLUTION INTRAMUSCULAR; INTRAVENOUS at 11:11

## 2022-11-17 RX ADMIN — Medication 25 MG: at 10:11

## 2022-11-17 RX ADMIN — LIDOCAINE HYDROCHLORIDE 100 MG: 20 INJECTION, SOLUTION INTRAVENOUS at 10:11

## 2022-11-17 RX ADMIN — PHENYLEPHRINE HYDROCHLORIDE 100 MCG: 10 INJECTION INTRAVENOUS at 11:11

## 2022-11-17 RX ADMIN — CELECOXIB 400 MG: 200 CAPSULE ORAL at 07:11

## 2022-11-17 NOTE — OP NOTE
[facility])  Surgery Department  Operative Note    SUMMARY     Date of Procedure: 11/17/2022     Pre-Operative Diagnosis: Arthritis of right elbow [M19.021]  Loose body(ies), joint, elbow, right [M24.021]    Post-Operative Diagnosis: Post-Op Diagnosis Codes:     * Arthritis of right elbow [M19.021]     * Loose body(ies), joint, elbow, right [M24.021]    Procedure:   Right elbow arthroscopic extensive debridement  2.   Right elbow arthroscopic extensive synovectomy  3.   Right elbow arthroscopic multiple loose body removal  4.   Right shoulder arthroscopic capsular release    Surgeon(s) and Role:     * Alie Madison MD - Primary    Assistant:   Juan Oakley PA-C    No resident or fellow was available throughout the entire procedure as a result it was medically necessary for Juan Oakley PA-C to perform first assist duties.      Anesthesia: General    Complications:  None    Implants: * No implants in log *    Arthroscopic Findings:  Evaluation of the anterior compartment of the elbow demonstrated extensive osteophytes throughout the anterior distal humerus engaging the distal tip of the coronoid.  Osteophytes noted at the radial head.  No loose bodies noted in the anterior compartment.  Synovitis throughout the anterior compartment.    Evaluation of the posterior compartment demonstrated multiple loose bodies in the posterior compartment.  Large bulky osteophytes throughout the olecranon fossa.  Osteophytes noted at the tip of the olecranon.  No loose bodies in the medial or lateral gutters.    Grade 4 chondromalacia noted in the ulnar humeral joint as well as the majority of the capitellum .      Indication for Procedure: 62 y.o. Male with a history of right elbow pain who is referred by Dr. Cade Levine. 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 He also enjoys riding motorcycles and dirt bikes. He states he first had pain while throwing  several years ago. He has most pain on the lateral and the posterior elbow. He has pain with supination. He also has pain with terminal extension. He states he does not have to be moving the elbow to have pain. He has not done anything to treat the symptoms.  He has also developed mechanical symptoms.  History, physical and imaging were consistent with the above-stated diagnosis.  Risks, benefits and alternatives were discussed with the patient prior to proceeding with surgery.    Surgery in Detail:  The patient was marked identified in the holding area.  He was brought back to the operating room and placed in supine position.  After general endotracheal anesthesia was administered his right upper extremity was prepped and draped in usual sterile fashion for right elbow arthroscopy.  Preoperative antibiotics were given within 1 hour of the procedure a time-out was taken prior starting.  Using a standard soft spot portal the joint was insufflated with approximately 30 cc of saline.  A proximal anterolateral portal was created.  Under direct visualization a proximal anteromedial portal was created.  Evelyn then brought in extensive debridement and synovectomy was done of the anterior compartment removing most of the synovium for both visualization and therapeutic effects.  We then brought in our vapor and released the anterior capsule off the distal humerus.  Five 5 bur was then brought in to debride the distal humeral osteophytes.  We then debrided the tip of the coronoid.  Dynamic flexion-extension were completed intraoperatively to assure that the mechanical impingement was relieved.  We then placed a direct soft spot portal and evaluated the area of the ulnar humeral joint as well as the radial ulnar joint.  No loose bodies were identified in that area.  Under direct visualization a proximal posterolateral portal was created.  Camera was then placed in the proximal posterolateral portal under direct visualization  and direct posterior portal was created.  Evelyn were then brought in extensive debridement and synovectomy was done of the posterior compartment of the elbow.  .  We found 2 loose bodies in the posterior compartment these were removed after small extension of our direct posterior portal was created.  They were removed with pituitaries and taken to the back table.  Five 5 bur was then introduced into the posterior compartment of the elbow and the olecranon fossa was debrided.  We recreated a more anatomic olecranon fossa removing all the bulky osteophytes.  The tip of the olecranon was then debrided with a 5 5 bur as well as a 5 5 hooded bur.  We are able to move around the medial and lateral gutters on each side of the olecranon to assure no further impingement.  The arm was then brought into full extension to assure no more bony impingement.  The arthroscope were then removed from the joints the incision was closed with 3-0 nylon suture simple inverted fashion Adaptic, bacitracin, 4x4s and Webril were then used for dressings.  He was then placed in a simple sling and extubated and taken recovery in satisfactory condition..    Post-Op Plan:  Standard elbow arthroscopy debridement    Attestation: I was present and scrubbed for the entire procedure.

## 2022-11-17 NOTE — ANESTHESIA POSTPROCEDURE EVALUATION
Anesthesia Post Evaluation    Patient: Danny Chatman    Procedure(s) Performed: Procedure(s) (LRB):  ARTHROSCOPY, ELBOW-Loose body removal/debridement (Right)  SYNOVECTOMY, ELBOW (Right)    Final Anesthesia Type: general      Patient location during evaluation: PACU  Patient participation: Yes- Able to Participate  Level of consciousness: awake and alert  Post-procedure vital signs: reviewed and stable  Pain management: adequate  Airway patency: patent    PONV status at discharge: No PONV  Anesthetic complications: no      Cardiovascular status: blood pressure returned to baseline  Respiratory status: unassisted  Hydration status: euvolemic  Follow-up not needed.          Vitals Value Taken Time   /67 11/17/22 1346   Temp 36.6 °C (97.9 °F) 11/17/22 1244   Pulse 74 11/17/22 1346   Resp 12 11/17/22 1346   SpO2 95 % 11/17/22 1346   Vitals shown include unvalidated device data.      Event Time   Out of Recovery 13:41:00         Pain/Joss Score: Pain Rating Prior to Med Admin: 8 (11/17/2022  1:12 PM)  Joss Score: 10 (11/17/2022  1:30 PM)

## 2022-11-17 NOTE — PATIENT INSTRUCTIONS
POST-OPERATIVE DISCHARGE INSTRUCTIONS    Diet: Ice chips, clear liquids, and then diet as tolerated.  Drink plenty of liquids after surgery.  Ice the area at least three times a day (20 minutes per session) if possible.    Elevate the extremity above the level of the heart to help reduce swelling.  Pain medication can be taken every four to six hours as needed.  It is helpful to take pain medication prior to physical therapy. If pain is not controlled, please take 800 mg ibuprofen every 8 hours as needed unless contraindicated (NSAID allergy, kidney disease, heart disease, currently taking blood thinners, etc.).  Any activity that requires precise thinking or accuracy should be avoided for a minimum of 72 hours after surgery and while on narcotic pain medication.  This includes operating machinery and/or driving a vehicle.  Progress range of motion and weightbearing as tolerated.  All sutures will be removed approximately 10-14 days from the time of surgery. Leave steri-strips (skin tapes) in place until sutures are removed.  If skin glue is used instead of stitches, do not apply any ointments or solutions to the incision.  Keep the incision dry.  The skin glue will peel off in 3-4 weeks.  Dressing change directions, unless otherwise instructed:   ?  Elbow scope:  Change dressing on the first post-op day.  Use gauze for the first 3 days, then start using waterproof Band-Aids over the incision sites.  Use an ACE bandage wrapped from the hand the shoulder to limit swelling.  All casts, splints, braces, slings, crutches, abduction pillows, etc. are to be worn as instructed.  Lucho Hose or Sequential Compression Devices are often used following surgery to help with swelling and prevent blood clots.  They can be removed for 2-3 hours daily as needed.  If the hose become uncomfortable after a few days, switch to an Ace Wrap if desired.  Keep all incisions clean and dry for 10-14 days.  A waterproof dressing ([employer] or  [employer]) can be used to shower if not in a splint.  No bath, pool, or hot tub until instructed.  You should notify our office if you notice any of the following:  A temperature greater than 101 F  Severe increased pain  Excessive drainage or redness of the incision  Calf swelling and pain  Chest pain

## 2022-11-17 NOTE — PLAN OF CARE
Pre op complete. Wife at bedside; she will hold on to pt's belongings and valuables. Pt resting comfortably with call light in reach, bed locked, side rails up x2. All questions addressed.

## 2022-11-17 NOTE — TRANSFER OF CARE
"Anesthesia Transfer of Care Note    Patient: Danny Chatman    Procedure(s) Performed: Procedure(s) (LRB):  ARTHROSCOPY, ELBOW-Loose body removal/debridement (Right)  SYNOVECTOMY, ELBOW (Right)    Patient location: PACU    Anesthesia Type: general    Transport from OR: Transported from OR on 6-10 L/min O2 by face mask with adequate spontaneous ventilation    Post pain: adequate analgesia    Post assessment: no apparent anesthetic complications    Post vital signs: stable    Level of consciousness: sedated    Nausea/Vomiting: no nausea/vomiting    Complications: none    Transfer of care protocol was followed      Last vitals:   Visit Vitals  /71 (BP Location: Left arm, Patient Position: Lying)   Pulse 75   Temp 36.4 °C (97.6 °F) (Temporal)   Resp 18   Ht 5' 9" (1.753 m)   Wt 88.9 kg (196 lb)   SpO2 (!) 94%   BMI 28.94 kg/m²     "

## 2022-11-17 NOTE — ANESTHESIA PREPROCEDURE EVALUATION
11/17/2022  Danny Chatman is a 62 y.o., male.  Pre-operative evaluation for Procedure(s) (LRB):  ARTHROSCOPY, ELBOW-Loose body removal/debridement (Right)  SYNOVECTOMY, ELBOW (Right)    Danny Chatman is a 62 y.o. male     Patient Active Problem List   Diagnosis    History of colon polyps    Colon polyp    Diverticulosis of large intestine without hemorrhage    Choroidal nevus of left eye       Review of patient's allergies indicates:  No Known Allergies    No current facility-administered medications on file prior to encounter.     Current Outpatient Medications on File Prior to Encounter   Medication Sig Dispense Refill    MULTIVIT-MIN/FA/LYCOPEN/LUTEIN (CENTRUM SILVER ULTRA MEN'S ORAL) Take 1 tablet by mouth once daily.         Past Surgical History:   Procedure Laterality Date    ANTERIOR CRUCIATE LIGAMENT REPAIR Right 1999    COLONOSCOPY N/A 12/6/2016    Procedure: COLONOSCOPY;  Surgeon: Lucho Vera MD;  Location: [facility];  Service: Endoscopy;  Laterality: N/A;    FINGER FRACTURE SURGERY      R index    VASECTOMY  1989         CBC:  Lab Results   Component Value Date    WBC 6.72 11/08/2022    RBC 4.74 11/08/2022    HGB 14.3 11/08/2022    HCT 42.4 11/08/2022     11/08/2022    MCV 90 11/08/2022    MCH 30.2 11/08/2022    MCHC 33.7 11/08/2022       CMP:   Lab Results   Component Value Date     11/08/2022    K 3.8 11/08/2022     11/08/2022    CO2 26 11/08/2022    BUN 21 11/08/2022    CREATININE 1.0 11/08/2022    GLU 89 11/08/2022    CALCIUM 8.8 11/08/2022    ALBUMIN 3.8 11/08/2022    PROT 6.9 11/08/2022    ALKPHOS 51 (L) 11/08/2022    ALT 18 11/08/2022    AST 15 11/08/2022    BILITOT 0.5 11/08/2022       INR:  No results found for: PT, INR, PROTIME, APTT      Diagnostic Studies:      EKG:   Results for orders placed or performed in visit on 11/07/22   EKG 12-lead     Collection Time: 11/07/22 10:42 AM    Narrative    Test Reason : Z01.818,    Vent. Rate : 062 BPM     Atrial Rate : 062 BPM     P-R Int : 190 ms          QRS Dur : 098 ms      QT Int : 392 ms       P-R-T Axes : 049 076 042 degrees     QTc Int : 397 ms    Normal sinus rhythm  Incomplete right bundle branch block  Borderline Abnormal ECG  No previous ECGs available  Confirmed by Bryce Hoang MD ([id number]) on 11/11/2022 12:54:11 PM    Referred By: SYED SAUCEDO           Confirmed By:Bryce Hoang MD        2D Echo:  No results found for this or any previous visit.    Stress Test:   No results found for this or any previous visit.           Pre-op Assessment          Review of Systems      Physical Exam  General: Well nourished    Airway:  Mallampati: I / I  Mouth Opening: Normal  TM Distance: Normal  Tongue: Normal  Neck ROM: Normal ROM    Dental:  Intact    Chest/Lungs:  Clear to auscultation    Heart:  Rate: Normal  Rhythm: Regular Rhythm  Sounds: Normal        Anesthesia Plan  Type of Anesthesia, risks & benefits discussed:    Anesthesia Type: MAC, Gen ETT, Gen Supraglottic Airway, Gen Natural Airway  Intra-op Monitoring Plan: Standard ASA Monitors  Post Op Pain Control Plan: multimodal analgesia and peripheral nerve block  Induction:  IV  Airway Plan: Direct  Informed Consent: Informed consent signed with the Patient and all parties understand the risks and agree with anesthesia plan.  All questions answered.   ASA Score: 2  Day of Surgery Review of History & Physical: H&P Update referred to the surgeon/provider.    Ready For Surgery From Anesthesia Perspective.     .

## 2022-11-17 NOTE — BRIEF OP NOTE
[facility]  Brief Operative Note    Surgery Date: 11/17/2022     Surgeon(s) and Role:     * Alie Madison MD - Primary    Assisting Provider:     * Juan Oakley PA-C - First Assist    No resident or fellow was available throughout the entire procedure as a result it was medically necessary for Juan Oakley PA-C to perform first assistant duties.    Pre-Operative Diagnosis: Arthritis of right elbow [M19.021]  Loose body(ies), joint, elbow, right [M24.021]    Post-Operative Diagnosis: Post-Op Diagnosis Codes:     * Arthritis of right elbow [M19.021]     * Loose body(ies), joint, elbow, right [M24.021]    Procedure(s) (LRB):  ARTHROSCOPY, ELBOW-Loose body removal/debridement (Right)  SYNOVECTOMY, ELBOW (Right)    Anesthesia: General    Operative Findings: See Op note.    Estimated Blood Loss: * No values recorded between 11/17/2022 11:10 AM and 11/17/2022 12:39 PM *         Specimens:   Specimen (24h ago, onward)      None              Discharge Note    OUTCOME: Patient tolerated treatment/procedure well without complication and is now ready for discharge.    DISPOSITION: Home or Self Care    FINAL DIAGNOSIS:  Post-Op Diagnosis Codes:     * Arthritis of right elbow [M19.021]     * Loose body(ies), joint, elbow, right [M24.021]    FOLLOWUP: In clinic    DISCHARGE INSTRUCTIONS: See attached.

## 2022-11-18 ENCOUNTER — CLINICAL SUPPORT (OUTPATIENT)
Dept: REHABILITATION | Facility: HOSPITAL | Age: 62
End: 2022-11-18
Payer: OTHER GOVERNMENT

## 2022-11-18 DIAGNOSIS — M24.021 LOOSE BODY(IES), JOINT, ELBOW, RIGHT: ICD-10-CM

## 2022-11-18 DIAGNOSIS — M25.621 DECREASED RANGE OF MOTION OF RIGHT ELBOW: Primary | ICD-10-CM

## 2022-11-18 DIAGNOSIS — M19.021 ARTHRITIS OF RIGHT ELBOW: ICD-10-CM

## 2022-11-18 PROCEDURE — 97110 THERAPEUTIC EXERCISES: CPT | Mod: PN

## 2022-11-18 PROCEDURE — 97161 PT EVAL LOW COMPLEX 20 MIN: CPT | Mod: PN

## 2022-11-18 NOTE — PLAN OF CARE
[facility]  Physical Therapy Initial Evaluation    Date: 11/18/2022   Name: Danny Chatman  Clinic Number: [id number]    Therapy Diagnosis:   Encounter Diagnoses   Name Primary?    Arthritis of right elbow     Loose body(ies), joint, elbow, right     Decreased range of motion of right elbow Yes     Physician: Alie Madison MD    Physician Orders: PT Eval and Treat   Medical Diagnosis from Referral:   M19.021 (ICD-10-CM) - Arthritis of right elbow   M24.021 (ICD-10-CM) - Loose body(ies), joint, elbow, right   Evaluation Date: 11/18/2022  Authorization Period Expiration: 3/10/2023  Plan of Care Expiration: 1/18/2022  Visit # / Visits authorized: 1/ 1    Time In: 800a  Time Out: 845a  Total Appointment Time (timed & untimed codes): 45 minutes    Precautions: Standard     DOS: 11/17/2022  Procedure:   Right elbow arthroscopic extensive debridement  2.   Right elbow arthroscopic extensive synovectomy  3.   Right elbow arthroscopic multiple loose body removal  4.   Right shoulder arthroscopic capsular release    Post-Op Plan:  Standard elbow arthroscopy debridement    Subjective   Date of onset: surgery on 11/18/2022  History of current condition - Danny reports: his elbow is a little sore. He states his goal is to get back to weight lifting and riding dirt bikes. Stets he has been moving his arm some but not much.      Medical History:   Past Medical History:   Diagnosis Date    Cataract     Choroidal nevus of left eye 01/25/2018    Colon polyp     Hyperlipidemia        Surgical History:   Danny Chatman  has a past surgical history that includes Anterior cruciate ligament repair (Right, 1999); Finger fracture surgery; Colonoscopy (N/A, 12/6/2016); and Vasectomy (1989).    Medications:   Danny has a current medication list which includes the following prescription(s): esomeprazole, hydrocodone-acetaminophen, indomethacin, and multivit-min/fa/lycopen/lutein.    Allergies:   Review  of patient's allergies indicates:  No Known Allergies     Imaging, none    Prior Therapy: N  Social History:  lives with their family  Occupation: retired  Prior Level of Function: I  Current Level of Function: I; increased elbow pain and limited function    Pain:  Current 4/10, worst 7/10, best 3/10   Location: right elbow    Description: Aching  Aggravating Factors: Extension and Flexing  Easing Factors: rest    Patients goals: get back to riding dirt bikes and lifting weights    Objective     Observation: calm and cooperative; Pt arrive in bulky elbow dressing. Removed dressing to see steri-strips. No signs of infection or erythema. Portal sites covered with band-aids and light adhesive bandages with an ace-wrap on top.      Posture:    Upper thoracic kyphosis    Depressed R scapula  GH IR in stance  Elbow flexed     Active Range of Motion:   Elbow  Left Right   Flexion Full 110   Extension -5 to -10 -30   Supination Full 70   Pronation Full 70      Joint mobility: empty end-feel into flexion; soft tissue stretch into extension    Flexibility: biceps tightness       Limitation/Restriction for FOTO Elbow Survey    Therapist reviewed FOTO scores for Danny Chatman on 11/18/2022.   FOTO documents entered into [employer] - see Media section.    Limitation Score: 51%  Predicted: 33%        TREATMENT   Treatment Time In: 825a  Treatment Time Out: 845a  Total Treatment time (time-based codes) separate from Evaluation: 25 minutes    Danny received therapeutic exercises to develop strength, endurance, ROM, flexibility, and posture for 25 minutes including:     AROM/AAROM Supination/Pronation x 20   AROM/AAROM Elbow Flex/Ext x 20    AROM/AAROM Wrist Flex/Ext x 20    Seated Towel Squeezes x20; 5sex holds    Education - HEP / keep incision sites covered / do not get them wet / report any signs of infection to MD / no weightbearing through RUE      Home Exercises and Patient Education Provided    Education provided:   -  HEP  - see above    Written Home Exercises Provided: yes.  Exercises were reviewed and Danny was able to demonstrate them prior to the end of the session.  Danny demonstrated good  understanding of the education provided.     See EMR under Patient Instructions for exercises provided 11/18/2022.    Assessment   Danny is a 62 y.o. male referred to outpatient Physical Therapy with a medical diagnosis of Loose body(ies), joint, elbow, right and arthritis of right elbow. Pt presents with decreased elbow ROM,Biceps tightness, and functional limitations of inability to functional use RUE due to post-op status. Pt would benefit from skilled PT to further improve his impairments and facilitate a return to PLOF. Increased time was spent today changing his dressing and covering his portal sites with band-aids and adhesive bandaging. Pt was educated to keep his incision sites clean and to avoid getting them wet and to not change his bandages frequently. Pt verbalized understanding.     Patient prognosis is Good.   Patientt will benefit from skilled outpatient Physical Therapy to address the deficits stated above and in the chart below, provide patient /family education, and to maximize patientt's level of independence.     Plan of care discussed with patient: Yes  Patient's spiritual, cultural and educational needs considered and patient is agreeable to the plan of care and goals as stated below:     Anticipated Barriers for therapy: none    Medical Necessity is demonstrated by the following  History  Co-morbidities and personal factors that may impact the plan of care Co-morbidities:   none    Personal Factors:   age     low   Examination  Body Structures and Functions, activity limitations and participation restrictions that may impact the plan of care Body Regions:   upper extremities  trunk    Body Systems:    gross symmetry  ROM  gross coordinated movement  motor control  scar formation  skin integrity  skin  color    Participation Restrictions:   Dirt bike riding    Activity limitations:     no deficits    General Tasks and Commands  no deficits    Communication  no deficits    Mobility  lifting and carrying objects  fine hand use (grasping/picking up)  driving (bike, car, motorcycle)    Self care  washing oneself (bathing, drying, washing hands)  caring for body parts (brushing teeth, shaving, grooming)  toileting  dressing  eating    Domestic Life  doing house work (cleaning house, washing dishes, laundry)  assisting others    Interactions/Relationships  no deficits    Life Areas  no deficits    Community and Social Life  community life  recreation and leisure         moderate   Clinical Presentation stable and uncomplicated low   Decision Making/ Complexity Score: low     GOALS:   Short Term Goals:  4 weeks  1.Report decreased elbow pain < / =  3/10  to increase tolerance for ADL's   2. Increase PROM to - pain-free  3. Increased strength by 1/3 MMT grade in shoulder strength to increase tolerance for ADL and work activities.  4. Pt to tolerate HEP to improve ROM and independence with ADL's     Long Term Goals: 8 weeks  1.Report decreased shoulder pain  < / =  2 /10  to increase tolerance for work-  2.Increase AROM to full pain free  3.Increase strength to >/= 4/5 in elbow to increase tolerance for ADL and work activities.  4. Pt goal: return to dirt bike riding pain-free.  Plan   Plan of care Certification: 11/18/2022 to 1/28/2022.    Outpatient Physical Therapy 2 times weekly for 8 weeks to include the following interventions: Manual Therapy, Moist Heat/ Ice, Neuromuscular Re-ed, Patient Education, Self Care, Therapeutic Activities, and Therapeutic Exercise.     Seth Garcia, PT

## 2022-11-18 NOTE — OPERATIVE NOTE ADDENDUM
Certification of Assistant at Surgery       Surgery Date: 11/17/2022     Participating Surgeons:  Surgeon(s) and Role:     * Alie Madison MD - Primary    Assistant:   Juan Oakley PA-C    No resident or fellow was available throughout the entire procedure as a result it was medically necessary for Juan Oakley PA-C to perform first assist duties.      Procedures:  Procedure(s) (LRB):  ARTHROSCOPY, ELBOW-Loose body removal/debridement (Right)  SYNOVECTOMY, ELBOW (Right)    Assistant Surgeon's Certification of Necessity:  I understand that section 1842 (b) (6) (d) of the Social Security Act generally prohibits Medicare Part B reasonable charge payment for the services of assistants at surgery in teaching hospitals when qualified residents are available to furnish such services. I certify that the services for which payment is claimed were medically necessary, and that no qualified resident was available to perform the services. I further understand that these services are subject to post-payment review by the Medicare carrier.      Juan Oakley PA-C    11/18/2022  8:28 AM

## 2022-11-22 ENCOUNTER — CLINICAL SUPPORT (OUTPATIENT)
Dept: REHABILITATION | Facility: HOSPITAL | Age: 62
End: 2022-11-22
Attending: ORTHOPAEDIC SURGERY
Payer: OTHER GOVERNMENT

## 2022-11-22 DIAGNOSIS — M19.021 ARTHRITIS OF RIGHT ELBOW: Primary | ICD-10-CM

## 2022-11-22 PROCEDURE — 97110 THERAPEUTIC EXERCISES: CPT | Mod: PN,CQ

## 2022-11-22 NOTE — PROGRESS NOTES
[facility]   Physical Therapy Treatment Note     Name: Danny Chatman  Clinic Number: [id number]    Therapy Diagnosis: No diagnosis found.  Physician: Alie Madison MD    Visit Date: 11/22/2022    Physician Orders: PT Eval and Treat   Medical Diagnosis from Referral:   M19.021 (ICD-10-CM) - Arthritis of right elbow   M24.021 (ICD-10-CM) - Loose body(ies), joint, elbow, right   Evaluation Date: 11/18/2022  Authorization Period Expiration: 3/10/2023  Plan of Care Expiration: 1/18/2022  Visit # / Visits authorized: 2 / 15     Time In: 1500  Time Out: 1545  Total Appointment Time (timed & untimed codes): 45 minutes     Precautions: Standard      DOS: 11/17/2022  Procedure:   Right elbow arthroscopic extensive debridement  2.   Right elbow arthroscopic extensive synovectomy  3.   Right elbow arthroscopic multiple loose body removal  4.   Right shoulder arthroscopic capsular release     Post-Op Plan:  Standard elbow arthroscopy debridement    PTA Visit #: 1/5     FOTO first follow up:   FOTO second follow up:   SUBJECTIVE     Pt reports: Already noticing it getting straighter compared to pre-op .  He was compliant with home exercise program.  Response to previous treatment: positive  Functional change: first treat after eval     Pain: 0/10  Location: right elbow      OBJECTIVE     Objective Measures updated at progress report unless specified.     R: ext:-5 ; flx: 125   L: ext: -10 ; flx: 140    Treatment     Danny received the treatments listed below:         Danny received therapeutic exercises to develop strength, endurance, ROM, flexibility, and posture for 45 minutes including:                 AROM/AAROM Supination/Pronation x 30 3#db               AROM Elbow Flex/Ext x 30               AROM/ Wrist Flex/Ext x 30 3#              Seated Towel Squeezes x20; 5sex holds   Scapular reaching, 3 x 10   Active tricep extension in prone, 3 x 010         Patient Education and Home Exercises      Home Exercises Provided and Patient Education Provided     Education provided:   - continue HEP     Written Home Exercises Provided: Patient instructed to cont prior HEP. Exercises were reviewed and Danny was able to demonstrate them prior to the end of the session.  Danny demonstrated good  understanding of the education provided. See EMR under Patient Instructions for exercises provided during therapy sessions    ASSESSMENT     Patient session focused on elbow extension and some strengthening per protocol. Pt with good tolerance to therapy session with no adverse effects reported.        Danny Is progressing well towards his goals.   Pt prognosis is Good.     Pt will continue to benefit from skilled outpatient physical therapy to address the deficits listed in the problem list box on initial evaluation, provide pt/family education and to maximize pt's level of independence in the home and community environment.     Pt's spiritual, cultural and educational needs considered and pt agreeable to plan of care and goals.     Anticipated barriers to physical therapy: none    GOALS:   Short Term Goals:  4 weeks  1.Report decreased elbow pain < / =  3/10  to increase tolerance for ADL's   2. Increase PROM to - pain-free  3. Increased strength by 1/3 MMT grade in shoulder strength to increase tolerance for ADL and work activities.  4. Pt to tolerate HEP to improve ROM and independence with ADL's     Long Term Goals: 8 weeks  1.Report decreased shoulder pain  < / =  2 /10  to increase tolerance for work-  2.Increase AROM to full pain free  3.Increase strength to >/= 4/5 in elbow to increase tolerance for ADL and work activities.  4. Pt goal: return to dirt bike riding pain-free.  PLAN     Continue to treat and progress per POC and pt tolerance         Kwan Bowden, PTA

## 2022-11-29 ENCOUNTER — CLINICAL SUPPORT (OUTPATIENT)
Dept: REHABILITATION | Facility: HOSPITAL | Age: 62
End: 2022-11-29
Payer: OTHER GOVERNMENT

## 2022-11-29 DIAGNOSIS — M25.621 DECREASED RANGE OF MOTION OF RIGHT ELBOW: ICD-10-CM

## 2022-11-29 DIAGNOSIS — M24.021 LOOSE BODY(IES), JOINT, ELBOW, RIGHT: ICD-10-CM

## 2022-11-29 DIAGNOSIS — M19.021 ARTHRITIS OF RIGHT ELBOW: Primary | ICD-10-CM

## 2022-11-29 PROCEDURE — 97140 MANUAL THERAPY 1/> REGIONS: CPT | Mod: PN

## 2022-11-29 PROCEDURE — 97110 THERAPEUTIC EXERCISES: CPT | Mod: PN

## 2022-11-29 NOTE — PROGRESS NOTES
[facility]   Physical Therapy Treatment Note     Name: Danny Chatman  Clinic Number: [id number]    Therapy Diagnosis:   Encounter Diagnoses   Name Primary?    Arthritis of right elbow Yes    Loose body(ies), joint, elbow, right     Decreased range of motion of right elbow      Physician: Alie Madison MD    Visit Date: 11/29/2022    Physician Orders: PT Eval and Treat   Medical Diagnosis from Referral:   M19.021 (ICD-10-CM) - Arthritis of right elbow   M24.021 (ICD-10-CM) - Loose body(ies), joint, elbow, right   Evaluation Date: 11/18/2022  Authorization Period Expiration: 12/31/2022  Plan of Care Expiration: 1/18/2022  Visit # / Visits authorized: 2/15     Time In: 1100a  Time Out: 1145a  Total Appointment Time (timed & untimed codes): 45 minutes     Precautions: Standard      DOS: 11/17/2022  Procedure:   Right elbow arthroscopic extensive debridement  2.   Right elbow arthroscopic extensive synovectomy  3.   Right elbow arthroscopic multiple loose body removal  4.   Right shoulder arthroscopic capsular release     Post-Op Plan:  Standard elbow arthroscopy debridement    PTA Visit #: 1/5     FOTO first follow up:   FOTO second follow up:     Time In: 1100a  Time Out: 1145a  Total Billable Time: 45 minutes    SUBJECTIVE     Pt reports: he did some work in his shop the other day. States he is a little sore but not much.  He was compliant with home exercise program.  Response to previous treatment: felt fine  Functional change: ongoing    Pain: 3/10  Location: right elbow      OBJECTIVE     Objective Measures updated at progress report unless specified.     R: ext:-5 ; flx: 125   L: ext: -10 ; flx: 140    Treatment     Danny received the treatments listed below:         Danny received therapeutic exercises to develop strength, endurance, ROM, flexibility, and posture for 30 minutes including:                  AROM Elbow Flex/Ext x 30               AROM Wrist Flex/Ext x 30 3#               Seated Towel Squeezes x20; 5sex holds   Serratus Punches in supine 3 x 10   Tricep Ext YTB 3 x 15   Bicep Curls 2# 3 x 15  Standing Wrist Flex/Ext curls 2# 3 x 15  Standing Y's YTB 3 x 15  UBE Level 2 4/4 for UE conditioning              Education - HEP / keep incision sites covered / do not get them wet / report any signs of infection to MD / no weightbearing through RUE    manual therapy techniques: Joint mobilizations were applied to the: R elbow for 15 minutes, including:    HU Distraction   HR distraction   HR MWM into ext   HR A/P glides; grade 3         Patient Education and Home Exercises     Home Exercises Provided and Patient Education Provided     Education provided:   - HEP    Written Home Exercises Provided: yes. Exercises were reviewed and Danny was able to demonstrate them prior to the end of the session.  Danny demonstrated good  understanding of the education provided. See EMR under Patient Instructions for exercises provided during therapy sessions    ASSESSMENT   ROM remains the same since last visit. Beginning to apply overpressure into end-range. HEP updated and YTB provided for elbow extension exercises. Pt states he is pleased with his progress thus far.     Danny Is progressing well towards his goals.   Pt prognosis is Good.     Pt will continue to benefit from skilled outpatient physical therapy to address the deficits listed in the problem list box on initial evaluation, provide pt/family education and to maximize pt's level of independence in the home and community environment.     Pt's spiritual, cultural and educational needs considered and pt agreeable to plan of care and goals.     Anticipated barriers to physical therapy: none    GOALS:   Short Term Goals:  4 weeks  1.Report decreased elbow pain < / =  3/10  to increase tolerance for ADL's   2. Increase PROM to - pain-free  3. Increased strength by 1/3 MMT grade in shoulder strength to increase tolerance for ADL  and work activities.  4. Pt to tolerate HEP to improve ROM and independence with ADL's     Long Term Goals: 8 weeks  1.Report decreased shoulder pain  < / =  2 /10  to increase tolerance for work-  2.Increase AROM to full pain free  3.Increase strength to >/= 4/5 in elbow to increase tolerance for ADL and work activities.  4. Pt goal: return to dirt bike riding pain-free.  PLAN     Continue to treat and progress per POC and pt tolerance         Seth Garcia, PT

## 2022-12-01 ENCOUNTER — CLINICAL SUPPORT (OUTPATIENT)
Dept: REHABILITATION | Facility: HOSPITAL | Age: 62
End: 2022-12-01
Payer: OTHER GOVERNMENT

## 2022-12-01 DIAGNOSIS — M25.621 DECREASED RANGE OF MOTION OF RIGHT ELBOW: ICD-10-CM

## 2022-12-01 DIAGNOSIS — M19.021 ARTHRITIS OF RIGHT ELBOW: ICD-10-CM

## 2022-12-01 DIAGNOSIS — M24.021 LOOSE BODY(IES), JOINT, ELBOW, RIGHT: Primary | ICD-10-CM

## 2022-12-01 PROCEDURE — 97110 THERAPEUTIC EXERCISES: CPT | Mod: PN

## 2022-12-01 PROCEDURE — 97140 MANUAL THERAPY 1/> REGIONS: CPT | Mod: PN

## 2022-12-01 NOTE — PROGRESS NOTES
[facility]   Physical Therapy Treatment Note     Name: Danny Chatman  Clinic Number: [id number]    Therapy Diagnosis:   Encounter Diagnoses   Name Primary?    Loose body(ies), joint, elbow, right Yes    Decreased range of motion of right elbow     Arthritis of right elbow        Physician: Alie Madison MD    Visit Date: 12/1/2022    Physician Orders: PT Eval and Treat   Medical Diagnosis from Referral:   M19.021 (ICD-10-CM) - Arthritis of right elbow   M24.021 (ICD-10-CM) - Loose body(ies), joint, elbow, right   Evaluation Date: 11/18/2022  Authorization Period Expiration: 12/31/2022  Plan of Care Expiration: 1/18/2022  Visit # / Visits authorized: 3/15     Time In: 1100a  Time Out: 1145a  Total Appointment Time (timed & untimed codes): 45 minutes     Precautions: Standard      DOS: 11/17/2022  Procedure:   Right elbow arthroscopic extensive debridement  2.   Right elbow arthroscopic extensive synovectomy  3.   Right elbow arthroscopic multiple loose body removal  4.   Right shoulder arthroscopic capsular release     Post-Op Plan:  Standard elbow arthroscopy debridement    PTA Visit #: 1/5     FOTO first follow up:   FOTO second follow up:     Time In: 1100a  Time Out: 1145a  Total Billable Time: 45 minutes    SUBJECTIVE     Pt reports: he did some work in his shop the other day. States he is a little sore but not much.  He was compliant with home exercise program.  Response to previous treatment: felt fine  Functional change: ongoing    Pain: 3/10  Location: right elbow      OBJECTIVE     Objective Measures updated at progress report unless specified.     R: ext:-5 ; flx: 125   L: ext: -10 ; flx: 140    Treatment     Danny received the treatments listed below:         Danny received therapeutic exercises to develop strength, endurance, ROM, flexibility, and posture for 32 minutes including:                  AROM Elbow Flex/Ext x 30               AROM Wrist Flex/Ext x 30 4#               Wrist Sup/Pron 4#  Serratus Punches in supine 3 x 10   Tricep Ext RTB 3 x 15   Bicep Curls 4# 3 x 15  Standing Y's YTB 3 x 15  UBE Level 2 4/4 for UE conditioning              Education - HEP / keep incision sites covered / do not get them wet / report any signs of infection to MD / no weightbearing through RUE    manual therapy techniques: Joint mobilizations were applied to the: R elbow for 13 minutes, including:    HU Distraction   HR distraction   HR MWM into ext   HR A/P glides; grade 3         Patient Education and Home Exercises     Home Exercises Provided and Patient Education Provided     Education provided:   - HEP    Written Home Exercises Provided: yes. Exercises were reviewed and Danny was able to demonstrate them prior to the end of the session.  Danny demonstrated good  understanding of the education provided. See EMR under Patient Instructions for exercises provided during therapy sessions    ASSESSMENT   ROM remains the same since last visit. Light bicep stretching added today. Pt doing well overall and states he is well pleased with outcome. Pt to see Dr. Madison on 12/7. RTB provided for HEP.     Danny Is progressing well towards his goals.   Pt prognosis is Good.     Pt will continue to benefit from skilled outpatient physical therapy to address the deficits listed in the problem list box on initial evaluation, provide pt/family education and to maximize pt's level of independence in the home and community environment.     Pt's spiritual, cultural and educational needs considered and pt agreeable to plan of care and goals.     Anticipated barriers to physical therapy: none    GOALS:   Short Term Goals:  4 weeks  1.Report decreased elbow pain < / =  3/10  to increase tolerance for ADL's   2. Increase PROM to - pain-free  3. Increased strength by 1/3 MMT grade in shoulder strength to increase tolerance for ADL and work activities.  4. Pt to tolerate HEP to improve ROM and  independence with ADL's     Long Term Goals: 8 weeks  1.Report decreased shoulder pain  < / =  2 /10  to increase tolerance for work-  2.Increase AROM to full pain free  3.Increase strength to >/= 4/5 in elbow to increase tolerance for ADL and work activities.  4. Pt goal: return to dirt bike riding pain-free.  PLAN     Continue to treat and progress per POC and pt tolerance         Seth Garcia, PT

## 2022-12-06 ENCOUNTER — CLINICAL SUPPORT (OUTPATIENT)
Dept: REHABILITATION | Facility: HOSPITAL | Age: 62
End: 2022-12-06
Payer: OTHER GOVERNMENT

## 2022-12-06 DIAGNOSIS — M25.621 DECREASED RANGE OF MOTION OF RIGHT ELBOW: Primary | ICD-10-CM

## 2022-12-06 PROCEDURE — 97110 THERAPEUTIC EXERCISES: CPT | Mod: PN,CQ

## 2022-12-06 NOTE — PROGRESS NOTES
"POST-OPERATIVE EXAMINATION    62 y.o. Male who returns for follow after surgery. He is 2 weeks s/p    Procedure:   Right elbow arthroscopic extensive debridement  2.   Right elbow arthroscopic extensive synovectomy  3.   Right elbow arthroscopic multiple loose body removal  4.   Right shoulder arthroscopic capsular release     He is doing well without any issues.       PHYSICAL EXAMINATION:  /76   Pulse 76   Ht 5' 9" (1.753 m)   Wt 88.9 kg (196 lb)   BMI 28.94 kg/m²   General: Well-developed well-nourished 62 y.o. malein no acute distress   Cardiovascular: Regular rhythm   Lungs: No labored breathing or wheezing appreciated   Neuro: Alert and oriented ×3   Psychiatric: well oriented to person, place and time, demonstrates normal mood and affect   Skin: No rashes, lesions or ulcers, normal temperature, turgor, and texture on involved extremity    ORTHOPEDIC EXAM:  Normal post-operative swelling  Normal post-operative scarring  Strength: WNL  ROM: WNL  Tests: None today    ASSESSMENT:      ICD-10-CM ICD-9-CM   1. Hx of elbow surgery  Z98.890 V15.29   2. Loose body(ies), joint, elbow, right  M24.021 718.12       PLAN:       Sutures removed today  Continue physical therapy  RTC in 1 month with repeat right elbow x-rays            "

## 2022-12-06 NOTE — PROGRESS NOTES
[facility]   Physical Therapy Treatment Note     Name: Danny Chatman  Clinic Number: [id number]    Therapy Diagnosis:   No diagnosis found.      Physician: Alie Madison MD    Visit Date: 12/6/2022    Physician Orders: PT Eval and Treat   Medical Diagnosis from Referral:   M19.021 (ICD-10-CM) - Arthritis of right elbow   M24.021 (ICD-10-CM) - Loose body(ies), joint, elbow, right   Evaluation Date: 11/18/2022  Authorization Period Expiration: 12/31/2022  Plan of Care Expiration: 1/18/2022  Visit # / Visits authorized: 4/15     Time In: 1000  Time Out: 1045  Total Appointment Time (timed & untimed codes): 25 minutes     Precautions: Standard      DOS: 11/17/2022  Procedure:   Right elbow arthroscopic extensive debridement  2.   Right elbow arthroscopic extensive synovectomy  3.   Right elbow arthroscopic multiple loose body removal  4.   Right shoulder arthroscopic capsular release     Post-Op Plan:  Standard elbow arthroscopy debridement    PTA Visit #: 1/5     FOTO first follow up:   FOTO second follow up:       SUBJECTIVE     Pt reports: States he lifted some dumbbells over the weekend without going into full elbow extension   He was compliant with home exercise program.  Response to previous treatment: felt fine  Functional change: ongoing    Pain: 3/10  Location: right elbow      OBJECTIVE     Objective Measures updated at progress report unless specified.     R: ext:-5 ; flx: 125   L: ext: -10 ; flx: 140    Treatment     Danny received the treatments listed below:    During 20 minutes of therapeutic exercise, Danny, was supervised by a rehabilitation technician under the direction of the treating therapist.       Danny received therapeutic exercises to develop strength, endurance, ROM, flexibility, and posture for 45 minutes including:                  AROM Elbow Flex/Ext x 30               AROM Wrist Flex/Ext x 30 4#              Wrist Sup/Pron 4#  Serratus Punches in  supine 3 x 10   Tricep Ext RTB 3 x 15   Bicep Curls 4# 3 x 15  Standing Y's YTB 3 x 15  UBE Level 2 4/4 for UE conditioning              Education - HEP / keep incision sites covered / do not get them wet / report any signs of infection to MD / no weightbearing through RUE    manual therapy techniques: Joint mobilizations were applied to the: R elbow for 0 minutes, including:    HU Distraction   HR distraction   HR MWM into ext   HR A/P glides; grade 3         Patient Education and Home Exercises     Home Exercises Provided and Patient Education Provided     Education provided:   - HEP    Written Home Exercises Provided: yes. Exercises were reviewed and Danny was able to demonstrate them prior to the end of the session.  Danny demonstrated good  understanding of the education provided. See EMR under Patient Instructions for exercises provided during therapy sessions    ASSESSMENT   Patient continues to progress well with minor c/o popping when going into end range elbow extension. Has been performing strengthening at home.     Danny Is progressing well towards his goals.   Pt prognosis is Good.     Pt will continue to benefit from skilled outpatient physical therapy to address the deficits listed in the problem list box on initial evaluation, provide pt/family education and to maximize pt's level of independence in the home and community environment.     Pt's spiritual, cultural and educational needs considered and pt agreeable to plan of care and goals.     Anticipated barriers to physical therapy: none    GOALS:   Short Term Goals:  4 weeks  1.Report decreased elbow pain < / =  3/10  to increase tolerance for ADL's   2. Increase PROM to - pain-free  3. Increased strength by 1/3 MMT grade in shoulder strength to increase tolerance for ADL and work activities.  4. Pt to tolerate HEP to improve ROM and independence with ADL's     Long Term Goals: 8 weeks  1.Report decreased shoulder pain  < / =  2 /10  to  increase tolerance for work-  2.Increase AROM to full pain free  3.Increase strength to >/= 4/5 in elbow to increase tolerance for ADL and work activities.  4. Pt goal: return to dirt bike riding pain-free.  PLAN     Continue to treat and progress per POC and pt tolerance         Kwan Bowden, PTA

## 2022-12-07 ENCOUNTER — OFFICE VISIT (OUTPATIENT)
Dept: SPORTS MEDICINE | Facility: CLINIC | Age: 62
End: 2022-12-07
Payer: OTHER GOVERNMENT

## 2022-12-07 VITALS
HEIGHT: 69 IN | WEIGHT: 196 LBS | SYSTOLIC BLOOD PRESSURE: 119 MMHG | HEART RATE: 76 BPM | DIASTOLIC BLOOD PRESSURE: 76 MMHG | BODY MASS INDEX: 29.03 KG/M2

## 2022-12-07 DIAGNOSIS — Z98.890 HX OF ELBOW SURGERY: Primary | ICD-10-CM

## 2022-12-07 DIAGNOSIS — M24.021 LOOSE BODY(IES), JOINT, ELBOW, RIGHT: ICD-10-CM

## 2022-12-07 PROCEDURE — 99024 POSTOP FOLLOW-UP VISIT: CPT | Mod: ,,, | Performed by: ORTHOPAEDIC SURGERY

## 2022-12-07 PROCEDURE — 99213 OFFICE O/P EST LOW 20 MIN: CPT | Mod: PBBFAC | Performed by: ORTHOPAEDIC SURGERY

## 2022-12-07 PROCEDURE — 99999 PR PBB SHADOW E&M-EST. PATIENT-LVL III: ICD-10-PCS | Mod: PBBFAC,,, | Performed by: ORTHOPAEDIC SURGERY

## 2022-12-07 PROCEDURE — 99999 PR PBB SHADOW E&M-EST. PATIENT-LVL III: CPT | Mod: PBBFAC,,, | Performed by: ORTHOPAEDIC SURGERY

## 2022-12-07 PROCEDURE — 99024 PR POST-OP FOLLOW-UP VISIT: ICD-10-PCS | Mod: ,,, | Performed by: ORTHOPAEDIC SURGERY

## 2022-12-08 ENCOUNTER — CLINICAL SUPPORT (OUTPATIENT)
Dept: REHABILITATION | Facility: HOSPITAL | Age: 62
End: 2022-12-08
Payer: OTHER GOVERNMENT

## 2022-12-08 DIAGNOSIS — M25.621 DECREASED RANGE OF MOTION OF RIGHT ELBOW: Primary | ICD-10-CM

## 2022-12-08 DIAGNOSIS — M19.021 ARTHRITIS OF RIGHT ELBOW: ICD-10-CM

## 2022-12-08 DIAGNOSIS — M24.021 LOOSE BODY(IES), JOINT, ELBOW, RIGHT: ICD-10-CM

## 2022-12-08 PROCEDURE — 97140 MANUAL THERAPY 1/> REGIONS: CPT | Mod: PN

## 2022-12-08 PROCEDURE — 97110 THERAPEUTIC EXERCISES: CPT | Mod: PN

## 2022-12-08 NOTE — PROGRESS NOTES
[facility]   Physical Therapy Treatment Note     Name: Danny Chatman  Clinic Number: [id number]    Therapy Diagnosis:   Encounter Diagnoses   Name Primary?    Decreased range of motion of right elbow Yes    Loose body(ies), joint, elbow, right     Arthritis of right elbow      Physician: Alie Madison MD    Visit Date: 12/8/2022    Physician Orders: PT Eval and Treat   Medical Diagnosis from Referral:   M19.021 (ICD-10-CM) - Arthritis of right elbow   M24.021 (ICD-10-CM) - Loose body(ies), joint, elbow, right   Evaluation Date: 11/18/2022  Authorization Period Expiration: 12/31/2022  Plan of Care Expiration: 1/18/2022  Visit # / Visits authorized: 5/15     Time In: 1055a  Time Out: 1140a  Total Appointment Time (timed & untimed codes): 45 minutes     Precautions: Standard      DOS: 11/17/2022  Procedure:   Right elbow arthroscopic extensive debridement  2.   Right elbow arthroscopic extensive synovectomy  3.   Right elbow arthroscopic multiple loose body removal  4.   Right shoulder arthroscopic capsular release     Post-Op Plan:  Standard elbow arthroscopy debridement    PTA Visit #: 1/5     FOTO first follow up:   FOTO second follow up:       SUBJECTIVE     Pt reports: his doctor told him to continue with PT. States he has trouble lifting stop off.      He was compliant with home exercise program.  Response to previous treatment: felt fine  Functional change: ongoing    Pain: 3/10  Location: right elbow      OBJECTIVE     Objective Measures updated at progress report unless specified.     R: ext:-5 ; flx: 125   L: ext: -10 ; flx: 140    Treatment     3 weeks post op as of 12/8/2022     Danny received therapeutic exercises to develop strength, endurance, ROM, flexibility, and posture for 33 minutes including:       Prone IR with towel support 3 x 12 3#               AROM Elbow Flex/Ext x 30  Shoulder CW/CCW circles 3 x 10 2#  Shoulder IR walkout 3 x 10 GTB   Tricep Ext RTB 3 x  15; palm up/down; 3 each    CW/CCW circles on wall with ball 5 x 5 round each  Brueggars GTB 3 x 10 on wall  UBE Level 2 4/4 for UE conditioning               Education - HEP / keep incision sites covered / do not get them wet / report any signs of infection to MD / no weightbearing through RUE    manual therapy techniques: Joint mobilizations were applied to the: R elbow/shoulder  for 12 minutes, including:    HU Distraction   HR distraction   HR MWM into ext   HR A/P glides; grade 3   HU lateral glide with ext MWM  Post Capsule Contract-relax 3 x 8         Patient Education and Home Exercises     Home Exercises Provided and Patient Education Provided     Education provided:   - HEP    Written Home Exercises Provided: yes. Exercises were reviewed and Danny was able to demonstrate them prior to the end of the session.  Danny demonstrated good  understanding of the education provided. See EMR under Patient Instructions for exercises provided during therapy sessions    ASSESSMENT   Patient continues to report improved symptoms. ROM gains remain. Working more on shoulder post capsule to reduce anterior humeral translations. Pt educated to continued to perform HEP.     Danny Is progressing well towards his goals.   Pt prognosis is Good.     Pt will continue to benefit from skilled outpatient physical therapy to address the deficits listed in the problem list box on initial evaluation, provide pt/family education and to maximize pt's level of independence in the home and community environment.     Pt's spiritual, cultural and educational needs considered and pt agreeable to plan of care and goals.     Anticipated barriers to physical therapy: none    GOALS:   Short Term Goals:  4 weeks  1.Report decreased elbow pain < / =  3/10  to increase tolerance for ADL's   2. Increase PROM to - pain-free  3. Increased strength by 1/3 MMT grade in shoulder strength to increase tolerance for ADL and work activities.  4.  Pt to tolerate HEP to improve ROM and independence with ADL's     Long Term Goals: 8 weeks  1.Report decreased shoulder pain  < / =  2 /10  to increase tolerance for work-  2.Increase AROM to full pain free  3.Increase strength to >/= 4/5 in elbow to increase tolerance for ADL and work activities.  4. Pt goal: return to dirt bike riding pain-free.  PLAN     Continue to treat and progress per POC and pt tolerance         Seth Garcia, PT

## 2022-12-13 ENCOUNTER — CLINICAL SUPPORT (OUTPATIENT)
Dept: REHABILITATION | Facility: HOSPITAL | Age: 62
End: 2022-12-13
Payer: OTHER GOVERNMENT

## 2022-12-13 DIAGNOSIS — M25.621 DECREASED RANGE OF MOTION OF RIGHT ELBOW: Primary | ICD-10-CM

## 2022-12-13 PROCEDURE — 97110 THERAPEUTIC EXERCISES: CPT | Mod: PN,CQ

## 2022-12-13 NOTE — PROGRESS NOTES
[facility]   Physical Therapy Treatment Note     Name: Danny Chatman  Clinic Number: [id number]    Therapy Diagnosis:   No diagnosis found.    Physician: Alie Madison MD    Visit Date: 12/13/2022    Physician Orders: PT Eval and Treat   Medical Diagnosis from Referral:   M19.021 (ICD-10-CM) - Arthritis of right elbow   M24.021 (ICD-10-CM) - Loose body(ies), joint, elbow, right   Evaluation Date: 11/18/2022  Authorization Period Expiration: 12/31/2022  Plan of Care Expiration: 1/18/2022  Visit # / Visits authorized: 5/15     Time In: 1300  Time Out: 1350  Total Appointment Time (timed & untimed codes): 50minutes     Precautions: Standard      DOS: 11/17/2022  Procedure:   Right elbow arthroscopic extensive debridement  2.   Right elbow arthroscopic extensive synovectomy  3.   Right elbow arthroscopic multiple loose body removal  4.   Right shoulder arthroscopic capsular release     Post-Op Plan:  Standard elbow arthroscopy debridement    PTA Visit #: 1/5     FOTO first follow up:   FOTO second follow up:       SUBJECTIVE     Pt reports: his doctor told him to continue with PT. States he has trouble lifting stop off.      He was compliant with home exercise program.  Response to previous treatment: felt fine  Functional change: ongoing    Pain: 3/10  Location: right elbow      OBJECTIVE     Objective Measures updated at progress report unless specified.     R: ext:-5 ; flx: 125   L: ext: -10 ; flx: 140    Treatment     3 weeks post op as of 12/8/2022     Danny received therapeutic exercises to develop strength, endurance, ROM, flexibility, and posture for 50 minutes including:       Prone IR with towel support 3 x 12 3#               AROM Elbow Flex/Ext x 30  Shoulder CW/CCW circles 3 x 10 3#  Shoulder IR walkout 3 x 10 GTB   Tricep Ext RTB 3 x 15; palm up/down; 3 each    CW/CCW circles on wall with ball 5 x 5 round each  Brueggars GTB 3 x 10 on wall  UBE Level 2 4/4 for UE  conditioning   Wall push ups, 2 x 10   Push ups on window, 3 x 10               Education - HEP / keep incision sites covered / do not get them wet / report any signs of infection to MD / no weightbearing through RUE    manual therapy techniques: Joint mobilizations were applied to the: R elbow/shoulder  for 0 minutes, including:    HU Distraction   HR distraction   HR MWM into ext   HR A/P glides; grade 3   HU lateral glide with ext MWM  Post Capsule Contract-relax 3 x 8         Patient Education and Home Exercises     Home Exercises Provided and Patient Education Provided     Education provided:   - HEP    Written Home Exercises Provided: yes. Exercises were reviewed and Danny was able to demonstrate them prior to the end of the session.  Danny demonstrated good  understanding of the education provided. See EMR under Patient Instructions for exercises provided during therapy sessions    ASSESSMENT   Patient able to perform push ups on wall and in gravity dependent position at window without onset of pain. Pt with good tolerance to therapy session with no adverse effects reported.        Danny Is progressing well towards his goals.   Pt prognosis is Good.     Pt will continue to benefit from skilled outpatient physical therapy to address the deficits listed in the problem list box on initial evaluation, provide pt/family education and to maximize pt's level of independence in the home and community environment.     Pt's spiritual, cultural and educational needs considered and pt agreeable to plan of care and goals.     Anticipated barriers to physical therapy: none    GOALS:   Short Term Goals:  4 weeks  1.Report decreased elbow pain < / =  3/10  to increase tolerance for ADL's   2. Increase PROM to - pain-free  3. Increased strength by 1/3 MMT grade in shoulder strength to increase tolerance for ADL and work activities.  4. Pt to tolerate HEP to improve ROM and independence with ADL's     Long Term  Goals: 8 weeks  1.Report decreased shoulder pain  < / =  2 /10  to increase tolerance for work-  2.Increase AROM to full pain free  3.Increase strength to >/= 4/5 in elbow to increase tolerance for ADL and work activities.  4. Pt goal: return to dirt bike riding pain-free.  PLAN     Continue to treat and progress per POC and pt tolerance         Kwan Bowden, PTA

## 2022-12-15 ENCOUNTER — CLINICAL SUPPORT (OUTPATIENT)
Dept: REHABILITATION | Facility: HOSPITAL | Age: 62
End: 2022-12-15
Payer: OTHER GOVERNMENT

## 2022-12-15 DIAGNOSIS — M19.021 ARTHRITIS OF RIGHT ELBOW: ICD-10-CM

## 2022-12-15 DIAGNOSIS — M25.621 DECREASED RANGE OF MOTION OF RIGHT ELBOW: Primary | ICD-10-CM

## 2022-12-15 DIAGNOSIS — M24.021 LOOSE BODY(IES), JOINT, ELBOW, RIGHT: ICD-10-CM

## 2022-12-15 PROCEDURE — 97140 MANUAL THERAPY 1/> REGIONS: CPT | Mod: PN

## 2022-12-15 PROCEDURE — 97110 THERAPEUTIC EXERCISES: CPT | Mod: PN

## 2022-12-15 NOTE — PROGRESS NOTES
[facility]   Physical Therapy Treatment Note     Name: Danny Chatman  Clinic Number: [id number]    Therapy Diagnosis:   Encounter Diagnoses   Name Primary?    Decreased range of motion of right elbow Yes    Loose body(ies), joint, elbow, right     Arthritis of right elbow        Physician: Alie Madison MD    Visit Date: 12/15/2022    Physician Orders: PT Eval and Treat   Medical Diagnosis from Referral:   M19.021 (ICD-10-CM) - Arthritis of right elbow   M24.021 (ICD-10-CM) - Loose body(ies), joint, elbow, right   Evaluation Date: 11/18/2022  Authorization Period Expiration: 12/31/2022  Plan of Care Expiration: 1/18/2022  Visit # / Visits authorized: 6/15     Time In: 1058g  Time Out: 1141a  Total Appointment Time (timed & untimed codes): 46 minutes     Precautions: Standard      DOS: 11/17/2022  Procedure:   Right elbow arthroscopic extensive debridement  2.   Right elbow arthroscopic extensive synovectomy  3.   Right elbow arthroscopic multiple loose body removal  4.   Right shoulder arthroscopic capsular release     Post-Op Plan:  Standard elbow arthroscopy debridement    PTA Visit #: 1/5     FOTO first follow up:   FOTO second follow up:       SUBJECTIVE     Pt reports: he thinks he is healing just fine.      He was compliant with home exercise program.  Response to previous treatment: felt fine  Functional change: ongoing    Pain: 3/10  Location: right elbow      OBJECTIVE     Objective Measures updated at progress report unless specified.     R: ext:-5 ; flx: 125   L: ext: -10 ; flx: 140    Treatment     5 weeks post op as of 12/15/2022     Danny received therapeutic exercises to develop strength, endurance, ROM, flexibility, and posture for 36 minutes including:       Static bicep stretch x 4 minutes   Quadruped UE weight shift R<>L; 4 x 30sec    Quadruped UE reach 4 x 10 B    Sled push 0# 4 x 10 yards  Shoulder ER/IR at 90 flexion walkout 3 x 10 BTB   Tricep Ext BTB 3 x  15; palm up/down; 3 each    UBE Level 2 4/4 for UE conditioning               Education - HEP / extension stretching    manual therapy techniques: Joint mobilizations were applied to the: R elbow/shoulder  for 10 minutes, including:    HU Distraction   HR MWM into Elbow Ext; grade 3   HU lateral glide with flex/ext MWM  Post Capsule Contract-relax 3 x 8         Patient Education and Home Exercises     Home Exercises Provided and Patient Education Provided     Education provided:   - HEP    Written Home Exercises Provided: yes. Exercises were reviewed and Danny was able to demonstrate them prior to the end of the session.  Danny demonstrated good  understanding of the education provided. See EMR under Patient Instructions for exercises provided during therapy sessions    ASSESSMENT   Patient continues to report improved symptoms. ROM remains. Incorporated CKC strengthening today with good tolerance. Still need to work on weightbearing tolerance moving forward.      Danny Is progressing well towards his goals.   Pt prognosis is Good.     Pt will continue to benefit from skilled outpatient physical therapy to address the deficits listed in the problem list box on initial evaluation, provide pt/family education and to maximize pt's level of independence in the home and community environment.     Pt's spiritual, cultural and educational needs considered and pt agreeable to plan of care and goals.     Anticipated barriers to physical therapy: none    GOALS:   Short Term Goals:  4 weeks  1.Report decreased elbow pain < / =  3/10  to increase tolerance for ADL's   2. Increase PROM to - pain-free  3. Increased strength by 1/3 MMT grade in shoulder strength to increase tolerance for ADL and work activities.  4. Pt to tolerate HEP to improve ROM and independence with ADL's     Long Term Goals: 8 weeks  1.Report decreased shoulder pain  < / =  2 /10  to increase tolerance for work-  2.Increase AROM to full pain  free  3.Increase strength to >/= 4/5 in elbow to increase tolerance for ADL and work activities.  4. Pt goal: return to dirt bike riding pain-free.  PLAN     Continue to treat and progress per POC and pt tolerance         Seth Garcia, PT

## 2022-12-20 ENCOUNTER — CLINICAL SUPPORT (OUTPATIENT)
Dept: REHABILITATION | Facility: HOSPITAL | Age: 62
End: 2022-12-20
Payer: OTHER GOVERNMENT

## 2022-12-20 DIAGNOSIS — M19.021 ARTHRITIS OF RIGHT ELBOW: Primary | ICD-10-CM

## 2022-12-20 PROCEDURE — 97110 THERAPEUTIC EXERCISES: CPT | Mod: PN,CQ

## 2022-12-20 NOTE — PROGRESS NOTES
[facility]   Physical Therapy Treatment Note     Name: Danny Chatman  Clinic Number: [id number]    Therapy Diagnosis:   No diagnosis found.      Physician: Alie Madison MD    Visit Date: 12/20/2022    Physician Orders: PT Eval and Treat   Medical Diagnosis from Referral:   M19.021 (ICD-10-CM) - Arthritis of right elbow   M24.021 (ICD-10-CM) - Loose body(ies), joint, elbow, right   Evaluation Date: 11/18/2022  Authorization Period Expiration: 12/31/2022  Plan of Care Expiration: 1/18/2022  Visit # / Visits authorized: 6/15     Time In: 1105  Time Out: 1145   Total Appointment Time (timed & untimed codes): 40 minutes     Precautions: Standard      DOS: 11/17/2022  Procedure:   Right elbow arthroscopic extensive debridement  2.   Right elbow arthroscopic extensive synovectomy  3.   Right elbow arthroscopic multiple loose body removal  4.   Right shoulder arthroscopic capsular release     Post-Op Plan:  Standard elbow arthroscopy debridement    PTA Visit #: 1/5     FOTO first follow up:   FOTO second follow up:       SUBJECTIVE     Pt reports: Happy with the progress thus far      He was compliant with home exercise program.  Response to previous treatment: felt fine  Functional change: ongoing    Pain: 3/10  Location: right elbow      OBJECTIVE     Objective Measures updated at progress report unless specified.     R: ext:-5 ; flx: 125   L: ext: -10 ; flx: 140    Treatment     5 weeks post op as of 12/15/2022     Danny received therapeutic exercises to develop strength, endurance, ROM, flexibility, and posture for 40 minutes including:       Static bicep stretch x 4 minutes  UBE Level 3 4/4 for UE conditioning    Quadruped UE weight shift R<>L; 4 x 30sec    Quadruped UE reach 4 x 10 B    Sled push 0# 4 x 10 yards  Shoulder ER/IR at 90 flexion walkout 3 x 10 BTB   Tricep Ext BTB 3 x 15; palm up/down; 3 each    Wall pushups, 3 x 10                   manual therapy techniques: Joint  mobilizations were applied to the: R elbow/shoulder  for 0 minutes, including:    HU Distraction   HR MWM into Elbow Ext; grade 3   HU lateral glide with flex/ext MWM  Post Capsule Contract-relax 3 x 8         Patient Education and Home Exercises     Home Exercises Provided and Patient Education Provided     Education provided:   - HEP    Written Home Exercises Provided: yes. Exercises were reviewed and Danny was able to demonstrate them prior to the end of the session.  Danny demonstrated good  understanding of the education provided. See EMR under Patient Instructions for exercises provided during therapy sessions    ASSESSMENT   Sustained bicep stretch performed with 1# weight today. Patient also progressed to wall pushups to initiate active loading into elbow extension which he tolerated very well. Pt with good tolerance to therapy session with no adverse effects reported.        Danny Is progressing well towards his goals.   Pt prognosis is Good.     Pt will continue to benefit from skilled outpatient physical therapy to address the deficits listed in the problem list box on initial evaluation, provide pt/family education and to maximize pt's level of independence in the home and community environment.     Pt's spiritual, cultural and educational needs considered and pt agreeable to plan of care and goals.     Anticipated barriers to physical therapy: none    GOALS:   Short Term Goals:  4 weeks  1.Report decreased elbow pain < / =  3/10  to increase tolerance for ADL's   2. Increase PROM to - pain-free  3. Increased strength by 1/3 MMT grade in shoulder strength to increase tolerance for ADL and work activities.  4. Pt to tolerate HEP to improve ROM and independence with ADL's     Long Term Goals: 8 weeks  1.Report decreased shoulder pain  < / =  2 /10  to increase tolerance for work-  2.Increase AROM to full pain free  3.Increase strength to >/= 4/5 in elbow to increase tolerance for ADL and work  activities.  4. Pt goal: return to dirt bike riding pain-free.  PLAN     Continue to treat and progress per POC and pt tolerance         Kwan Bowden, PTA

## 2022-12-22 ENCOUNTER — CLINICAL SUPPORT (OUTPATIENT)
Dept: REHABILITATION | Facility: HOSPITAL | Age: 62
End: 2022-12-22
Payer: OTHER GOVERNMENT

## 2022-12-22 DIAGNOSIS — M25.621 DECREASED RANGE OF MOTION OF RIGHT ELBOW: ICD-10-CM

## 2022-12-22 DIAGNOSIS — M19.021 ARTHRITIS OF RIGHT ELBOW: Primary | ICD-10-CM

## 2022-12-22 DIAGNOSIS — M24.021 LOOSE BODY(IES), JOINT, ELBOW, RIGHT: ICD-10-CM

## 2022-12-22 PROCEDURE — 97140 MANUAL THERAPY 1/> REGIONS: CPT | Mod: PN

## 2022-12-22 PROCEDURE — 97110 THERAPEUTIC EXERCISES: CPT | Mod: PN

## 2022-12-22 NOTE — PROGRESS NOTES
[facility]   Physical Therapy Treatment Note     Name: Danny Chatman  Clinic Number: [id number]    Therapy Diagnosis:   Encounter Diagnoses   Name Primary?    Arthritis of right elbow [M19.021 (ICD-10-CM)] Yes    Decreased range of motion of right elbow     Loose body(ies), joint, elbow, right          Physician: Alie Madison MD    Visit Date: 12/22/2022    Physician Orders: PT Eval and Treat   Medical Diagnosis from Referral:   M19.021 (ICD-10-CM) - Arthritis of right elbow   M24.021 (ICD-10-CM) - Loose body(ies), joint, elbow, right   Evaluation Date: 11/18/2022  Authorization Period Expiration: 12/31/2022  Plan of Care Expiration: 1/18/2022  Visit # / Visits authorized: 7/15     Time In: 1100a  Time Out: 1145a  Total Appointment Time (timed & untimed codes): 45 minutes     Precautions: Standard      DOS: 11/17/2022  Procedure:   Right elbow arthroscopic extensive debridement  2.   Right elbow arthroscopic extensive synovectomy  3.   Right elbow arthroscopic multiple loose body removal  4.   Right shoulder arthroscopic capsular release     Post-Op Plan:  Standard elbow arthroscopy debridement    PTA Visit #: 1/5     FOTO first follow up:   FOTO second follow up:   SUBJECTIVE     Pt reports: he is a little sore today but was not sore after last session.      He was compliant with home exercise program.  Response to previous treatment: felt fine  Functional change: ongoing    Pain: 3/10  Location: right elbow      OBJECTIVE     Objective Measures updated at progress report unless specified.     R: ext:-10 active/ -5 passive ; flx: 140 active  L: ext: -10 ; flx: 140    Treatment     6 weeks post op as of 12/22/2022     Danny received therapeutic exercises to develop strength, endurance, ROM, flexibility, and posture for 34 minutes including:       Static bicep stretch x 5 minutes  UBE Level 3 4/4 for UE conditioning   Tricep extension 3 x 10 RTB   Quadruped UE weight shift R<>L; 4  x 30sec    Quadruped Serratus Press 4 x 10 B    Sled push 0# 4 x 10 yards  Shoulder IR at 90 flexion walkout 3 x 10 BTB   Incline pushups 60 degree, 3 x 10                   manual therapy techniques: Joint mobilizations were applied to the: R elbow/shoulder  for 11 minutes, including:    HU Distraction   HR MWM into Elbow Ext; grade 3   HU lateral glide with flex/ext MWM  Post Capsule Contract-relax 3 x 8         Patient Education and Home Exercises     Home Exercises Provided and Patient Education Provided     Education provided:   - HEP    Written Home Exercises Provided: yes. Exercises were reviewed and Danny was able to demonstrate them prior to the end of the session.  Danny demonstrated good  understanding of the education provided. See EMR under Patient Instructions for exercises provided during therapy sessions    ASSESSMENT   ROM  currently. Passively gets to 5 extension. Working on CKC strengthening moving forward in pain-free ranges.       Danny Is progressing well towards his goals.   Pt prognosis is Good.     Pt will continue to benefit from skilled outpatient physical therapy to address the deficits listed in the problem list box on initial evaluation, provide pt/family education and to maximize pt's level of independence in the home and community environment.     Pt's spiritual, cultural and educational needs considered and pt agreeable to plan of care and goals.     Anticipated barriers to physical therapy: none    GOALS:   Short Term Goals:  4 weeks  1.Report decreased elbow pain < / =  3/10  to increase tolerance for ADL's   2. Increase PROM to - pain-free  3. Increased strength by 1/3 MMT grade in shoulder strength to increase tolerance for ADL and work activities.  4. Pt to tolerate HEP to improve ROM and independence with ADL's     Long Term Goals: 8 weeks  1.Report decreased shoulder pain  < / =  2 /10  to increase tolerance for work-  2.Increase AROM to full pain  free  3.Increase strength to >/= 4/5 in elbow to increase tolerance for ADL and work activities.  4. Pt goal: return to dirt bike riding pain-free.  PLAN     Continue to treat and progress per POC and pt tolerance         Seth Garcia, PT

## 2022-12-28 ENCOUNTER — CLINICAL SUPPORT (OUTPATIENT)
Dept: REHABILITATION | Facility: HOSPITAL | Age: 62
End: 2022-12-28
Payer: OTHER GOVERNMENT

## 2022-12-28 DIAGNOSIS — M19.021 ARTHRITIS OF RIGHT ELBOW: Primary | ICD-10-CM

## 2022-12-28 DIAGNOSIS — M24.021 LOOSE BODY(IES), JOINT, ELBOW, RIGHT: ICD-10-CM

## 2022-12-28 DIAGNOSIS — M25.621 DECREASED RANGE OF MOTION OF RIGHT ELBOW: ICD-10-CM

## 2022-12-28 PROCEDURE — 97110 THERAPEUTIC EXERCISES: CPT | Mod: PN

## 2022-12-28 PROCEDURE — 97140 MANUAL THERAPY 1/> REGIONS: CPT | Mod: PN

## 2022-12-28 NOTE — PROGRESS NOTES
[facility]   Physical Therapy Treatment Note     Name: Danny Chatman  Clinic Number: [id number]    Therapy Diagnosis:   Encounter Diagnoses   Name Primary?    Arthritis of right elbow [M19.021 (ICD-10-CM)] Yes    Decreased range of motion of right elbow     Loose body(ies), joint, elbow, right      Physician: Alie Madison MD    Visit Date: 12/28/2022    Physician Orders: PT Eval and Treat   Medical Diagnosis from Referral:   M19.021 (ICD-10-CM) - Arthritis of right elbow   M24.021 (ICD-10-CM) - Loose body(ies), joint, elbow, right   Evaluation Date: 11/18/2022  Authorization Period Expiration: 12/31/2022  Plan of Care Expiration: 1/18/2022  Visit # / Visits authorized: 8/15     Time In: 1100a  Time Out: 1145a  Total Appointment Time (timed & untimed codes): 45 minutes     Precautions: Standard      DOS: 11/17/2022  Procedure:   Right elbow arthroscopic extensive debridement  2.   Right elbow arthroscopic extensive synovectomy  3.   Right elbow arthroscopic multiple loose body removal  4.   Right shoulder arthroscopic capsular release     Post-Op Plan:  Standard elbow arthroscopy debridement    PTA Visit #: 1/5     FOTO first follow up:   FOTO second follow up:   SUBJECTIVE     Pt reports: he is a little sore today but was not sore after last session.      He was compliant with home exercise program.  Response to previous treatment: felt fine  Functional change: ongoing    Pain: 3/10  Location: right elbow      OBJECTIVE     Objective Measures updated at progress report unless specified.     R: ext:-10 active/ -5 passive ; flx: 140 active  L: ext: -10 ; flx: 140    Treatment     6 weeks and 6 days post op as of 12/28/2022     Danny received therapeutic exercises to develop strength, endurance, ROM, flexibility, and posture for 36 minutes including:       Static bicep stretch x 5 minutes  UBE Level 3 4/4 for UE conditioning   Tricep extension 3 x 10 RTB   Quadruped UE weight shift  R<>L; 4 x 30sec    Quadruped Serratus Press 4 x 10 B    Sled push 0# 4 x 10 yards  Shoulder IR at 90 flexion walkout 3 x 10 BTB   Incline pushups 60 degree, 3 x 10                    manual therapy techniques: Joint mobilizations were applied to the: R elbow/shoulder  for 9 minutes, including:    HU Distraction   HR MWM into Elbow Ext; grade 3   HU lateral glide with flex/ext MWM  Post Capsule Contract-relax 3 x 8         Patient Education and Home Exercises     Home Exercises Provided and Patient Education Provided     Education provided:   - HEP    Written Home Exercises Provided: yes. Exercises were reviewed and Danny was able to demonstrate them prior to the end of the session.  Danny demonstrated good  understanding of the education provided. See EMR under Patient Instructions for exercises provided during therapy sessions    ASSESSMENT   ROM  currently. Passively gets to 5 extension still. He was able to perform a modified push-up today for the first time since surgery. Pt remains well pleased with outcome. He sees Dr. Madison in the coming week.       Danny Is progressing well towards his goals.   Pt prognosis is Good.     Pt will continue to benefit from skilled outpatient physical therapy to address the deficits listed in the problem list box on initial evaluation, provide pt/family education and to maximize pt's level of independence in the home and community environment.     Pt's spiritual, cultural and educational needs considered and pt agreeable to plan of care and goals.     Anticipated barriers to physical therapy: none    GOALS:   Short Term Goals:  4 weeks  1.Report decreased elbow pain < / =  3/10  to increase tolerance for ADL's   2. Increase PROM to - pain-free  3. Increased strength by 1/3 MMT grade in shoulder strength to increase tolerance for ADL and work activities.  4. Pt to tolerate HEP to improve ROM and independence with ADL's     Long Term Goals: 8 weeks  1.Report  decreased shoulder pain  < / =  2 /10  to increase tolerance for work-  2.Increase AROM to full pain free  3.Increase strength to >/= 4/5 in elbow to increase tolerance for ADL and work activities.  4. Pt goal: return to dirt bike riding pain-free.  PLAN     Continue to treat and progress per POC and pt tolerance         Seth Garcia, PT

## 2023-01-03 ENCOUNTER — CLINICAL SUPPORT (OUTPATIENT)
Dept: REHABILITATION | Facility: HOSPITAL | Age: 63
End: 2023-01-03
Payer: OTHER GOVERNMENT

## 2023-01-03 PROCEDURE — 97140 MANUAL THERAPY 1/> REGIONS: CPT | Mod: PN

## 2023-01-03 PROCEDURE — 97110 THERAPEUTIC EXERCISES: CPT | Mod: PN

## 2023-01-03 NOTE — PROGRESS NOTES
[facility]   Physical Therapy Treatment Note     Name: Danny Chatman  Clinic Number: [id number]    Therapy Diagnosis:   No diagnosis found.    Physician: Alie Madison MD    Visit Date: 1/3/2023    Physician Orders: PT Eval and Treat   Medical Diagnosis from Referral:   M19.021 (ICD-10-CM) - Arthritis of right elbow   M24.021 (ICD-10-CM) - Loose body(ies), joint, elbow, right   Evaluation Date: 11/18/2022  Authorization Period Expiration: 12/31/2022  Plan of Care Expiration: 1/18/2022  Visit # / Visits authorized: 9/15     Time In: 11:00  Time Out: 11:56  Total Appointment Time (timed & untimed codes): 56 minutes     Precautions: Standard      DOS: 11/17/2022  Procedure:   Right elbow arthroscopic extensive debridement  2.   Right elbow arthroscopic extensive synovectomy  3.   Right elbow arthroscopic multiple loose body removal  4.   Right shoulder arthroscopic capsular release     Post-Op Plan:  Standard elbow arthroscopy debridement    PTA Visit #: 1/5     FOTO first follow up:   FOTO second follow up:   SUBJECTIVE     Pt reports: he is feeling good with no complaints. He states that he only has pain with full elbow extension.      He was compliant with home exercise program.  Response to previous treatment: felt fine  Functional change: ongoing    Pain: 2/10 with full elbow extension  Location: right elbow      OBJECTIVE     Objective Measures updated at progress report unless specified.     R: ext:-10 active/ -5 passive ; flx: 140 active  L: ext: -10 ; flx: 140    Treatment     6 weeks and 6 days post op as of 12/28/2022     Danny received therapeutic exercises to develop strength, endurance, ROM, flexibility, and posture for 43 minutes including:       Static bicep stretch x 5 minutes  UBE Level 3 4/4 for UE conditioning   Tricep extension 3 x 10 RTB   Quadruped UE weight shift R<>L; 4 x 30sec    Quadruped Serratus Press 4 x 10 B    Sled push 0# 4 x 10 yards  Shoulder IR at  90 flexion walkout 3 x 10 BTB   Incline pushups 60 degree, 3 x 10                  manual therapy techniques: Joint mobilizations were applied to the: R elbow/shoulder  for 13 minutes, including:    HU Distraction   HR MWM into Elbow Ext; grade 3   HU lateral glide with flex/ext MWM  GH Post Capsule Contract-relax 3 x 8         Patient Education and Home Exercises     Home Exercises Provided and Patient Education Provided     Education provided:   - HEP    Written Home Exercises Provided: yes. Exercises were reviewed and Danny was able to demonstrate them prior to the end of the session.  Danny demonstrated good  understanding of the education provided. See EMR under Patient Instructions for exercises provided during therapy sessions    ASSESSMENT   Pt continues to present with elbow PROM 5-140 currently. He tolerated exercises well, but did have increase in symptoms when he performs full knee elbow extension.  He can perform a modified push-ups, but still feels a little weary about performing full push up. Pt continues to be pleased with his outcome. He will Dr. Madison tomorrow.       Danny Is progressing well towards his goals.   Pt prognosis is Good.     Pt will continue to benefit from skilled outpatient physical therapy to address the deficits listed in the problem list box on initial evaluation, provide pt/family education and to maximize pt's level of independence in the home and community environment.     Pt's spiritual, cultural and educational needs considered and pt agreeable to plan of care and goals.     Anticipated barriers to physical therapy: none    GOALS:   Short Term Goals:  4 weeks  1.Report decreased elbow pain < / =  3/10  to increase tolerance for ADL's   2. Increase PROM to - pain-free  3. Increased strength by 1/3 MMT grade in shoulder strength to increase tolerance for ADL and work activities.  4. Pt to tolerate HEP to improve ROM and independence with ADL's     Long Term Goals:  8 weeks  1.Report decreased shoulder pain  < / =  2 /10  to increase tolerance for work-  2.Increase AROM to full pain free  3.Increase strength to >/= 4/5 in elbow to increase tolerance for ADL and work activities.  4. Pt goal: return to dirt bike riding pain-free.  PLAN     Continue to treat and progress per POC and pt tolerance     Pierce Chavez, PT, DPT

## 2023-01-04 ENCOUNTER — OFFICE VISIT (OUTPATIENT)
Dept: SPORTS MEDICINE | Facility: CLINIC | Age: 63
End: 2023-01-04
Payer: OTHER GOVERNMENT

## 2023-01-04 ENCOUNTER — HOSPITAL ENCOUNTER (OUTPATIENT)
Dept: RADIOLOGY | Facility: HOSPITAL | Age: 63
Discharge: HOME | End: 2023-01-04
Attending: ORTHOPAEDIC SURGERY
Payer: OTHER GOVERNMENT

## 2023-01-04 VITALS
WEIGHT: 196 LBS | DIASTOLIC BLOOD PRESSURE: 70 MMHG | BODY MASS INDEX: 29.03 KG/M2 | HEART RATE: 82 BPM | SYSTOLIC BLOOD PRESSURE: 104 MMHG | HEIGHT: 69 IN

## 2023-01-04 DIAGNOSIS — M24.021 LOOSE BODY(IES), JOINT, ELBOW, RIGHT: ICD-10-CM

## 2023-01-04 DIAGNOSIS — Z98.890 HX OF ELBOW SURGERY: Primary | ICD-10-CM

## 2023-01-04 DIAGNOSIS — M25.529 ELBOW PAIN, UNSPECIFIED LATERALITY: ICD-10-CM

## 2023-01-04 PROCEDURE — 73080 X-RAY EXAM OF ELBOW: CPT | Mod: 26,RT,, | Performed by: RADIOLOGY

## 2023-01-04 PROCEDURE — 73080 XR ELBOW COMPLETE 3 VIEW RIGHT: ICD-10-PCS | Mod: 26,RT,, | Performed by: RADIOLOGY

## 2023-01-04 PROCEDURE — 73080 X-RAY EXAM OF ELBOW: CPT | Mod: TC,RT

## 2023-01-04 PROCEDURE — 99999 PR PBB SHADOW E&M-EST. PATIENT-LVL III: CPT | Mod: PBBFAC,,, | Performed by: ORTHOPAEDIC SURGERY

## 2023-01-04 PROCEDURE — 99213 OFFICE O/P EST LOW 20 MIN: CPT | Mod: PBBFAC | Performed by: ORTHOPAEDIC SURGERY

## 2023-01-04 PROCEDURE — 99024 PR POST-OP FOLLOW-UP VISIT: ICD-10-PCS | Mod: ,,, | Performed by: ORTHOPAEDIC SURGERY

## 2023-01-04 PROCEDURE — 99999 PR PBB SHADOW E&M-EST. PATIENT-LVL III: ICD-10-PCS | Mod: PBBFAC,,, | Performed by: ORTHOPAEDIC SURGERY

## 2023-01-04 PROCEDURE — 99024 POSTOP FOLLOW-UP VISIT: CPT | Mod: ,,, | Performed by: ORTHOPAEDIC SURGERY

## 2023-01-04 NOTE — PROGRESS NOTES
"POST-OPERATIVE EXAMINATION    62 y.o. Male who returns for follow after surgery. He is 6 weeks s/p    Procedure:   Right elbow arthroscopic extensive debridement  2.   Right elbow arthroscopic extensive synovectomy  3.   Right elbow arthroscopic multiple loose body removal  4.   Right shoulder arthroscopic capsular release     He is doing well without any issues.       PHYSICAL EXAMINATION:  /70   Pulse 82   Ht 5' 9" (1.753 m)   Wt 88.9 kg (196 lb)   BMI 28.94 kg/m²   General: Well-developed well-nourished 62 y.o. malein no acute distress   Cardiovascular: Regular rhythm   Lungs: No labored breathing or wheezing appreciated   Neuro: Alert and oriented ×3   Psychiatric: well oriented to person, place and time, demonstrates normal mood and affect   Skin: No rashes, lesions or ulcers, normal temperature, turgor, and texture on involved extremity    ORTHOPEDIC EXAM:  Normal post-operative swelling  Normal post-operative scarring  Strength: WNL  ROM: .  Full pronation and full supination.  Tests: None today    ASSESSMENT:      ICD-10-CM ICD-9-CM   1. Hx of elbow surgery  Z98.890 V15.29   2. Elbow pain, unspecified laterality  M25.529 719.42   3. Loose body(ies), joint, elbow, right  M24.021 718.12       PLAN:       Activity modifications given to patient today  RTC PRN              "

## 2023-08-01 ENCOUNTER — TELEPHONE (OUTPATIENT)
Dept: FAMILY MEDICINE | Facility: CLINIC | Age: 63
End: 2023-08-01
Payer: MEDICARE

## 2023-08-01 ENCOUNTER — PATIENT MESSAGE (OUTPATIENT)
Dept: FAMILY MEDICINE | Facility: CLINIC | Age: 63
End: 2023-08-01
Payer: MEDICARE

## 2023-08-01 ENCOUNTER — TELEPHONE (OUTPATIENT)
Dept: DERMATOLOGY | Facility: CLINIC | Age: 63
End: 2023-08-01
Payer: MEDICARE

## 2023-08-01 DIAGNOSIS — L98.9 SKIN LESION: Primary | ICD-10-CM

## 2023-08-01 NOTE — TELEPHONE ENCOUNTER
----- Message from Prasad Terrazas sent at 8/1/2023  9:50 AM CDT -----  Contact: Self  Type:  Sooner Appointment Request    Caller is requesting a sooner appointment.  Caller declined first available appointment listed below.  Caller will not accept being placed on the waitlist and is requesting a message be sent to doctor.    Name of Caller:  Spouse/Ranita  When is the first available appointment?  N/a  Symptoms:  Growth on face, cancer concern  Best Call Back Number:  [phone]  Additional Information:  Called to request pt take her appt. 8/10 at 9am MRN [id number]

## 2023-08-01 NOTE — TELEPHONE ENCOUNTER
----- Message from Prasad Terrazas sent at 8/1/2023  9:49 AM CDT -----  Contact: Spouse/Radha  Type: Needs Medical Advice  Who Called:  Spouse/Radha    Best Call Back Number: [phone]   Additional Information:  States pt has growth on face. Would like referral to Dr Monaco in [facility]

## 2023-08-04 ENCOUNTER — OFFICE VISIT (OUTPATIENT)
Dept: DERMATOLOGY | Facility: CLINIC | Age: 63
End: 2023-08-04
Payer: MEDICARE

## 2023-08-04 DIAGNOSIS — L82.0 SEBORRHEIC KERATOSES, INFLAMED: ICD-10-CM

## 2023-08-04 DIAGNOSIS — L73.8 SEBACEOUS HYPERPLASIA: Primary | ICD-10-CM

## 2023-08-04 DIAGNOSIS — L82.1 SEBORRHEIC KERATOSES: ICD-10-CM

## 2023-08-04 DIAGNOSIS — L57.0 ACTINIC KERATOSIS: ICD-10-CM

## 2023-08-04 PROCEDURE — 17003 DESTRUCTION, PREMALIGNANT LESIONS; SECOND THROUGH 14 LESIONS: ICD-10-PCS | Mod: S$GLB,,, | Performed by: STUDENT IN AN ORGANIZED HEALTH CARE EDUCATION/TRAINING PROGRAM

## 2023-08-04 PROCEDURE — 3288F PR FALLS RISK ASSESSMENT DOCUMENTED: ICD-10-PCS | Mod: CPTII,S$GLB,, | Performed by: STUDENT IN AN ORGANIZED HEALTH CARE EDUCATION/TRAINING PROGRAM

## 2023-08-04 PROCEDURE — 3288F FALL RISK ASSESSMENT DOCD: CPT | Mod: CPTII,S$GLB,, | Performed by: STUDENT IN AN ORGANIZED HEALTH CARE EDUCATION/TRAINING PROGRAM

## 2023-08-04 PROCEDURE — 1101F PR PT FALLS ASSESS DOC 0-1 FALLS W/OUT INJ PAST YR: ICD-10-PCS | Mod: CPTII,S$GLB,, | Performed by: STUDENT IN AN ORGANIZED HEALTH CARE EDUCATION/TRAINING PROGRAM

## 2023-08-04 PROCEDURE — 1160F PR REVIEW ALL MEDS BY PRESCRIBER/CLIN PHARMACIST DOCUMENTED: ICD-10-PCS | Mod: CPTII,S$GLB,, | Performed by: STUDENT IN AN ORGANIZED HEALTH CARE EDUCATION/TRAINING PROGRAM

## 2023-08-04 PROCEDURE — 99212 PR OFFICE/OUTPT VISIT, EST, LEVL II, 10-19 MIN: ICD-10-PCS | Mod: 25,S$GLB,, | Performed by: STUDENT IN AN ORGANIZED HEALTH CARE EDUCATION/TRAINING PROGRAM

## 2023-08-04 PROCEDURE — 17000 PR DESTRUCTION(LASER SURGERY,CRYOSURGERY,CHEMOSURGERY),PREMALIGNANT LESIONS,FIRST LESION: ICD-10-PCS | Mod: S$GLB,,, | Performed by: STUDENT IN AN ORGANIZED HEALTH CARE EDUCATION/TRAINING PROGRAM

## 2023-08-04 PROCEDURE — 17000 DESTRUCT PREMALG LESION: CPT | Mod: S$GLB,,, | Performed by: STUDENT IN AN ORGANIZED HEALTH CARE EDUCATION/TRAINING PROGRAM

## 2023-08-04 PROCEDURE — 1159F MED LIST DOCD IN RCRD: CPT | Mod: CPTII,S$GLB,, | Performed by: STUDENT IN AN ORGANIZED HEALTH CARE EDUCATION/TRAINING PROGRAM

## 2023-08-04 PROCEDURE — 1101F PT FALLS ASSESS-DOCD LE1/YR: CPT | Mod: CPTII,S$GLB,, | Performed by: STUDENT IN AN ORGANIZED HEALTH CARE EDUCATION/TRAINING PROGRAM

## 2023-08-04 PROCEDURE — 1159F PR MEDICATION LIST DOCUMENTED IN MEDICAL RECORD: ICD-10-PCS | Mod: CPTII,S$GLB,, | Performed by: STUDENT IN AN ORGANIZED HEALTH CARE EDUCATION/TRAINING PROGRAM

## 2023-08-04 PROCEDURE — 1160F RVW MEDS BY RX/DR IN RCRD: CPT | Mod: CPTII,S$GLB,, | Performed by: STUDENT IN AN ORGANIZED HEALTH CARE EDUCATION/TRAINING PROGRAM

## 2023-08-04 PROCEDURE — 17003 DESTRUCT PREMALG LES 2-14: CPT | Mod: S$GLB,,, | Performed by: STUDENT IN AN ORGANIZED HEALTH CARE EDUCATION/TRAINING PROGRAM

## 2023-08-04 PROCEDURE — 99212 OFFICE O/P EST SF 10 MIN: CPT | Mod: 25,S$GLB,, | Performed by: STUDENT IN AN ORGANIZED HEALTH CARE EDUCATION/TRAINING PROGRAM

## 2023-08-04 NOTE — PROGRESS NOTES
Subjective:      Patient ID:  Danny Chatman is a 63 y.o. male who presents for   Chief Complaint   Patient presents with    Spot     Right cheek     LOV 8/10/22    Patient here today for face check  Patient states he has a spot on right cheek x months. States it might be getting bigger.    Denies Phx of NMSC  Mother had MM        Review of Systems   Constitutional:  Negative for fever, chills and fatigue.   Respiratory:  Negative for cough and shortness of breath.    Gastrointestinal:  Negative for nausea and vomiting.   Skin:  Positive for activity-related sunscreen use and wears hat. Negative for daily sunscreen use.   Hematologic/Lymphatic: Does not bruise/bleed easily.       Objective:   Physical Exam   Constitutional: He appears well-developed and well-nourished.   Neurological: He is alert and oriented to person, place, and time.   Psychiatric: He has a normal mood and affect.   Skin:   Areas Examined (abnormalities noted in diagram):   Head / Face Inspection Performed            Diagram Legend     Erythematous scaling macule/papule c/w actinic keratosis       Vascular papule c/w angioma      Pigmented verrucoid papule/plaque c/w seborrheic keratosis      Yellow umbilicated papule c/w sebaceous hyperplasia      Irregularly shaped tan macule c/w lentigo     1-2 mm smooth white papules consistent with Milia      Movable subcutaneous cyst with punctum c/w epidermal inclusion cyst      Subcutaneous movable cyst c/w pilar cyst      Firm pink to brown papule c/w dermatofibroma      Pedunculated fleshy papule(s) c/w skin tag(s)      Evenly pigmented macule c/w junctional nevus     Mildly variegated pigmented, slightly irregular-bordered macule c/w mildly atypical nevus      Flesh colored to evenly pigmented papule c/w intradermal nevus       Pink pearly papule/plaque c/w basal cell carcinoma      Erythematous hyperkeratotic cursted plaque c/w SCC      Surgical scar with no sign of skin cancer recurrence       Open and closed comedones      Inflammatory papules and pustules      Verrucoid papule consistent consistent with wart     Erythematous eczematous patches and plaques     Dystrophic onycholytic nail with subungual debris c/w onychomycosis     Umbilicated papule    Erythematous-base heme-crusted tan verrucoid plaque consistent with inflamed seborrheic keratosis     Erythematous Silvery Scaling Plaque c/w Psoriasis     See annotation      Assessment / Plan:        Sebaceous hyperplasia  This is a common condition representing benign enlargement of the sebaceous lobule. It typically occurs in adulthood. Reassurance given to patient.     Actinic keratosis  -     Ambulatory referral/consult to Dermatology  Cryosurgery Procedure Note    Verbal consent from the patient is obtained and the patient is aware of the precancerous quality and need for treatment of these lesions. Liquid nitrogen cryosurgery is applied to the 2 actinic keratoses, as detailed in the physical exam, to produce a freeze injury. The patient is aware that blisters may form and is instructed on wound care with gentle cleansing and use of vaseline ointment to keep moist until healed. The patient is supplied a handout on cryosurgery and is instructed to call if lesions do not completely resolve.    Seborrheic keratoses  Seborrheic keratoses, inflamed  These are benign inherited growths without a malignant potential. Reassurance given to patient. No treatment is necessary.   Discussed risks and benefits of LN2         1 year  No follow-ups on file.

## 2023-08-04 NOTE — PATIENT INSTRUCTIONS

## 2023-08-10 ENCOUNTER — PATIENT MESSAGE (OUTPATIENT)
Dept: FAMILY MEDICINE | Facility: CLINIC | Age: 63
End: 2023-08-10
Payer: MEDICARE

## 2023-08-10 DIAGNOSIS — Z12.5 PROSTATE CANCER SCREENING: ICD-10-CM

## 2023-08-10 DIAGNOSIS — Z01.30 ENCOUNTER FOR EXAMINATION OF BLOOD PRESSURE WITHOUT ABNORMAL FINDINGS: ICD-10-CM

## 2023-08-10 DIAGNOSIS — Z00.00 HEALTHCARE MAINTENANCE: ICD-10-CM

## 2023-08-10 DIAGNOSIS — Z00.00 WELLNESS EXAMINATION: Primary | ICD-10-CM

## 2023-08-10 DIAGNOSIS — Z86.39 PERSONAL HISTORY OF OTHER ENDOCRINE, NUTRITIONAL AND METABOLIC DISEASE: ICD-10-CM

## 2023-08-10 DIAGNOSIS — Z13.1 SCREENING FOR DIABETES MELLITUS: ICD-10-CM

## 2023-08-10 DIAGNOSIS — Z13.6 ENCOUNTER FOR SCREENING FOR CARDIOVASCULAR DISORDERS: ICD-10-CM

## 2023-08-11 ENCOUNTER — OFFICE VISIT (OUTPATIENT)
Dept: FAMILY MEDICINE | Facility: CLINIC | Age: 63
End: 2023-08-11
Payer: MEDICARE

## 2023-08-11 ENCOUNTER — LAB VISIT (OUTPATIENT)
Dept: LAB | Facility: HOSPITAL | Age: 63
End: 2023-08-11
Attending: FAMILY MEDICINE
Payer: MEDICARE

## 2023-08-11 VITALS
HEART RATE: 73 BPM | RESPIRATION RATE: 18 BRPM | HEIGHT: 69 IN | TEMPERATURE: 98 F | BODY MASS INDEX: 28.18 KG/M2 | OXYGEN SATURATION: 96 % | WEIGHT: 190.25 LBS | DIASTOLIC BLOOD PRESSURE: 68 MMHG | SYSTOLIC BLOOD PRESSURE: 104 MMHG

## 2023-08-11 DIAGNOSIS — Z86.39 PERSONAL HISTORY OF OTHER ENDOCRINE, NUTRITIONAL AND METABOLIC DISEASE: ICD-10-CM

## 2023-08-11 DIAGNOSIS — Z13.6 ENCOUNTER FOR SCREENING FOR CARDIOVASCULAR DISORDERS: ICD-10-CM

## 2023-08-11 DIAGNOSIS — Z13.1 SCREENING FOR DIABETES MELLITUS: ICD-10-CM

## 2023-08-11 DIAGNOSIS — Z12.11 SCREENING FOR COLON CANCER: Primary | ICD-10-CM

## 2023-08-11 DIAGNOSIS — Z00.00 HEALTHCARE MAINTENANCE: ICD-10-CM

## 2023-08-11 DIAGNOSIS — Z12.5 PROSTATE CANCER SCREENING: ICD-10-CM

## 2023-08-11 DIAGNOSIS — Z00.00 WELLNESS EXAMINATION: ICD-10-CM

## 2023-08-11 DIAGNOSIS — Z01.30 ENCOUNTER FOR EXAMINATION OF BLOOD PRESSURE WITHOUT ABNORMAL FINDINGS: ICD-10-CM

## 2023-08-11 LAB
ALBUMIN SERPL BCP-MCNC: 4.2 G/DL (ref 3.5–5.2)
ALP SERPL-CCNC: 51 U/L (ref 55–135)
ALT SERPL W/O P-5'-P-CCNC: 23 U/L (ref 10–44)
ANION GAP SERPL CALC-SCNC: 10 MMOL/L (ref 8–16)
AST SERPL-CCNC: 24 U/L (ref 10–40)
BASOPHILS # BLD AUTO: 0.03 K/UL (ref 0–0.2)
BASOPHILS NFR BLD: 0.5 % (ref 0–1.9)
BILIRUB SERPL-MCNC: 0.6 MG/DL (ref 0.1–1)
BUN SERPL-MCNC: 23 MG/DL (ref 8–23)
CALCIUM SERPL-MCNC: 9.2 MG/DL (ref 8.7–10.5)
CHLORIDE SERPL-SCNC: 107 MMOL/L (ref 95–110)
CHOLEST SERPL-MCNC: 213 MG/DL (ref 120–199)
CHOLEST/HDLC SERPL: 4.4 {RATIO} (ref 2–5)
CO2 SERPL-SCNC: 25 MMOL/L (ref 23–29)
CREAT SERPL-MCNC: 1 MG/DL (ref 0.5–1.4)
DIFFERENTIAL METHOD: NORMAL
EOSINOPHIL # BLD AUTO: 0.2 K/UL (ref 0–0.5)
EOSINOPHIL NFR BLD: 2.7 % (ref 0–8)
ERYTHROCYTE [DISTWIDTH] IN BLOOD BY AUTOMATED COUNT: 12.8 % (ref 11.5–14.5)
EST. GFR  (NO RACE VARIABLE): >60 ML/MIN/1.73 M^2
GLUCOSE SERPL-MCNC: 95 MG/DL (ref 70–110)
HCT VFR BLD AUTO: 44.7 % (ref 40–54)
HDLC SERPL-MCNC: 48 MG/DL (ref 40–75)
HDLC SERPL: 22.5 % (ref 20–50)
HGB BLD-MCNC: 14.5 G/DL (ref 14–18)
IMM GRANULOCYTES # BLD AUTO: 0.02 K/UL (ref 0–0.04)
IMM GRANULOCYTES NFR BLD AUTO: 0.3 % (ref 0–0.5)
LDLC SERPL CALC-MCNC: 142.2 MG/DL (ref 63–159)
LYMPHOCYTES # BLD AUTO: 2.1 K/UL (ref 1–4.8)
LYMPHOCYTES NFR BLD: 33.9 % (ref 18–48)
MCH RBC QN AUTO: 29.9 PG (ref 27–31)
MCHC RBC AUTO-ENTMCNC: 32.4 G/DL (ref 32–36)
MCV RBC AUTO: 92 FL (ref 82–98)
MONOCYTES # BLD AUTO: 0.6 K/UL (ref 0.3–1)
MONOCYTES NFR BLD: 8.8 % (ref 4–15)
NEUTROPHILS # BLD AUTO: 3.4 K/UL (ref 1.8–7.7)
NEUTROPHILS NFR BLD: 53.8 % (ref 38–73)
NONHDLC SERPL-MCNC: 165 MG/DL
NRBC BLD-RTO: 0 /100 WBC
PLATELET # BLD AUTO: 267 K/UL (ref 150–450)
PMV BLD AUTO: 10.1 FL (ref 9.2–12.9)
POTASSIUM SERPL-SCNC: 4.1 MMOL/L (ref 3.5–5.1)
PROT SERPL-MCNC: 7.4 G/DL (ref 6–8.4)
RBC # BLD AUTO: 4.85 M/UL (ref 4.6–6.2)
SODIUM SERPL-SCNC: 142 MMOL/L (ref 136–145)
TRIGL SERPL-MCNC: 114 MG/DL (ref 30–150)
WBC # BLD AUTO: 6.28 K/UL (ref 3.9–12.7)

## 2023-08-11 PROCEDURE — 80053 COMPREHEN METABOLIC PANEL: CPT | Performed by: FAMILY MEDICINE

## 2023-08-11 PROCEDURE — 36415 COLL VENOUS BLD VENIPUNCTURE: CPT | Mod: PO | Performed by: FAMILY MEDICINE

## 2023-08-11 PROCEDURE — 84153 ASSAY OF PSA TOTAL: CPT | Performed by: FAMILY MEDICINE

## 2023-08-11 PROCEDURE — 99213 PR OFFICE/OUTPT VISIT, EST, LEVL III, 20-29 MIN: ICD-10-PCS | Mod: S$GLB,,, | Performed by: FAMILY MEDICINE

## 2023-08-11 PROCEDURE — 83036 HEMOGLOBIN GLYCOSYLATED A1C: CPT | Performed by: FAMILY MEDICINE

## 2023-08-11 PROCEDURE — 80061 LIPID PANEL: CPT | Performed by: FAMILY MEDICINE

## 2023-08-11 PROCEDURE — 99213 OFFICE O/P EST LOW 20 MIN: CPT | Mod: S$GLB,,, | Performed by: FAMILY MEDICINE

## 2023-08-11 PROCEDURE — 99999 PR PBB SHADOW E&M-EST. PATIENT-LVL IV: CPT | Mod: PBBFAC,,, | Performed by: FAMILY MEDICINE

## 2023-08-11 PROCEDURE — 85025 COMPLETE CBC W/AUTO DIFF WBC: CPT | Performed by: FAMILY MEDICINE

## 2023-08-11 PROCEDURE — 99999 PR PBB SHADOW E&M-EST. PATIENT-LVL IV: ICD-10-PCS | Mod: PBBFAC,,, | Performed by: FAMILY MEDICINE

## 2023-08-11 PROCEDURE — 99214 OFFICE O/P EST MOD 30 MIN: CPT | Mod: PBBFAC,PO | Performed by: FAMILY MEDICINE

## 2023-08-11 NOTE — PROGRESS NOTES
Subjective:   Patient ID: Danny Chatman is a 63 y.o. male     Chief Complaint:Annual Exam      Here for checkup      Review of Systems   Respiratory:  Negative for shortness of breath.    Cardiovascular:  Negative for chest pain.   Gastrointestinal:  Negative for abdominal pain.   Genitourinary:  Negative for dysuria.     Past Medical History:   Diagnosis Date    Cataract     Choroidal nevus of left eye 01/25/2018    Colon polyp     Hyperlipidemia      Past Surgical History:   Procedure Laterality Date    ANTERIOR CRUCIATE LIGAMENT REPAIR Right 1999    ARTHROSCOPY OF ELBOW Right 11/17/2022    Procedure: ARTHROSCOPY, ELBOW-Loose body removal/debridement;  Surgeon: Alie Madison MD;  Location: [facility] OR;  Service: Orthopedics;  Laterality: Right;    COLONOSCOPY N/A 12/6/2016    Procedure: COLONOSCOPY;  Surgeon: Lucho Vera MD;  Location: [facility] ENDO;  Service: Endoscopy;  Laterality: N/A;    FINGER FRACTURE SURGERY      R index    SYNOVECTOMY OF ELBOW Right 11/17/2022    Procedure: SYNOVECTOMY, ELBOW;  Surgeon: Alie Madison MD;  Location: [facility] OR;  Service: Orthopedics;  Laterality: Right;    VASECTOMY  1989     Objective:     Vitals:    08/11/23 0844   BP: 104/68   Pulse: 73   Resp: 18   Temp: 97.6 °F (36.4 °C)     Body mass index is 28.1 kg/m².  Physical Exam  Vitals and nursing note reviewed.   Constitutional:       Appearance: He is well-developed.   HENT:      Head: Normocephalic and atraumatic.   Eyes:      General: No scleral icterus.     Conjunctiva/sclera: Conjunctivae normal.   Cardiovascular:      Heart sounds: No murmur heard.  Pulmonary:      Effort: Pulmonary effort is normal. No respiratory distress.   Musculoskeletal:         General: No deformity. Normal range of motion.      Cervical back: Normal range of motion and neck supple.   Skin:     Coloration: Skin is not pale.      Findings: No rash.   Neurological:      Mental Status: He is alert and oriented to person, place, and time.    Psychiatric:         Behavior: Behavior normal.         Thought Content: Thought content normal.         Judgment: Judgment normal.       Assessment:     1. Screening for colon cancer      Plan:   Screening for colon cancer  -     Ambulatory referral/consult to Gastroenterology; Future; Expected date: 08/18/2023        Total time spent of Less than 30 minutes minutes on the day of the visit.This includes face to face time and preparing to see the patient, obtaining and reviewing separately obtained history, documenting clinical information in the electronic or other health record, independently interpreting results and communicating results to the patient/family/caregiver, or care coordinator.    Established patient with me has been instructed that must see me at least 1 time yearly (every 365 days) for refills of medications. Seeing other providers in this clinic is fine but expectation is to see me yearly.    Marvin Peñaloza MD  08/11/2023    Portions of this note have been dictated with MARIBEL Ribeiro

## 2023-08-11 NOTE — PATIENT INSTRUCTIONS
Al Delgado,     If you are due for any health screening(s) below please notify me so we can arrange them to be ordered and scheduled to maintain your health. Most healthy patients complete it. Don't lose out on improving your health.     Tests to Keep You Healthy    Colon Cancer Screening: DUE         Colon Cancer Screening    Of cancers affecting both men and women, colorectal cancer is the third leading cancer killer in the [place]. But it doesnt have to be. Screening can prevent colorectal cancer or find it at an early stage when treatment often leads to a cure.    A colonoscopy is the preferred test for detecting colon cancer. It is needed only once every 10 years if results are negative. While sedated, a flexible, lighted tube with a tiny camera is inserted into the rectum and advanced through the colon to look for cancers. An alternative screening test that is used at home and returned to the lab may also be used. It detects hidden blood in bowel movements which could indicate cancer in the colon. If results are positive, you will need a colonoscopy to determine if the blood is a sign of cancer. This type of follow up (diagnostic) colonoscopy usually requires additional copays as required by your insurance provider. Please contact your PCP if you have any questions.

## 2023-08-12 LAB
COMPLEXED PSA SERPL-MCNC: 1.9 NG/ML (ref 0–4)
ESTIMATED AVG GLUCOSE: 105 MG/DL (ref 68–131)
HBA1C MFR BLD: 5.3 % (ref 4–5.6)

## 2023-09-22 ENCOUNTER — TELEPHONE (OUTPATIENT)
Dept: GASTROENTEROLOGY | Facility: CLINIC | Age: 63
End: 2023-09-22
Payer: MEDICARE

## 2023-09-22 NOTE — TELEPHONE ENCOUNTER
----- Message from Nory Telles sent at 9/22/2023  1:24 PM CDT -----  Regarding: advised  Contact: pt  Type: Needs Medical Advice  Who Called:  Patient   Symptoms (please be specific):  erasmo authorization apt 9/27/23  How long has patient had these symptoms:    Pharmacy name and phone #:      Best Call Back Number: [phone]    Additional Information: Needs to know If it was approved thanks!

## 2023-09-27 ENCOUNTER — PATIENT MESSAGE (OUTPATIENT)
Dept: GASTROENTEROLOGY | Facility: CLINIC | Age: 63
End: 2023-09-27
Payer: MEDICARE

## 2023-09-27 DIAGNOSIS — Z86.010 HISTORY OF COLON POLYPS: Primary | ICD-10-CM

## 2023-10-13 ENCOUNTER — OFFICE VISIT (OUTPATIENT)
Dept: FAMILY MEDICINE | Facility: CLINIC | Age: 63
End: 2023-10-13
Payer: MEDICARE

## 2023-10-13 ENCOUNTER — TELEPHONE (OUTPATIENT)
Dept: FAMILY MEDICINE | Facility: CLINIC | Age: 63
End: 2023-10-13
Payer: MEDICARE

## 2023-10-13 VITALS
DIASTOLIC BLOOD PRESSURE: 70 MMHG | BODY MASS INDEX: 27.88 KG/M2 | SYSTOLIC BLOOD PRESSURE: 108 MMHG | HEIGHT: 69 IN | WEIGHT: 188.25 LBS | HEART RATE: 70 BPM | OXYGEN SATURATION: 95 % | RESPIRATION RATE: 16 BRPM
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
## 2023-10-13 PROCEDURE — 1101F PR PT FALLS ASSESS DOC 0-1 FALLS W/OUT INJ PAST YR: ICD-10-PCS | Mod: CPTII,S$GLB,, | Performed by: NURSE PRACTITIONER

## 2023-10-13 PROCEDURE — 1159F MED LIST DOCD IN RCRD: CPT | Mod: CPTII,S$GLB,, | Performed by: NURSE PRACTITIONER

## 2023-10-13 PROCEDURE — 3078F PR MOST RECENT DIASTOLIC BLOOD PRESSURE < 80 MM HG: ICD-10-PCS | Mod: CPTII,S$GLB,, | Performed by: NURSE PRACTITIONER

## 2023-10-13 PROCEDURE — 3288F FALL RISK ASSESSMENT DOCD: CPT | Mod: CPTII,S$GLB,, | Performed by: NURSE PRACTITIONER

## 2023-10-13 PROCEDURE — 99999 PR PBB SHADOW E&M-EST. PATIENT-LVL III: CPT | Mod: PBBFAC,,, | Performed by: NURSE PRACTITIONER

## 2023-10-13 PROCEDURE — 3078F DIAST BP <80 MM HG: CPT | Mod: CPTII,S$GLB,, | Performed by: NURSE PRACTITIONER

## 2023-10-13 PROCEDURE — 99213 OFFICE O/P EST LOW 20 MIN: CPT | Mod: S$GLB,,, | Performed by: NURSE PRACTITIONER

## 2023-10-13 PROCEDURE — 3044F HG A1C LEVEL LT 7.0%: CPT | Mod: CPTII,S$GLB,, | Performed by: NURSE PRACTITIONER

## 2023-10-13 PROCEDURE — 99213 PR OFFICE/OUTPT VISIT, EST, LEVL III, 20-29 MIN: ICD-10-PCS | Mod: S$GLB,,, | Performed by: NURSE PRACTITIONER

## 2023-10-13 PROCEDURE — 3074F PR MOST RECENT SYSTOLIC BLOOD PRESSURE < 130 MM HG: ICD-10-PCS | Mod: CPTII,S$GLB,, | Performed by: NURSE PRACTITIONER

## 2023-10-13 PROCEDURE — 3288F PR FALLS RISK ASSESSMENT DOCUMENTED: ICD-10-PCS | Mod: CPTII,S$GLB,, | Performed by: NURSE PRACTITIONER

## 2023-10-13 PROCEDURE — 3044F PR MOST RECENT HEMOGLOBIN A1C LEVEL <7.0%: ICD-10-PCS | Mod: CPTII,S$GLB,, | Performed by: NURSE PRACTITIONER

## 2023-10-13 PROCEDURE — 3074F SYST BP LT 130 MM HG: CPT | Mod: CPTII,S$GLB,, | Performed by: NURSE PRACTITIONER

## 2023-10-13 PROCEDURE — 3008F PR BODY MASS INDEX (BMI) DOCUMENTED: ICD-10-PCS | Mod: CPTII,S$GLB,, | Performed by: NURSE PRACTITIONER

## 2023-10-13 PROCEDURE — 1101F PT FALLS ASSESS-DOCD LE1/YR: CPT | Mod: CPTII,S$GLB,, | Performed by: NURSE PRACTITIONER

## 2023-10-13 PROCEDURE — 1159F PR MEDICATION LIST DOCUMENTED IN MEDICAL RECORD: ICD-10-PCS | Mod: CPTII,S$GLB,, | Performed by: NURSE PRACTITIONER

## 2023-10-13 PROCEDURE — 3008F BODY MASS INDEX DOCD: CPT | Mod: CPTII,S$GLB,, | Performed by: NURSE PRACTITIONER

## 2023-10-13 PROCEDURE — 99999 PR PBB SHADOW E&M-EST. PATIENT-LVL III: ICD-10-PCS | Mod: PBBFAC,,, | Performed by: NURSE PRACTITIONER

## 2023-10-13 NOTE — TELEPHONE ENCOUNTER
----- Message from Hortensia Coronel, Patient Care Assistant sent at 10/13/2023  7:25 AM CDT -----  Regarding: appointment  Contact: pt's veronica Garcia  Type:  Sooner Appointment Request    Caller is requesting a sooner appointment.  Caller declined first available appointment listed below.  Caller will not accept being placed on the waitlist and is requesting a message be sent to doctor.    Name of Caller:  pt's veronica Garcia     When is the first available appointment?  2023    Symptoms:  insect bite on right leg    Best Call Back Number:  [phone] (home)     Additional Information:  please call pt's wife Radha to advise. Thanks/1

## 2023-10-13 NOTE — PROGRESS NOTES
This dictation has been generated using Modal Fluency Dictation some phonetic errors may occur. Please contact author for clarification if needed.     Problem List Items Addressed This Visit    None  Visit Diagnoses       Bug bite, initial encounter    -  Primary                 Bug bite no evidence of spider bite.  No evidence of secondary cellulitis at this time.    Follow up if symptoms worsen or fail to improve.    ________________________________________________________________  ________________________________________________________________      No chief complaint on file.    History of present illness  This 63 y.o. presents today for complaint of bug bite occurred 1 day ago.  Patient notes severe scratching.  His wife was concerned about the bite and asked him to follow-up.  She is not present at the visit.   Unsure of what may have bit him.  Notes that it was very itchy.  Denies any pain today.  Review of systems    Patient denies any fever chills.   Past Medical History:   Diagnosis Date    Cataract     Choroidal nevus of left eye 01/25/2018    Colon polyp     Hyperlipidemia        Past Surgical History:   Procedure Laterality Date    ANTERIOR CRUCIATE LIGAMENT REPAIR Right 1999    ARTHROSCOPY OF ELBOW Right 11/17/2022    Procedure: ARTHROSCOPY, ELBOW-Loose body removal/debridement;  Surgeon: Alie Madison MD;  Location: [facility] OR;  Service: Orthopedics;  Laterality: Right;    COLONOSCOPY N/A 12/6/2016    Procedure: COLONOSCOPY;  Surgeon: Lucho Vera MD;  Location: [facility] ENDO;  Service: Endoscopy;  Laterality: N/A;    FINGER FRACTURE SURGERY      R index    SYNOVECTOMY OF ELBOW Right 11/17/2022    Procedure: SYNOVECTOMY, ELBOW;  Surgeon: Alie Madison MD;  Location: [facility] OR;  Service: Orthopedics;  Laterality: Right;    VASECTOMY  1989       Family History   Problem Relation Age of Onset    Skin cancer Mother     Cerebral aneurysm Mother     No Known Problems Maternal Grandmother     No Known  Problems Maternal Grandfather     No Known Problems Paternal Grandmother     No Known Problems Paternal Grandfather     Amblyopia Neg Hx     Blindness Neg Hx     Cancer Neg Hx     Cataracts Neg Hx     Glaucoma Neg Hx     Diabetes Neg Hx     Hypertension Neg Hx     Macular degeneration Neg Hx     Retinal detachment Neg Hx     Strabismus Neg Hx     Stroke Neg Hx     Thyroid disease Neg Hx     Melanoma Neg Hx     Psoriasis Neg Hx     Lupus Neg Hx     Eczema Neg Hx        Social History     Socioeconomic History    Marital status:    Tobacco Use    Smoking status: Never    Smokeless tobacco: Never   Substance and Sexual Activity    Alcohol use: Yes     Alcohol/week: 0.0 standard drinks of alcohol     Comment: rare    Drug use: No    Sexual activity: Yes     Partners: Female     Social Determinants of Health     Financial Resource Strain: Low Risk  (8/10/2023)    Overall Financial Resource Strain (CARDIA)     Difficulty of Paying Living Expenses: Not hard at all   Food Insecurity: No Food Insecurity (8/10/2023)    Hunger Vital Sign     Worried About Running Out of Food in the Last Year: Never true     Ran Out of Food in the Last Year: Never true   Transportation Needs: No Transportation Needs (8/10/2023)    PRAPARE - Transportation     Lack of Transportation (Medical): No     Lack of Transportation (Non-Medical): No   Physical Activity: Sufficiently Active (8/10/2023)    Exercise Vital Sign     Days of Exercise per Week: 5 days     Minutes of Exercise per Session: 40 min   Stress: No Stress Concern Present (8/10/2023)    [employer] [place] [employer] - Occupational Stress Questionnaire     Feeling of Stress : Not at all   Social Connections: Unknown (8/10/2023)    Social Connection and Isolation Panel [NHANES]     Frequency of Communication with Friends and Family: More than three times a week     Frequency of Social Gatherings with Friends and Family: Twice a week     Active Member of Clubs or  Organizations: Yes     Attends Club or Organization Meetings: 1 to 4 times per year     Marital Status:    Housing Stability: Low Risk  (8/10/2023)    Housing Stability Vital Sign     Unable to Pay for Housing in the Last Year: No     Number of Places Lived in the Last Year: 1     Unstable Housing in the Last Year: No       Current Outpatient Medications   Medication Sig Dispense Refill    MULTIVIT-MIN/FA/LYCOPEN/LUTEIN (CENTRUM SILVER ULTRA MEN'S ORAL) Take 1 tablet by mouth once daily.       No current facility-administered medications for this visit.       Review of patient's allergies indicates:  No Known Allergies    Physical examination  Vitals Reviewed\  Vitals:    10/13/23 1459   BP: 108/70   Pulse: 70   Resp: 16     Body mass index is 27.8 kg/m².   Gen. Well-dressed well-nourished   Skin warm dry and intact.  Rash local reaction to bug bite.  Excoriation noted.  Neuro. Awake alert oriented x4.  Normal judgment and cognition noted.  Extremities no clubbing cyanosis or edema noted.      Call or return to clinic prn if these symptoms worsen or fail to improve as anticipated.

## 2023-10-13 NOTE — PATIENT INSTRUCTIONS
Cortaid to affect area behind right knee.     Follow up for redness, swelling, drainage, bump, pain, or streaking.

## 2023-12-14 ENCOUNTER — ANESTHESIA EVENT (OUTPATIENT)
Dept: ENDOSCOPY | Facility: HOSPITAL | Age: 63
End: 2023-12-14
Payer: MEDICARE

## 2023-12-14 ENCOUNTER — HOSPITAL ENCOUNTER (OUTPATIENT)
Facility: HOSPITAL | Age: 63
Discharge: HOME | End: 2023-12-14
Attending: INTERNAL MEDICINE | Admitting: FAMILY MEDICINE
Payer: MEDICARE

## 2023-12-14 ENCOUNTER — ANESTHESIA (OUTPATIENT)
Dept: ENDOSCOPY | Facility: HOSPITAL | Age: 63
End: 2023-12-14
Payer: MEDICARE

## 2023-12-14 DIAGNOSIS — K57.90 DIVERTICULOSIS: ICD-10-CM

## 2023-12-14 DIAGNOSIS — Z86.010 HISTORY OF COLON POLYPS: ICD-10-CM

## 2023-12-14 DIAGNOSIS — K64.8 INTERNAL HEMORRHOIDS: Primary | ICD-10-CM

## 2023-12-14 PROCEDURE — D9220A PRA ANESTHESIA: Mod: CRNA,,, | Performed by: NURSE ANESTHETIST, CERTIFIED REGISTERED

## 2023-12-14 PROCEDURE — 25000003 PHARM REV CODE 250: Performed by: NURSE ANESTHETIST, CERTIFIED REGISTERED

## 2023-12-14 PROCEDURE — G0105 COLORECTAL SCRN; HI RISK IND: HCPCS | Mod: ,,, | Performed by: INTERNAL MEDICINE

## 2023-12-14 PROCEDURE — 63600175 PHARM REV CODE 636 W HCPCS: Performed by: NURSE ANESTHETIST, CERTIFIED REGISTERED

## 2023-12-14 PROCEDURE — 25000003 PHARM REV CODE 250: Performed by: INTERNAL MEDICINE

## 2023-12-14 PROCEDURE — G0105 COLORECTAL SCRN; HI RISK IND: HCPCS | Performed by: INTERNAL MEDICINE

## 2023-12-14 PROCEDURE — 37000008 HC ANESTHESIA 1ST 15 MINUTES: Performed by: INTERNAL MEDICINE

## 2023-12-14 PROCEDURE — D9220A PRA ANESTHESIA: ICD-10-PCS | Mod: CRNA,,, | Performed by: NURSE ANESTHETIST, CERTIFIED REGISTERED

## 2023-12-14 PROCEDURE — D9220A PRA ANESTHESIA: Mod: ANES,,, | Performed by: ANESTHESIOLOGY

## 2023-12-14 PROCEDURE — D9220A PRA ANESTHESIA: ICD-10-PCS | Mod: ANES,,, | Performed by: ANESTHESIOLOGY

## 2023-12-14 PROCEDURE — G0105 COLORECTAL SCRN; HI RISK IND: ICD-10-PCS | Mod: ,,, | Performed by: INTERNAL MEDICINE

## 2023-12-14 PROCEDURE — 37000009 HC ANESTHESIA EA ADD 15 MINS: Performed by: INTERNAL MEDICINE

## 2023-12-14 RX ORDER — LIDOCAINE HYDROCHLORIDE 10 MG/ML
INJECTION, SOLUTION EPIDURAL; INFILTRATION; INTRACAUDAL; PERINEURAL
Status: DISCONTINUED | OUTPATIENT
Start: 2023-12-14 | End: 2023-12-14

## 2023-12-14 RX ORDER — SODIUM CHLORIDE 9 MG/ML
INJECTION, SOLUTION INTRAVENOUS CONTINUOUS
Status: DISCONTINUED | OUTPATIENT
Start: 2023-12-14 | End: 2023-12-14 | Stop reason: HOSPADM

## 2023-12-14 RX ORDER — PROPOFOL 10 MG/ML
VIAL (ML) INTRAVENOUS
Status: DISCONTINUED | OUTPATIENT
Start: 2023-12-14 | End: 2023-12-14

## 2023-12-14 RX ADMIN — LIDOCAINE HYDROCHLORIDE 20 MG: 10 INJECTION, SOLUTION EPIDURAL; INFILTRATION; INTRACAUDAL; PERINEURAL at 08:12

## 2023-12-14 RX ADMIN — PROPOFOL 50 MG: 10 INJECTION, EMULSION INTRAVENOUS at 08:12

## 2023-12-14 RX ADMIN — PROPOFOL 50 MG: 10 INJECTION, EMULSION INTRAVENOUS at 09:12

## 2023-12-14 RX ADMIN — PROPOFOL 120 MG: 10 INJECTION, EMULSION INTRAVENOUS at 08:12

## 2023-12-14 RX ADMIN — SODIUM CHLORIDE: 9 INJECTION, SOLUTION INTRAVENOUS at 07:12

## 2023-12-14 NOTE — PROVATION PATIENT INSTRUCTIONS
Discharge Summary/Instructions after an Endoscopic Procedure  Patient Name: Danny Chatman  Patient MRN: [id number]  Patient YOB: 1960 Thursday, December 14, 2023  Perez Rodriges MD  Dear patient,  As a result of recent federal legislation (The Federal Cures Act), you may   receive lab or pathology results from your procedure in your [facility]   account before your physician is able to contact you. Your physician or   their representative will relay the results to you with their   recommendations at their soonest availability.  Thank you,  RESTRICTIONS:  During your procedure today, you received medications for sedation.  These   medications may affect your judgment, balance and coordination.  Therefore,   for 24 hours, you have the following restrictions:   - DO NOT drive a car, operate machinery, make legal/financial decisions,   sign important papers or drink alcohol.    ACTIVITY:  Today: no heavy lifting, straining or running due to procedural   sedation/anesthesia.  The following day: return to full activity including work.  DIET:  Eat and drink normally unless instructed otherwise.     TREATMENT FOR COMMON SIDE EFFECTS:  - Mild abdominal pain, nausea, belching, bloating or excessive gas:  rest,   eat lightly and use a heating pad.  - Sore Throat: treat with throat lozenges and/or gargle with warm salt   water.  - Because air was used during the procedure, expelling large amounts of air   from your rectum or belching is normal.  - If a bowel prep was taken, you may not have a bowel movement for 1-3 days.    This is normal.  SYMPTOMS TO WATCH FOR AND REPORT TO YOUR PHYSICIAN:  1. Abdominal pain or bloating, other than gas cramps.  2. Chest pain.  3. Back pain.  4. Signs of infection such as: chills or fever occurring within 24 hours   after the procedure.  5. Rectal bleeding, which would show as bright red, maroon, or black stools.   (A tablespoon of blood from the rectum is not serious, especially  if   hemorrhoids are present.)  6. Vomiting.  7. Weakness or dizziness.  GO DIRECTLY TO THE NEAREST EMERGENCY ROOM IF YOU HAVE ANY OF THE FOLLOWING:      Difficulty breathing              Chills and/or fever over 101 F   Persistent vomiting and/or vomiting blood   Severe abdominal pain   Severe chest pain   Black, tarry stools   Bleeding- more than one tablespoon   Any other symptom or condition that you feel may need urgent attention  Your doctor recommends these additional instructions:  If any biopsies were taken, your doctors clinic will contact you in 1 to 2   weeks with any results.  - Patient has a contact number available for emergencies.  The signs and   symptoms of potential delayed complications were discussed with the   patient.  Return to normal activities tomorrow.  Written discharge   instructions were provided to the patient.   - High fiber diet.   - Continue present medications.   - Repeat colonoscopy in 5 years for surveillance.   - Discharge patient to home (ambulatory).   - Return to GI office PRN.  For questions, problems or results please call your physician - Perez Rodriges MD at Work:  [phone].  [facility] EMERGENCY ROOM PHONE NUMBER: [phone]  IF A COMPLICATION OR EMERGENCY SITUATION ARISES AND YOU ARE UNABLE TO REACH   YOUR PHYSICIAN - GO DIRECTLY TO THE EMERGENCY ROOM.  Perez Rodriges MD  12/14/2023 9:09:40 AM  This report has been verified and signed electronically.  Dear patient,  As a result of recent federal legislation (The Federal Cures Act), you may   receive lab or pathology results from your procedure in your [facility]   account before your physician is able to contact you. Your physician or   their representative will relay the results to you with their   recommendations at their soonest availability.  Thank you,  PROVATION

## 2023-12-14 NOTE — TRANSFER OF CARE
"Anesthesia Transfer of Care Note    Patient: Danny Chatman    Procedure(s) Performed: Procedure(s) (LRB):  COLONOSCOPY (N/A)    Patient location: GI    Anesthesia Type: general    Transport from OR: Transported from OR on 2-3 L/min O2 by NC with adequate spontaneous ventilation    Post pain: adequate analgesia    Post assessment: no apparent anesthetic complications    Post vital signs: stable    Level of consciousness: awake    Nausea/Vomiting: no nausea/vomiting    Complications: none    Transfer of care protocol was followed      Last vitals: Visit Vitals  /77 (BP Location: Left arm, Patient Position: Lying)   Pulse 77   Temp 37 °C (98.6 °F) (Skin)   Resp 18   Ht 5' 9" (1.753 m)   Wt 83.9 kg (185 lb)   SpO2 95%   BMI 27.32 kg/m²     "

## 2023-12-14 NOTE — H&P
CC: History of colon polyps    63 year old male with above. States that symptoms are absent, no alleviating/exacerbating factors. No family history of colorectal CA. Positive personal history of polyps. No bleeding or weight loss.     ROS:  No headache, no fever/chills, no chest pain/SOB, no nausea/vomiting/diarrhea/constipation/GI bleeding/abdominal pain, no dysuria/hematuria.    VSSAF   Exam:   Alert and oriented x 3; no apparent distress   PERRLA, sclera anicteric  CV: Regular rate/rhythm, normal PMI   Lungs: Clear bilaterally with no wheeze/rales   Abdomen: Soft, NT/ND, normal bowel sounds   Ext: No cyanosis, clubbing     Impression:   As above    Plan:   Proceed with endoscopy. Further recs to follow.

## 2023-12-14 NOTE — ANESTHESIA PREPROCEDURE EVALUATION
12/14/2023  Danny Chatman is a 63 y.o., male.      Pre-op Assessment    I have reviewed the Patient Summary Reports.     I have reviewed the Nursing Notes. I have reviewed the NPO Status.   I have reviewed the Medications.     Review of Systems  Anesthesia Hx:  No problems with previous Anesthesia                Cardiovascular:  Cardiovascular Normal                                            Pulmonary:  Pulmonary Normal                       Musculoskeletal:  Arthritis               Neurological:  Neurology Normal                                          Physical Exam  General: Well nourished    Airway:  Mallampati: II   Mouth Opening: Normal  TM Distance: Normal  Neck ROM: Normal ROM        Anesthesia Plan  Type of Anesthesia, risks & benefits discussed:    Anesthesia Type: Gen Natural Airway  Intra-op Monitoring Plan: Standard ASA Monitors  Induction:  IV  Informed Consent: Informed consent signed with the Patient and all parties understand the risks and agree with anesthesia plan.  All questions answered.   ASA Score: 2    Ready For Surgery From Anesthesia Perspective.     .

## 2023-12-14 NOTE — ANESTHESIA POSTPROCEDURE EVALUATION
Anesthesia Post Evaluation    Patient: Danny Chatman    Procedure(s) Performed: Procedure(s) (LRB):  COLONOSCOPY (N/A)    Final Anesthesia Type: general      Patient location during evaluation: PACU  Patient participation: Yes- Able to Participate  Level of consciousness: awake and alert  Post-procedure vital signs: reviewed and stable  Pain management: adequate  Airway patency: patent    PONV status at discharge: No PONV  Anesthetic complications: no      Cardiovascular status: blood pressure returned to baseline  Respiratory status: unassisted  Hydration status: euvolemic  Follow-up not needed.              Vitals Value Taken Time   /78 12/14/23 0930   Temp 36.9 °C (98.5 °F) 12/14/23 0930   Pulse 75 12/14/23 0930   Resp 14 12/14/23 0930   SpO2 96 % 12/14/23 0930         Event Time   Out of Recovery 09:38:07         Pain/Joss Score: Joss Score: 10 (12/14/2023  9:30 AM)

## 2023-12-14 NOTE — PLAN OF CARE
Vss, zulay po fluids, denies pain, ambulates easily. IV removed, catheter intact. Discharge instructions provided and states understanding. States ready to go home.  Discharged from facility with family per wheelchair.

## 2023-12-15 VITALS
RESPIRATION RATE: 14 BRPM | HEIGHT: 69 IN | HEART RATE: 75 BPM | SYSTOLIC BLOOD PRESSURE: 120 MMHG | WEIGHT: 185 LBS | BODY MASS INDEX: 27.4 KG/M2 | TEMPERATURE: 99 F | DIASTOLIC BLOOD PRESSURE: 78 MMHG | OXYGEN SATURATION: 96 %

## 2023-12-28 NOTE — PROGRESS NOTES
HPI     Concerns About Ocular Health    Additional comments: Ocular health exam and nevus f/u           Comments   DLS: 11/14/16    Pt states no va complaints today. Did not bring glasses today. No floaters   or flashes.        Last edited by Cheryle Quintana on 1/25/2018  1:34 PM. (History)        ROS     Positive for: Eyes (orbital rim fracture OD - baseball)    Negative for: Neurological, Endocrine (denies DM), Cardiovascular (denies   HTN), Respiratory (denies asthma)    Last edited by Bethany Benjamin MD on 1/25/2018  2:41 PM.   (History)        Assessment /Plan     For exam results, see Encounter Report.    Encounter for eye exam    Choroidal nevus, left    Nuclear sclerosis, bilateral    Refractive error            Encounter for eye exam    Choroidal nevus, left - stable with baseline photos from 11/14/16 (repeated today 1/25/18 for some reason). Has overlying drusen, no fluid or orange pigment noted. F/u 1 year - optometry ok.    Nuclear sclerosis, bilateral - Not visually significant.  Observe.    Refractive error - MRx given                  Order signed

## 2024-04-15 ENCOUNTER — PATIENT MESSAGE (OUTPATIENT)
Dept: FAMILY MEDICINE | Facility: CLINIC | Age: 64
End: 2024-04-15
Payer: MEDICARE

## 2024-04-17 ENCOUNTER — OFFICE VISIT (OUTPATIENT)
Dept: UROLOGY | Facility: CLINIC | Age: 64
End: 2024-04-17
Payer: MEDICARE

## 2024-04-17 DIAGNOSIS — N50.89 SCROTAL SWELLING: ICD-10-CM

## 2024-04-17 DIAGNOSIS — N50.3 EPIDIDYMAL CYST: Primary | ICD-10-CM

## 2024-04-17 PROCEDURE — 99214 OFFICE O/P EST MOD 30 MIN: CPT | Mod: S$PBB,,, | Performed by: NURSE PRACTITIONER

## 2024-04-17 PROCEDURE — 99999 PR PBB SHADOW E&M-EST. PATIENT-LVL III: CPT | Mod: PBBFAC,,, | Performed by: NURSE PRACTITIONER

## 2024-04-17 NOTE — PROGRESS NOTES
"[facility] Note  Staff: JU Ponce-C    PCP: MD Jh    Chief Complaint: Enlarging Epididymal Cysts    Subjective:        HPI: Danny Chatman is a 63 y.o. male presents today for evaluation of Left epididymal cyst area getting increasingly larger at this time.  Pt states today his wife has noticed within the last few weeks the area of concern has increased in size.  Pt denies pain to area today.    No changes to his urination habits.    Pt was evaluated 1x by me on 10/25/21 for "Scrotal/Testicle Lumps".  Pt states he has had these for a while, but noticed they have gotten larger.  Pt recently moved back to the [facility].     Pt does admit to riding motorbikes which causing worsening of the scrotal/testicle swelling.     The pt was last evaluated by Dr. Cid-Urologist with [facility] in [place] on 8/3/2020 for prostate cancer screening.     Recent PSA level done on 2023 was 1.9 stable compared to past levels.     AUA SS last ov:  Pleased  Feeling of ICBE:0  Frequency:0  Intermittency:3  Urgency:0  Weak urine stream:1  Strainin  Nocturia:2  PVR:  14 mL     Hx of Kidney Stones?:  None  No Family Hx of  Cancers at this time.     Hx of Vasectomy in  in [employer].  Pt states ever since then has had problems.     HPI:   8/3/20: PSA reassuring.  Discussed his cord item again; feels it grew, worried.  Reviewed history in detail.   19: PSA same, no adverse changes except sometimes feels something in the cord where the vasectomy was done.  18: PSA unchanged, no new problems.  Reviewed history in detail.  Brother has prostate cancer, 5 yrs older; discussed.  17: Laws: 57 y/o M patient of Dr. Cid here for 6 month f/u of abnormal prostate exam. Reviewed PSA trend; normal numbers (1.2, 1.0). Has slow stream at times. Nocturia 1-2x. No abd/pelvic pain and no exac/rel factors.  No hematuria.  No urolithiasis.    17: 55 yo man referred by Dr." Dominguez after abnormal YVONNE.  No abd/pelvic pain and no exac/rel factors.  No hematuria.  No urolithiasis.  No urinary bother.  No  history.  Normal sexual function.     Allergies:  Patient has no known allergies.     Medications:  has a current medication list which includes the following prescription(s): multivit-min/fa/lycopen/lutein.     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    Colon polyp     Hyperlipidemia        PSHx:  Past Surgical History:   Procedure Laterality Date    ANTERIOR CRUCIATE LIGAMENT REPAIR Right 1999    ARTHROSCOPY OF ELBOW Right 11/17/2022    Procedure: ARTHROSCOPY, ELBOW-Loose body removal/debridement;  Surgeon: Alie Madison MD;  Location: [facility];  Service: Orthopedics;  Laterality: Right;    COLONOSCOPY N/A 12/6/2016    Procedure: COLONOSCOPY;  Surgeon: Lucho Vera MD;  Location: [facility];  Service: Endoscopy;  Laterality: N/A;    COLONOSCOPY N/A 12/14/2023    Procedure: COLONOSCOPY;  Surgeon: Perez Cotto MD;  Location: [facility];  Service: Endoscopy;  Laterality: N/A;    FINGER FRACTURE SURGERY      R index    SYNOVECTOMY OF ELBOW Right 11/17/2022    Procedure: SYNOVECTOMY, ELBOW;  Surgeon: Alie Madison MD;  Location: [facility];  Service: Orthopedics;  Laterality: Right;    VASECTOMY  1989       Allergies:  Patient has no known allergies.    Medications: reviewed     Objective:   There were no vitals filed for this visit.    General:WDWN in NAD  Eyes: PERRLA, normal conjunctiva  Respiratory: no increased work on breathing, clear to auscultation  Cardiovascular: regular rate and rhythm. No obvious extremity edema.  GI: palpation of masses. No tenderness. No hepatosplenomegaly to palpation.  Musculoskeletal: normal range of motion of bilateral upper extremities. Normal muscle strength and  tone.  Skin: no obvious rashes or lesions. No tightening of skin noted.  Neurologic: CN grossly normal. Normal sensation.   Psychiatric: awake, alert and oriented x 3. Mood and affect normal. Cooperative.     Exam performed by me during OV today:  *Golf ball size-Soft, movable and palpable area around Left side of epididymis.  No masses, no lesions observed.  No tenderness noted on exam.  No scrotal rashes  Testes normal and size, equal size bilaterally, no masses  Urethral meatus normal without discharge      Assessment:       1. Epididymal cyst    2. Scrotal swelling          Plan:     1.  Ultrasound of the Scrotum/Testicles to be repeated to compare to imaging in 2020 and 2021 at this time.    2.  The following instructions were given to the patient to assist with discomfort:  -Use jockstrap or the best supportive underwear he can get for relief of pressure.  -Alternate ice packs/heating pad to areas.  -Take OTC anti-inflammatories  -Sit in a warm tub of water greater than 15 minutes  -Elevate Scrotal Sac     F/u--We will contact pt after we receive and review imaging results and have MD review and make next poc.  Pt vu.      CATALINO Carlson

## 2024-04-19 ENCOUNTER — HOSPITAL ENCOUNTER (OUTPATIENT)
Dept: RADIOLOGY | Facility: HOSPITAL | Age: 64
Discharge: HOME | End: 2024-04-19
Attending: NURSE PRACTITIONER
Payer: MEDICARE

## 2024-04-19 DIAGNOSIS — N50.3 EPIDIDYMAL CYST: ICD-10-CM

## 2024-04-19 DIAGNOSIS — N50.89 SCROTAL SWELLING: ICD-10-CM

## 2024-04-19 PROCEDURE — 76870 US EXAM SCROTUM: CPT | Mod: TC

## 2024-04-21 ENCOUNTER — PATIENT MESSAGE (OUTPATIENT)
Dept: UROLOGY | Facility: CLINIC | Age: 64
End: 2024-04-21
Payer: MEDICARE

## 2024-04-23 ENCOUNTER — TELEPHONE (OUTPATIENT)
Dept: UROLOGY | Facility: CLINIC | Age: 64
End: 2024-04-23
Payer: MEDICARE

## 2024-04-24 ENCOUNTER — PATIENT MESSAGE (OUTPATIENT)
Dept: UROLOGY | Facility: CLINIC | Age: 64
End: 2024-04-24
Payer: MEDICARE

## 2024-04-25 ENCOUNTER — OFFICE VISIT (OUTPATIENT)
Dept: UROLOGY | Facility: CLINIC | Age: 64
End: 2024-04-25
Payer: MEDICARE

## 2024-04-25 VITALS
HEART RATE: 73 BPM | BODY MASS INDEX: 27.39 KG/M2 | DIASTOLIC BLOOD PRESSURE: 71 MMHG | SYSTOLIC BLOOD PRESSURE: 108 MMHG | WEIGHT: 184.94 LBS | HEIGHT: 69 IN

## 2024-04-25 DIAGNOSIS — N50.3 EPIDIDYMAL CYST: Primary | ICD-10-CM

## 2024-04-25 PROCEDURE — 99999 PR PBB SHADOW E&M-EST. PATIENT-LVL III: CPT | Mod: PBBFAC,,, | Performed by: STUDENT IN AN ORGANIZED HEALTH CARE EDUCATION/TRAINING PROGRAM

## 2024-04-25 PROCEDURE — 99213 OFFICE O/P EST LOW 20 MIN: CPT | Mod: PBBFAC,PO | Performed by: STUDENT IN AN ORGANIZED HEALTH CARE EDUCATION/TRAINING PROGRAM

## 2024-04-25 PROCEDURE — 99214 OFFICE O/P EST MOD 30 MIN: CPT | Mod: S$PBB,,, | Performed by: STUDENT IN AN ORGANIZED HEALTH CARE EDUCATION/TRAINING PROGRAM

## 2024-04-25 RX ORDER — CEFAZOLIN SODIUM 2 G/50ML
2 SOLUTION INTRAVENOUS
Status: CANCELLED | OUTPATIENT
Start: 2024-04-25

## 2024-04-25 NOTE — PROGRESS NOTES
[facility] Note    PCP: Marvin Peñaloza MD    Chief Complaint: spermatocele    SUBJECTIVE:       History of Present Illness:  Danny Chatman is a 63 y.o. male who presents to clinic for spermatocele. He is Established  to our clinic.     Noted swelling in the scrotum years ago and has been progressively worsening. It is becoming more and more bothersome to him. He is interested in having it removed.     Had a vasectomy 30 years ago and following this noted the cyst. Also had pain with erections.     No bothersome LUTS.     Scrotal US 4/19/24: A large extratesticular cystic mass located superiorly within the left hemiscrotum measures 5.4 by 3.5 x 4.2 cm and previously measured 4.2 x 2.3 x 3.2 cm. There are some mild low level internal echoes observed within the cyst without internal nodularity or thick walled septation. There is a 3-4 mm thinly septated right epididymal cyst at the level of the epididymal head. The right epididymis appears otherwise unremarkable. The left epididymis is poorly visualized.     Last urine culture: no documented UTIs    Lab Results   Component Value Date    CREATININE 1.0 08/11/2023     PSA, Screen (ng/mL)   Date Value   08/11/2023 1.9     Past medical, family, and social history reviewed as documented in chart with pertinent positive medical, family, and social history detailed in HPI.    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/2018    Colon polyp     Hyperlipidemia      Past Surgical History:   Procedure Laterality Date    ANTERIOR CRUCIATE LIGAMENT REPAIR Right 1999    ARTHROSCOPY OF ELBOW Right 11/17/2022    Procedure: ARTHROSCOPY, ELBOW-Loose body removal/debridement;  Surgeon: Alie Madison MD;  Location: [facility];  Service: Orthopedics;  Laterality: Right;    COLONOSCOPY N/A 12/6/2016    Procedure: COLONOSCOPY;  Surgeon: Lucho NEFF  "MD Jody;  Location: [facility] ENDO;  Service: Endoscopy;  Laterality: N/A;    COLONOSCOPY N/A 12/14/2023    Procedure: COLONOSCOPY;  Surgeon: Perez Cotto MD;  Location: [facility] ENDO;  Service: Endoscopy;  Laterality: N/A;    FINGER FRACTURE SURGERY      R index    SYNOVECTOMY OF ELBOW Right 11/17/2022    Procedure: SYNOVECTOMY, ELBOW;  Surgeon: Alie Madison MD;  Location: [facility] OR;  Service: Orthopedics;  Laterality: Right;    VASECTOMY  1989     Family History   Problem Relation Name Age of Onset    Skin cancer Mother      Cerebral aneurysm Mother      No Known Problems Maternal Grandmother      No Known Problems Maternal Grandfather      No Known Problems Paternal Grandmother      No Known Problems Paternal Grandfather      Amblyopia Neg Hx      Blindness Neg Hx      Cancer Neg Hx      Cataracts Neg Hx      Glaucoma Neg Hx      Diabetes Neg Hx      Hypertension Neg Hx      Macular degeneration Neg Hx      Retinal detachment Neg Hx      Strabismus Neg Hx      Stroke Neg Hx      Thyroid disease Neg Hx      Melanoma Neg Hx      Psoriasis Neg Hx      Lupus Neg Hx      Eczema Neg Hx       Social History     Tobacco Use    Smoking status: Never    Smokeless tobacco: Never   Substance Use Topics    Alcohol use: Yes     Alcohol/week: 0.0 standard drinks of alcohol     Comment: rare    Drug use: No        Review of Systems    OBJECTIVE:     Anticoagulation:  no    Estimated body mass index is 27.31 kg/m² as calculated from the following:    Height as of this encounter: 5' 9" (1.753 m).    Weight as of this encounter: 83.9 kg (184 lb 15.5 oz).    Vital Signs (Most Recent)  Pulse: 73 (04/25/24 0947)  BP: 108/71 (04/25/24 0947)    Physical Exam  Constitutional:       General: He is not in acute distress.     Appearance: Normal appearance. He is not ill-appearing.   HENT:      Head: Normocephalic and atraumatic.   Eyes:      General: No scleral icterus.  Pulmonary:      Effort: Pulmonary effort is normal. No respiratory " distress.   Abdominal:      General: There is no distension.   Genitourinary:     Penis: Normal.       Testes: Normal.         Right: Mass, tenderness or swelling not present.         Left: Mass, tenderness or swelling not present.      Comments: Above the left testicle is a large epididymal cyst/spermatocele. It is mobile and soft.   Skin:     Coloration: Skin is not jaundiced.   Neurological:      General: No focal deficit present.      Mental Status: He is alert and oriented to person, place, and time.   Psychiatric:         Mood and Affect: Mood normal.         Behavior: Behavior normal.         Thought Content: Thought content normal.         BMP  Lab Results   Component Value Date     08/11/2023    K 4.1 08/11/2023     08/11/2023    CO2 25 08/11/2023    BUN 23 08/11/2023    CREATININE 1.0 08/11/2023    CALCIUM 9.2 08/11/2023    ANIONGAP 10 08/11/2023    ESTGFRAFRICA >60.0 07/30/2021    EGFRNONAA >60.0 07/30/2021       Lab Results   Component Value Date    WBC 6.28 08/11/2023    HGB 14.5 08/11/2023    HCT 44.7 08/11/2023    MCV 92 08/11/2023     08/11/2023     Imaging:  Per HPI    ASSESSMENT     1. Epididymal cyst      PLAN:     - Discussed indications for surgery. He would like the spermatocele removed as it is causing him discomfort.   - I have explained the indication, risks, benefits, and alternatives of the procedure in detail. I discussed the rationale for the procedure and the typical management and expectations. I discussed the risks associated with the procedure. We discussed alternative management options. He voiced understanding following the discussion. He was given the opportunity to ask questions and all these questions were all answered to his satisfaction. He has elected to proceed as planned.   - Scheduled 5/17/24      Evelina Cano MD

## 2024-04-25 NOTE — H&P (VIEW-ONLY)
[facility] Note    PCP: Marvin Peñaloza MD    Chief Complaint: spermatocele    SUBJECTIVE:       History of Present Illness:  Danny Chatman is a 63 y.o. male who presents to clinic for spermatocele. He is Established  to our clinic.     Noted swelling in the scrotum years ago and has been progressively worsening. It is becoming more and more bothersome to him. He is interested in having it removed.     Had a vasectomy 30 years ago and following this noted the cyst. Also had pain with erections.     No bothersome LUTS.     Scrotal US 4/19/24: A large extratesticular cystic mass located superiorly within the left hemiscrotum measures 5.4 by 3.5 x 4.2 cm and previously measured 4.2 x 2.3 x 3.2 cm. There are some mild low level internal echoes observed within the cyst without internal nodularity or thick walled septation. There is a 3-4 mm thinly septated right epididymal cyst at the level of the epididymal head. The right epididymis appears otherwise unremarkable. The left epididymis is poorly visualized.     Last urine culture: no documented UTIs    Lab Results   Component Value Date    CREATININE 1.0 08/11/2023     PSA, Screen (ng/mL)   Date Value   08/11/2023 1.9     Past medical, family, and social history reviewed as documented in chart with pertinent positive medical, family, and social history detailed in HPI.    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/2018    Colon polyp     Hyperlipidemia      Past Surgical History:   Procedure Laterality Date    ANTERIOR CRUCIATE LIGAMENT REPAIR Right 1999    ARTHROSCOPY OF ELBOW Right 11/17/2022    Procedure: ARTHROSCOPY, ELBOW-Loose body removal/debridement;  Surgeon: Alie Madison MD;  Location: [facility];  Service: Orthopedics;  Laterality: Right;    COLONOSCOPY N/A 12/6/2016    Procedure: COLONOSCOPY;  Surgeon: Lucho NEFF  "MD Jody;  Location: [facility] ENDO;  Service: Endoscopy;  Laterality: N/A;    COLONOSCOPY N/A 12/14/2023    Procedure: COLONOSCOPY;  Surgeon: Perez Cotto MD;  Location: [facility] ENDO;  Service: Endoscopy;  Laterality: N/A;    FINGER FRACTURE SURGERY      R index    SYNOVECTOMY OF ELBOW Right 11/17/2022    Procedure: SYNOVECTOMY, ELBOW;  Surgeon: Alie Madison MD;  Location: [facility] OR;  Service: Orthopedics;  Laterality: Right;    VASECTOMY  1989     Family History   Problem Relation Name Age of Onset    Skin cancer Mother      Cerebral aneurysm Mother      No Known Problems Maternal Grandmother      No Known Problems Maternal Grandfather      No Known Problems Paternal Grandmother      No Known Problems Paternal Grandfather      Amblyopia Neg Hx      Blindness Neg Hx      Cancer Neg Hx      Cataracts Neg Hx      Glaucoma Neg Hx      Diabetes Neg Hx      Hypertension Neg Hx      Macular degeneration Neg Hx      Retinal detachment Neg Hx      Strabismus Neg Hx      Stroke Neg Hx      Thyroid disease Neg Hx      Melanoma Neg Hx      Psoriasis Neg Hx      Lupus Neg Hx      Eczema Neg Hx       Social History     Tobacco Use    Smoking status: Never    Smokeless tobacco: Never   Substance Use Topics    Alcohol use: Yes     Alcohol/week: 0.0 standard drinks of alcohol     Comment: rare    Drug use: No        Review of Systems    OBJECTIVE:     Anticoagulation:  no    Estimated body mass index is 27.31 kg/m² as calculated from the following:    Height as of this encounter: 5' 9" (1.753 m).    Weight as of this encounter: 83.9 kg (184 lb 15.5 oz).    Vital Signs (Most Recent)  Pulse: 73 (04/25/24 0947)  BP: 108/71 (04/25/24 0947)    Physical Exam  Constitutional:       General: He is not in acute distress.     Appearance: Normal appearance. He is not ill-appearing.   HENT:      Head: Normocephalic and atraumatic.   Eyes:      General: No scleral icterus.  Pulmonary:      Effort: Pulmonary effort is normal. No respiratory " distress.   Abdominal:      General: There is no distension.   Genitourinary:     Penis: Normal.       Testes: Normal.         Right: Mass, tenderness or swelling not present.         Left: Mass, tenderness or swelling not present.      Comments: Above the left testicle is a large epididymal cyst/spermatocele. It is mobile and soft.   Skin:     Coloration: Skin is not jaundiced.   Neurological:      General: No focal deficit present.      Mental Status: He is alert and oriented to person, place, and time.   Psychiatric:         Mood and Affect: Mood normal.         Behavior: Behavior normal.         Thought Content: Thought content normal.         BMP  Lab Results   Component Value Date     08/11/2023    K 4.1 08/11/2023     08/11/2023    CO2 25 08/11/2023    BUN 23 08/11/2023    CREATININE 1.0 08/11/2023    CALCIUM 9.2 08/11/2023    ANIONGAP 10 08/11/2023    ESTGFRAFRICA >60.0 07/30/2021    EGFRNONAA >60.0 07/30/2021       Lab Results   Component Value Date    WBC 6.28 08/11/2023    HGB 14.5 08/11/2023    HCT 44.7 08/11/2023    MCV 92 08/11/2023     08/11/2023     Imaging:  Per HPI    ASSESSMENT     1. Epididymal cyst      PLAN:     - Discussed indications for surgery. He would like the spermatocele removed as it is causing him discomfort.   - I have explained the indication, risks, benefits, and alternatives of the procedure in detail. I discussed the rationale for the procedure and the typical management and expectations. I discussed the risks associated with the procedure. We discussed alternative management options. He voiced understanding following the discussion. He was given the opportunity to ask questions and all these questions were all answered to his satisfaction. He has elected to proceed as planned.   - Scheduled 5/17/24      Evelina Cano MD

## 2024-04-25 NOTE — PROGRESS NOTES
Procedure Order to Urology [[id number]]    Electronically signed by: Evelina Cano MD on 04/25/24 1003 Status: Active   Ordering user: Evelina Cano MD 04/25/24 1003 Authorized by: Evelina Cano MD   Ordering mode: Standard   Frequency:  04/25/24 -     Diagnoses  Epididymal cyst [N50.3]   Questionnaire    Question Answer   Procedure Spermatocelectomy/Epididymal Cyst Comment - 5/17, left   Facility Name: [facility], start IV, General anesthesia , NPO please order CBC, BMP,EKG if over 40 , Ancef 2 gram ( alternative for  PCN allergy is Cipro 400 mg IV )

## 2024-05-02 ENCOUNTER — PATIENT MESSAGE (OUTPATIENT)
Dept: UROLOGY | Facility: CLINIC | Age: 64
End: 2024-05-02
Payer: MEDICARE

## 2024-05-10 ENCOUNTER — TELEPHONE (OUTPATIENT)
Dept: FAMILY MEDICINE | Facility: CLINIC | Age: 64
End: 2024-05-10
Payer: MEDICARE

## 2024-05-10 NOTE — TELEPHONE ENCOUNTER
Spoke to patient   Procedure on 5/17/2024 referral states authorized      ----- Message from Adilia Nieto sent at 5/10/2024 10:28 AM CDT -----  Contact: patient wife  Type:  Needs Medical Advice    Who Called: patient's wifeDoe     Would the patient rather a call back or a response via [facility]? Call     Best Call Back Number: [phone] (home)      Additional Information: patient's wifedoe would like to speak with the nurse in regards to questions about his upcoming procedure.     Please call to advise

## 2024-05-16 ENCOUNTER — ANESTHESIA EVENT (OUTPATIENT)
Dept: SURGERY | Facility: HOSPITAL | Age: 64
End: 2024-05-16
Payer: MEDICARE

## 2024-05-17 ENCOUNTER — HOSPITAL ENCOUNTER (OUTPATIENT)
Facility: HOSPITAL | Age: 64
Discharge: HOME | End: 2024-05-17
Attending: STUDENT IN AN ORGANIZED HEALTH CARE EDUCATION/TRAINING PROGRAM | Admitting: STUDENT IN AN ORGANIZED HEALTH CARE EDUCATION/TRAINING PROGRAM
Payer: MEDICARE

## 2024-05-17 ENCOUNTER — ANESTHESIA (OUTPATIENT)
Dept: SURGERY | Facility: HOSPITAL | Age: 64
End: 2024-05-17
Payer: MEDICARE

## 2024-05-17 DIAGNOSIS — N50.3 EPIDIDYMAL CYST: Primary | ICD-10-CM

## 2024-05-17 PROCEDURE — 88304 TISSUE EXAM BY PATHOLOGIST: CPT | Mod: TC | Performed by: PATHOLOGY

## 2024-05-17 PROCEDURE — 71000033 HC RECOVERY, INTIAL HOUR: Performed by: STUDENT IN AN ORGANIZED HEALTH CARE EDUCATION/TRAINING PROGRAM

## 2024-05-17 PROCEDURE — 63600175 PHARM REV CODE 636 W HCPCS: Performed by: STUDENT IN AN ORGANIZED HEALTH CARE EDUCATION/TRAINING PROGRAM

## 2024-05-17 PROCEDURE — 37000009 HC ANESTHESIA EA ADD 15 MINS: Performed by: STUDENT IN AN ORGANIZED HEALTH CARE EDUCATION/TRAINING PROGRAM

## 2024-05-17 PROCEDURE — 37000008 HC ANESTHESIA 1ST 15 MINUTES: Performed by: STUDENT IN AN ORGANIZED HEALTH CARE EDUCATION/TRAINING PROGRAM

## 2024-05-17 PROCEDURE — D9220A PRA ANESTHESIA: Mod: ANES,,, | Performed by: ANESTHESIOLOGY

## 2024-05-17 PROCEDURE — 36000707: Performed by: STUDENT IN AN ORGANIZED HEALTH CARE EDUCATION/TRAINING PROGRAM

## 2024-05-17 PROCEDURE — 25000003 PHARM REV CODE 250: Performed by: STUDENT IN AN ORGANIZED HEALTH CARE EDUCATION/TRAINING PROGRAM

## 2024-05-17 PROCEDURE — 25000003 PHARM REV CODE 250: Performed by: ANESTHESIOLOGY

## 2024-05-17 PROCEDURE — D9220A PRA ANESTHESIA: Mod: CRNA,,, | Performed by: STUDENT IN AN ORGANIZED HEALTH CARE EDUCATION/TRAINING PROGRAM

## 2024-05-17 PROCEDURE — 71000015 HC POSTOP RECOV 1ST HR: Performed by: STUDENT IN AN ORGANIZED HEALTH CARE EDUCATION/TRAINING PROGRAM

## 2024-05-17 PROCEDURE — 36000706: Performed by: STUDENT IN AN ORGANIZED HEALTH CARE EDUCATION/TRAINING PROGRAM

## 2024-05-17 PROCEDURE — 71000039 HC RECOVERY, EACH ADD'L HOUR: Performed by: STUDENT IN AN ORGANIZED HEALTH CARE EDUCATION/TRAINING PROGRAM

## 2024-05-17 PROCEDURE — 54840 REMOVE EPIDIDYMIS LESION: CPT | Mod: LT,,, | Performed by: STUDENT IN AN ORGANIZED HEALTH CARE EDUCATION/TRAINING PROGRAM

## 2024-05-17 PROCEDURE — 71000016 HC POSTOP RECOV ADDL HR: Performed by: STUDENT IN AN ORGANIZED HEALTH CARE EDUCATION/TRAINING PROGRAM

## 2024-05-17 PROCEDURE — 94799 UNLISTED PULMONARY SVC/PX: CPT

## 2024-05-17 RX ORDER — MIDAZOLAM HYDROCHLORIDE 1 MG/ML
INJECTION INTRAMUSCULAR; INTRAVENOUS
Status: DISCONTINUED | OUTPATIENT
Start: 2024-05-17 | End: 2024-05-17

## 2024-05-17 RX ORDER — METOCLOPRAMIDE HYDROCHLORIDE 5 MG/ML
10 INJECTION INTRAMUSCULAR; INTRAVENOUS EVERY 10 MIN PRN
Status: ACTIVE | OUTPATIENT
Start: 2024-05-17

## 2024-05-17 RX ORDER — ACETAMINOPHEN 10 MG/ML
INJECTION, SOLUTION INTRAVENOUS
Status: DISCONTINUED | OUTPATIENT
Start: 2024-05-17 | End: 2024-05-17

## 2024-05-17 RX ORDER — PROPOFOL 10 MG/ML
VIAL (ML) INTRAVENOUS
Status: DISCONTINUED | OUTPATIENT
Start: 2024-05-17 | End: 2024-05-17

## 2024-05-17 RX ORDER — ONDANSETRON HYDROCHLORIDE 2 MG/ML
INJECTION, SOLUTION INTRAVENOUS
Status: DISCONTINUED | OUTPATIENT
Start: 2024-05-17 | End: 2024-05-17

## 2024-05-17 RX ORDER — ONDANSETRON 4 MG/1
8 TABLET, ORALLY DISINTEGRATING ORAL EVERY 8 HOURS PRN
Status: DISCONTINUED | OUTPATIENT
Start: 2024-05-17 | End: 2024-05-17 | Stop reason: HOSPADM

## 2024-05-17 RX ORDER — FENTANYL CITRATE 50 UG/ML
25 INJECTION, SOLUTION INTRAMUSCULAR; INTRAVENOUS EVERY 5 MIN PRN
Status: ACTIVE | OUTPATIENT
Start: 2024-05-17

## 2024-05-17 RX ORDER — LIDOCAINE HYDROCHLORIDE 20 MG/ML
INJECTION INTRAVENOUS
Status: DISCONTINUED | OUTPATIENT
Start: 2024-05-17 | End: 2024-05-17

## 2024-05-17 RX ORDER — OXYCODONE AND ACETAMINOPHEN 5; 325 MG/1; MG/1
1 TABLET ORAL EVERY 4 HOURS PRN
Qty: 5 TABLET | Refills: 0 | Status: SHIPPED | OUTPATIENT
Start: 2024-05-17

## 2024-05-17 RX ORDER — FENTANYL CITRATE 50 UG/ML
INJECTION, SOLUTION INTRAMUSCULAR; INTRAVENOUS
Status: DISCONTINUED | OUTPATIENT
Start: 2024-05-17 | End: 2024-05-17

## 2024-05-17 RX ORDER — KETOROLAC TROMETHAMINE 30 MG/ML
INJECTION, SOLUTION INTRAMUSCULAR; INTRAVENOUS
Status: DISCONTINUED | OUTPATIENT
Start: 2024-05-17 | End: 2024-05-17

## 2024-05-17 RX ORDER — DEXAMETHASONE SODIUM PHOSPHATE 4 MG/ML
INJECTION, SOLUTION INTRA-ARTICULAR; INTRALESIONAL; INTRAMUSCULAR; INTRAVENOUS; SOFT TISSUE
Status: DISCONTINUED | OUTPATIENT
Start: 2024-05-17 | End: 2024-05-17

## 2024-05-17 RX ORDER — OXYCODONE HYDROCHLORIDE 5 MG/1
5 TABLET ORAL ONCE AS NEEDED
Status: COMPLETED | OUTPATIENT
Start: 2024-05-17 | End: 2024-05-17

## 2024-05-17 RX ORDER — HYDROCODONE BITARTRATE AND ACETAMINOPHEN 5; 325 MG/1; MG/1
1 TABLET ORAL EVERY 4 HOURS PRN
Status: DISCONTINUED | OUTPATIENT
Start: 2024-05-17 | End: 2024-05-17 | Stop reason: HOSPADM

## 2024-05-17 RX ORDER — LIDOCAINE HYDROCHLORIDE 10 MG/ML
1 INJECTION, SOLUTION EPIDURAL; INFILTRATION; INTRACAUDAL; PERINEURAL ONCE
Status: ACTIVE | OUTPATIENT
Start: 2024-05-17

## 2024-05-17 RX ORDER — SODIUM CHLORIDE, SODIUM LACTATE, POTASSIUM CHLORIDE, CALCIUM CHLORIDE 600; 310; 30; 20 MG/100ML; MG/100ML; MG/100ML; MG/100ML
INJECTION, SOLUTION INTRAVENOUS CONTINUOUS
Status: ACTIVE | OUTPATIENT
Start: 2024-05-17

## 2024-05-17 RX ADMIN — MIDAZOLAM HYDROCHLORIDE 2 MG: 1 INJECTION INTRAMUSCULAR; INTRAVENOUS at 11:05

## 2024-05-17 RX ADMIN — SODIUM CHLORIDE, SODIUM GLUCONATE, SODIUM ACETATE, POTASSIUM CHLORIDE AND MAGNESIUM CHLORIDE: 526; 502; 368; 37; 30 INJECTION, SOLUTION INTRAVENOUS at 12:05

## 2024-05-17 RX ADMIN — SODIUM CHLORIDE, SODIUM GLUCONATE, SODIUM ACETATE, POTASSIUM CHLORIDE AND MAGNESIUM CHLORIDE: 526; 502; 368; 37; 30 INJECTION, SOLUTION INTRAVENOUS at 09:05

## 2024-05-17 RX ADMIN — FENTANYL CITRATE 25 MCG: 50 INJECTION, SOLUTION INTRAMUSCULAR; INTRAVENOUS at 12:05

## 2024-05-17 RX ADMIN — DEXAMETHASONE SODIUM PHOSPHATE 4 MG: 4 INJECTION, SOLUTION INTRA-ARTICULAR; INTRALESIONAL; INTRAMUSCULAR; INTRAVENOUS; SOFT TISSUE at 11:05

## 2024-05-17 RX ADMIN — CEFAZOLIN 2 G: 2 INJECTION, POWDER, FOR SOLUTION INTRAMUSCULAR; INTRAVENOUS at 11:05

## 2024-05-17 RX ADMIN — ACETAMINOPHEN 1000 MG: 10 INJECTION, SOLUTION INTRAVENOUS at 11:05

## 2024-05-17 RX ADMIN — OXYCODONE 5 MG: 5 TABLET ORAL at 01:05

## 2024-05-17 RX ADMIN — PROPOFOL 150 MG: 10 INJECTION, EMULSION INTRAVENOUS at 11:05

## 2024-05-17 RX ADMIN — KETOROLAC TROMETHAMINE 30 MG: 30 INJECTION, SOLUTION INTRAMUSCULAR; INTRAVENOUS at 12:05

## 2024-05-17 RX ADMIN — ONDANSETRON 4 MG: 2 INJECTION INTRAMUSCULAR; INTRAVENOUS at 11:05

## 2024-05-17 RX ADMIN — FENTANYL CITRATE 25 MCG: 50 INJECTION, SOLUTION INTRAMUSCULAR; INTRAVENOUS at 11:05

## 2024-05-17 RX ADMIN — LIDOCAINE HYDROCHLORIDE 100 MG: 20 INJECTION, SOLUTION INTRAVENOUS at 11:05

## 2024-05-17 NOTE — PLAN OF CARE
Patient prepared for procedure.  No questions at this time.  Family set up for text messaging.   Belongings placed in preop cabinet.

## 2024-05-17 NOTE — DISCHARGE SUMMARY
[facility]  Discharge Note  Short Stay    Admit Date: 5/17/2024    Discharge Date and Time: 05/17/2024 12:33 PM      Attending Physician: Evelina Cano MD     Discharge Provider: Evelina Cano    Diagnoses:  Active Hospital Problems    Diagnosis  POA    *Epididymal cyst [N50.3]  Yes      Resolved Hospital Problems   No resolved problems to display.       Discharged Condition: good    Hospital Course: Patient was admitted for outpatient procedure and tolerated the procedure well with no complications. The patient was discharged home in good condition on the same day.       Final Diagnoses: Same as principal problem.    Disposition: Home or Self Care    Follow up/Patient Instructions:    Medications:  Reconciled Home Medications:   Current Discharge Medication List        START taking these medications    Details   oxyCODONE-acetaminophen (PERCOCET) 5-325 mg per tablet Take 1 tablet by mouth every 4 (four) hours as needed for Pain.  Qty: 5 tablet, Refills: 0    Comments: Quantity prescribed more than 7 day supply? No           CONTINUE these medications which have NOT CHANGED    Details   MULTIVIT-MIN/FA/LYCOPEN/LUTEIN (CENTRUM SILVER ULTRA MEN'S ORAL) Take 1 tablet by mouth once daily.           Discharge Procedure Orders   Diet general     Call MD for:  temperature >100.4     Call MD for:  persistent nausea and vomiting     Call MD for:  severe uncontrolled pain     No dressing needed      Follow-up Information       Evelina Cano MD Follow up in 6 week(s).    Specialty: Urology  Why: assess symptoms  Contact information:  [address] [place] [address] [address]IVE  [address]  [facility] [address]  [phone]                           Evelina Cano MD  Urology Department

## 2024-05-17 NOTE — ANESTHESIA PREPROCEDURE EVALUATION
05/17/2024  Danny Chatman is a 63 y.o., male.      Pre-op Assessment    I have reviewed the Patient Summary Reports.     I have reviewed the Nursing Notes. I have reviewed the NPO Status.   I have reviewed the Medications.     Review of Systems  Anesthesia Hx:  No problems with previous Anesthesia                Cardiovascular:  Cardiovascular Normal                                            Pulmonary:  Pulmonary Normal                       Musculoskeletal:  Arthritis               Neurological:  Neurology Normal                                          Physical Exam  General: Well nourished    Airway:  Mallampati: II   Mouth Opening: Normal  TM Distance: Normal  Neck ROM: Normal ROM        Anesthesia Plan  Type of Anesthesia, risks & benefits discussed:    Anesthesia Type: Gen Supraglottic Airway  Intra-op Monitoring Plan: Standard ASA Monitors  Post Op Pain Control Plan: multimodal analgesia  Induction:  IV  Informed Consent: Informed consent signed with the Patient and all parties understand the risks and agree with anesthesia plan.  All questions answered.   ASA Score: 2    Ready For Surgery From Anesthesia Perspective.     .

## 2024-05-17 NOTE — TRANSFER OF CARE
"Anesthesia Transfer of Care Note    Patient: Danny Chatman    Procedure(s) Performed: Procedure(s) (LRB):  EXCISION, SPERMATOCELE (Left)    Patient location: PACU    Anesthesia Type: general    Transport from OR: Transported from OR on 6-10 L/min O2 by face mask with adequate spontaneous ventilation    Post pain: adequate analgesia    Post assessment: no apparent anesthetic complications and tolerated procedure well    Post vital signs: stable    Level of consciousness: sedated and responds to stimulation    Nausea/Vomiting: no nausea/vomiting    Complications: none    Transfer of care protocol was followed      Last vitals: Visit Vitals  /70 (BP Location: Right arm, Patient Position: Lying)   Pulse 60   Temp 36.6 °C (97.8 °F) (Skin)   Resp 20   Ht 5' 9" (1.753 m)   Wt 83.5 kg (184 lb 1.4 oz)   SpO2 97%   BMI 27.18 kg/m²     "

## 2024-05-17 NOTE — ANESTHESIA PROCEDURE NOTES
Intubation    Date/Time: 5/17/2024 11:54 AM    Performed by: Sonny Tellez CRNA  Authorized by: Sonny Tellez CRNA    Intubation:     Induction:  Intravenous    Intubated:  Postinduction    Mask Ventilation:  Not attempted    Attempts:  1    Attempted By:  NANCI (Sonny Tellez)    Difficult Airway Encountered?: No      Complications:  None    Airway Device:  Supraglottic airway/LMA    Airway Device Size:  4.0    Style/Cuff Inflation:  Cuffed    Secured at:  The lips    Placement Verified By:  Capnometry    Complicating Factors:  None    Findings Post-Intubation:  BS equal bilateral and atraumatic/condition of teeth unchanged

## 2024-05-17 NOTE — DISCHARGE INSTRUCTIONS
Post Scrotal Surgery Instructions  Do not strain to have a bowel movement  No strenuous exercise x 7 days  No driving while you are on narcotic pain medications     You can expect:  Some swelling in your scrotum but significant swelling or pain should be evaluated by an MD or ER  Swelling should resolve in the next few months    You can shower in 24 hours    You can place ice on your scrotum for 30 min to 1 hour at a time for swelling  You can also elevate your scrotum under towels to help with swelling when you are sitting    Post op instructions for prevention of DVT  What is deep vein thrombosis?  Deep vein thrombosis (DVT) is the medical term for blood clots in the deep veins of the leg.  These blood clots can be dangerous.  A DVT can block a blood vessel and keep blood from getting where it needs to go.  Another problem is that the clot can travel to other parts of the body such as the lungs.  A clot that travels to the lungs is called a pulmonary embolus (PE) and can cause serious problems with breathing which can lead to death.  Am I at risk for DVT/PE?  If you are not very active, you are at risk of DVT.  Anyone confined to bed, sitting for long periods of time, recovering from surgery, etc. increases the risk of DVT.  Other risk factors are cancer diagnosis, certain medications, estrogen replacement in any form,older age, obesity, pregnancy, smoking, history of clotting disorders, and dehydration.  How will I know if I have a DVT?  Swelling in the lower leg  Pain  Warmth, redness, hardness or bulging of the vein  If you have any of these symptoms, call your doctors office right away.  Some people will not have any symptoms until the clot moves to the lungs.  What are the symptoms of a PE?  Panting, shortness of breath, or trouble breathing  Sharp, knife-like chest pain when you breathe  Coughing or coughing up blood  Rapid heartbeat  If you have any of these symptoms or get worse quickly, call 911 for  emergency treatment.  How can I prevent a DVT?  Avoid long periods of inactivity and dont cross your legs--get up and walk around every hour or so.  Stay active--walking after surgery is highly encouraged.  This means you should get out of the house and walk in the neighborhood.  Going up and down stairs will not impair healing (unless advised against such activity by your doctor).    Drink plenty of noncaffeinated, nonalcoholic fluids each day to prevent dehydration.  Wear special support stockings, if they have been advised by your doctor.  If you travel, stop at least once an hour and walk around.  Avoid smoking (assistance with stopping is available through your healthcare provider)  Always notify your doctor if you are not able to follow the post operative instructions that are given to you at the time of discharge.  It may be necessary to prescribe one of the medications available to prevent DVT.Discharge Instructions: After Your Surgery/Procedure  Youve just had surgery. During surgery you were given medicine called anesthesia to keep you relaxed and free of pain. After surgery you may have some pain or nausea. This is common. Here are some tips for feeling better and getting well after surgery.     Stay on schedule with your medication.   Going home  Your doctor or nurse will show you how to take care of yourself when you go home. He or she will also answer your questions. Have an adult family member or friend drive you home.      For your safety we recommend these precaution for the first 24 hours after your procedure:  Do not drive or use heavy equipment.  Do not make important decisions or sign legal papers.  Do not drink alcohol.  Have someone stay with you, if needed. He or she can watch for problems and help keep you safe.  Your concentration, balance, coordination, and judgement may be impaired for many hours after anesthesia.  Use caution when ambulating or standing up.     You may feel weak and  ""washed out" after anesthesia and surgery.      Subtle residual effects of general anesthesia or sedation with regional / local anesthesia can last more than 24 hours.  Rest for the remainder of the day or longer if your Doctor/Surgeon has advised you to do so.  Although you may feel normal within the first 24 hours, your reflexes and mental ability may be impaired without you realizing it.  You may feel dizzy, lightheaded or sleepy for 24 hours or longer.      Be sure to go to all follow-up visits with your doctor. And rest after your surgery for as long as your doctor tells you to.  Coping with pain  If you have pain after surgery, pain medicine will help you feel better. Take it as told, before pain becomes severe. Also, ask your doctor or pharmacist about other ways to control pain. This might be with heat, ice, or relaxation. And follow any other instructions your surgeon or nurse gives you.  Tips for taking pain medicine  To get the best relief possible, remember these points:  Pain medicines can upset your stomach. Taking them with a little food may help.  Most pain relievers taken by mouth need at least 20 to 30 minutes to start to work.  Taking medicine on a schedule can help you remember to take it. Try to time your medicine so that you can take it before starting an activity. This might be before you get dressed, go for a walk, or sit down for dinner.  Constipation is a common side effect of pain medicines. Call your doctor before taking any medicines such as laxatives or stool softeners to help ease constipation. Also ask if you should skip any foods. Drinking lots of fluids and eating foods such as fruits and vegetables that are high in fiber can also help. Remember, do not take laxatives unless your surgeon has prescribed them.  Drinking alcohol and taking pain medicine can cause dizziness and slow your breathing. It can even be deadly. Do not drink alcohol while taking pain medicine.  Pain medicine " can make you react more slowly to things. Do not drive or run machinery while taking pain medicine.  Your health care provider may tell you to take acetaminophen to help ease your pain. Ask him or her how much you are supposed to take each day. Acetaminophen or other pain relievers may interact with your prescription medicines or other over-the-counter (OTC) drugs. Some prescription medicines have acetaminophen and other ingredients. Using both prescription and OTC acetaminophen for pain can cause you to overdose. Read the labels on your OTC medicines with care. This will help you to clearly know the list of ingredients, how much to take, and any warnings. It may also help you not take too much acetaminophen. If you have questions or do not understand the information, ask your pharmacist or health care provider to explain it to you before you take the OTC medicine.  Managing nausea  Some people have an upset stomach after surgery. This is often because of anesthesia, pain, or pain medicine, or the stress of surgery. These tips will help you handle nausea and eat healthy foods as you get better. If you were on a special food plan before surgery, ask your doctor if you should follow it while you get better. These tips may help:  Do not push yourself to eat. Your body will tell you when to eat and how much.  Start off with clear liquids and soup. They are easier to digest.  Next try semi-solid foods, such as mashed potatoes, applesauce, and gelatin, as you feel ready.  Slowly move to solid foods. Dont eat fatty, rich, or spicy foods at first.  Do not force yourself to have 3 large meals a day. Instead eat smaller amounts more often.  Take pain medicines with a small amount of solid food, such as crackers or toast, to avoid nausea.     Call your surgeon if  You still have pain an hour after taking medicine. The medicine may not be strong enough.  You feel too sleepy, dizzy, or groggy. The medicine may be too  strong.  You have side effects like nausea, vomiting, or skin changes, such as rash, itching, or hives.       If you have obstructive sleep apnea  You were given anesthesia medicine during surgery to keep you comfortable and free of pain. After surgery, you may have more apnea spells because of this medicine and other medicines you were given. The spells may last longer than usual.   At home:  Keep using the continuous positive airway pressure (CPAP) device when you sleep. Unless your health care provider tells you not to, use it when you sleep, day or night. CPAP is a common device used to treat obstructive sleep apnea.  Talk with your provider before taking any pain medicine, muscle relaxants, or sedatives. Your provider will tell you about the possible dangers of taking these medicines.  © 3257-3892 [employer]. [address] [place] [address], [place] [address]. All rights reserved. This information is not intended as a substitute for professional medical care. Always follow your healthcare professional's instructions.  General Information:    1.  Do not drink alcoholic beverages including beer for 24 hours or as long as you are on pain medication..  2.  Do not drive a motor vehicle, operate machinery or power tools, or signs legal papers for 24 hours or as long as you are on pain medication.   3.  You may experience light-headedness, dizziness, and sleepiness following surgery. Please do not stay alone. A responsible adult should be with you for this 24 hour period.  4.  Go home and rest.    5. Progress slowly to a normal diet unless instructed.  Otherwise, begin with liquids such as soft drinks, then soup and crackers working up to solid foods. Drink plenty of nonalcoholic fluids.  6.  Certain anesthetics and pain medications produce nausea and vomiting in certain       individuals. If nausea becomes a problem at home, call you doctor.    7. A nurse will be calling you sometime after surgery. Do not be  alarmed. This is our way of finding out how you are doing.    8. Several times every hour while you are awake, take 2-3 deep breaths and cough. If you had stomach surgery hold a pillow or rolled towel firmly against your stomach before you cough. This will help with any pain the cough might cause.  9. Several times every hour while you are awake, pump and flex your feet 5-6 times and do foot circles. This will help prevent blood clots.    10.Call your doctor for severe pain, bleeding, fever, or signs or symptoms of infection (pain, swelling, redness, foul odor, drainage).  Using Opioids for Pain Management     Your doctor has given instructions for you to take an opioid.  This is a drug for bad pain.  It helps control pain without causing bleeding and kidney problems.  Common opioid names are morphine, hydromorphone, oxycodone, and methadone. These drugs are called narcotics.    There are several safety concerns you need to know.     It is against the law to give or sell this drug to another person.  You must keep this medicine safely locked.    You may have side effects from taking this medication.  These include nausea, itching, sweating, sleepiness, a change in your ability to breathe, and depression.  Do not take alcohol or sleeping pills opioids.    Long-term opoid use may no longer giver you relief from pain.  It can cause you stomach pain, mental anxiety, and headaches.  Long-term opoid use can potentially lead to unlawful street drug abuse and reduce your ability to stay employed.    Your body may become opioid tolerant if you need to take more to get relief.    You must stop taking opioids if you begin having more pain as a result of the medicine.    Opioid withdrawal occurs when you have to stop taking the drug.  It can cause you to have nausea, vomiting, diarrhea, stomach pain, anxiety, and dilated pupils in your eyes. This condition means you are opioid dependent.    Addiction is a drug induced brain  disease. It means there are changes in how your brain is working.  Children, teens, and young adults under 25 years old are more likely to get addicted to opioids.      Addiction can happen with repeated opioid use.  It does not happen with short-term use of two weeks or less.       For more information, please speak with your doctor or pharmacist.      Using an Incentive Spirometer    An incentive spirometer is a device that helps you do deep breathing exercises. These exercises expand your lungs, aid in circulation, and help prevent pneumonia. Deep breathing exercises also help you breathe better and improve the function of your lungs by:  Keeping your lungs clear  Strengthening your breathing muscles  Helping prevent respiratory complications or problems  The incentive spirometer gives you a way to take an active part in recover. A nurse or therapist will teach you breathing exercises. To do these exercises, you will breathe in through your mouth and not your nose. The incentive spirometer only works correctly if you breathe in through your mouth.  Steps to clear lungs  Step 1. Exhale normally. Then, inhale normally.  Relax and breathe out.  Step 2. Place your lips tightly around the mouthpiece.  Make sure the device is upright and not tilted.  Step 3. Inhale as much air as you can through the mouthpiece (don't breath through your nose).  Inhale slowly and deeply.  Hold your breath long enough to keep the balls or disk raised for at least 3 to 5 seconds, or as instructed by your healthcare provider.  Some spirometers have an indicator to let you know that you are breathing in too fast. If the indicator goes off, breathe in more slowly.  Step 4. Repeat the exercise regularly.  Do this exercise every hour while you're awake, or as instructed by your healthcare provider.  If you were taught deep breathing and coughing exercises, do them regularly as instructed by your healthcare provider.       We hope your stay was  comfortable as you heal now, mend and rest.    For we have enjoyed taking care of you by giving your our best.    And as you get better, by regaining your health and strength;   We count it as a privilege to have served you and hope your time at [facility] was well spent.      Thank  You!!!    You can wear a jock strap or tight white underwear to help with swelling    Call the doctor if:  Temperature is greater than 101F  Persistent vomiting and inability to keep food down  Inability to pee

## 2024-05-17 NOTE — PLAN OF CARE
VSS. NAD. Resp E/U. Calm and cooperative. Speech appropriate. Tolerating clear liquids. Denies nausea. Pain controlled. IV patent and infusing. Dressing and mesh underwear CDI. Due to void. Family notified of patient status. All safety measures taken. Bed in low position. Bedrails up x2. Bed locked. Cleared by Anesthesia.

## 2024-05-17 NOTE — OP NOTE
[facility]  Operative Note    Date: 05/17/2024    Pre-Op Diagnosis: Left spermatocele    Post-Op Diagnosis: same    Procedure(s) Performed:   1.  Left spermatocelectomy    Specimen(s):  Left spermatocele    Staff Surgeon: Evelina Cano MD    Anesthesia: General LMA anesthesia    Indications: Danny Chatman is a 63 y.o. male with a left spermatocele.  He desires surgical correction.      Findings:   - uncomplicated left spermatocelectomy with complete removal  - testicle appeared healthy     Estimated Blood Loss: min    Drains: none    Procedure in detail:  After risks benefits and possible complications of hydrocelectomy were explained, the patient elected to undergo the procedure and consent was obtained. All questions were answered in the pre-operative area. The patient was transferred to the operative suite and placed in supine position on the operative table.  SCDs were applied and working.  Anesthesia was administered and the patient was prepped and draped in the usual sterile fashion. Time out was performed, dao-procedural antibiotics were confirmed.     The patient's left testicle was palpated and brought to the anterior scrotal skin. A marking pen was used to corrina a 5 cm incision along the midline raphe.  A 15 blade was used to sharply incise the incision.  The underlying dartos and spermatic fascia was dissected with electrocautery until the testicle and spermatocele could be delivered through the scrotal incision. The tunica vaginalis was bluntly dissected free with a moist ray lucia. The spermatocele was circumferentially dissected off the testicle and surrounding tissue using a combination of both sharp dissection and electrocautery. The neck at the epididymis was identified and tied off using a 2-0 silk tie then transected.  The cord structures were inspected and found to be in tact. The specimen was passed off the field.     The scrotum was irrigated with NS. Hemostasis was  obtained with electrocautery. The testicle was returned to its orthotopic position. The dartos fascia was closed with a 3.0 vicryl in a running fashion. The skin was re approximated with a 340 monocryl suture in a horizontal matress fashion. Dermabond, fluffs and scrotal support were applied    The patient tolerated the procedure well and was transferred to the PACU in stable condition    Disposition:  The patient will follow up in 6 weeks.    Evelina Cano MD

## 2024-05-20 VITALS
TEMPERATURE: 98 F | HEIGHT: 69 IN | WEIGHT: 184.06 LBS | SYSTOLIC BLOOD PRESSURE: 114 MMHG | DIASTOLIC BLOOD PRESSURE: 67 MMHG | BODY MASS INDEX: 27.26 KG/M2 | RESPIRATION RATE: 20 BRPM | OXYGEN SATURATION: 97 % | HEART RATE: 64 BPM

## 2024-05-20 NOTE — ANESTHESIA POSTPROCEDURE EVALUATION
Anesthesia Post Evaluation    Patient: Danny Chatman    Procedure(s) Performed: Procedure(s) (LRB):  EXCISION, SPERMATOCELE (Left)    Final Anesthesia Type: general      Patient location during evaluation: PACU  Patient participation: Yes- Able to Participate  Level of consciousness: awake  Post-procedure vital signs: reviewed and stable  Pain management: adequate  Airway patency: patent    PONV status at discharge: No PONV  Anesthetic complications: no      Cardiovascular status: blood pressure returned to baseline  Respiratory status: unassisted  Hydration status: euvolemic  Follow-up not needed.              Vitals Value Taken Time   /67 05/17/24 1357   Temp 36.6 °C (97.9 °F) 05/17/24 1340   Pulse 64 05/17/24 1357   Resp 20 05/17/24 1357   SpO2 97 % 05/17/24 1357         Event Time   Out of Recovery 13:45:00         Pain/Joss Score: No data recorded

## 2024-05-28 ENCOUNTER — PATIENT MESSAGE (OUTPATIENT)
Dept: FAMILY MEDICINE | Facility: CLINIC | Age: 64
End: 2024-05-28
Payer: MEDICARE

## 2024-06-23 ENCOUNTER — PATIENT MESSAGE (OUTPATIENT)
Dept: FAMILY MEDICINE | Facility: CLINIC | Age: 64
End: 2024-06-23
Payer: MEDICARE

## 2024-06-27 NOTE — PROGRESS NOTES
[facility] Note    PCP: Marvin Peñaloza MD    Chief Complaint: spermatocele    SUBJECTIVE:       History of Present Illness:  Danny Chatman is a 64 y.o. male who presents to clinic for spermatocele. He is Established  to our clinic.     S/P spermatocele excision on 5/17/24.     He is doing very well with no complaints.   No residual swelling.       4/25/24  Noted swelling in the scrotum years ago and has been progressively worsening. It is becoming more and more bothersome to him. He is interested in having it removed.     Had a vasectomy 30 years ago and following this noted the cyst. Also had pain with erections.     No bothersome LUTS.     Scrotal US 4/19/24: A large extratesticular cystic mass located superiorly within the left hemiscrotum measures 5.4 by 3.5 x 4.2 cm and previously measured 4.2 x 2.3 x 3.2 cm. There are some mild low level internal echoes observed within the cyst without internal nodularity or thick walled septation. There is a 3-4 mm thinly septated right epididymal cyst at the level of the epididymal head. The right epididymis appears otherwise unremarkable. The left epididymis is poorly visualized.     Last urine culture: no documented UTIs    Lab Results   Component Value Date    CREATININE 1.0 08/11/2023     PSA, Screen (ng/mL)   Date Value   08/11/2023 1.9     Past medical, family, and social history reviewed as documented in chart with pertinent positive medical, family, and social history detailed in HPI.    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 OR;   "Service: Orthopedics;  Laterality: Right;    COLONOSCOPY N/A 12/6/2016    Procedure: COLONOSCOPY;  Surgeon: Lucho Vera MD;  Location: [facility] ENDO;  Service: Endoscopy;  Laterality: N/A;    COLONOSCOPY N/A 12/14/2023    Procedure: COLONOSCOPY;  Surgeon: Perez Cotto MD;  Location: [facility] ENDO;  Service: Endoscopy;  Laterality: N/A;    FINGER FRACTURE SURGERY      R index    SPERMATOCELECTOMY Left 5/17/2024    Procedure: EXCISION, SPERMATOCELE;  Surgeon: Evelina Cano MD;  Location: [facility] OR;  Service: Urology;  Laterality: Left;    SYNOVECTOMY OF ELBOW Right 11/17/2022    Procedure: SYNOVECTOMY, ELBOW;  Surgeon: Alie Madison MD;  Location: [facility] OR;  Service: Orthopedics;  Laterality: Right;    VASECTOMY  1989     Family History   Problem Relation Name Age of Onset    Skin cancer Mother      Cerebral aneurysm Mother      No Known Problems Maternal Grandmother      No Known Problems Maternal Grandfather      No Known Problems Paternal Grandmother      No Known Problems Paternal Grandfather      Amblyopia Neg Hx      Blindness Neg Hx      Cancer Neg Hx      Cataracts Neg Hx      Glaucoma Neg Hx      Diabetes Neg Hx      Hypertension Neg Hx      Macular degeneration Neg Hx      Retinal detachment Neg Hx      Strabismus Neg Hx      Stroke Neg Hx      Thyroid disease Neg Hx      Melanoma Neg Hx      Psoriasis Neg Hx      Lupus Neg Hx      Eczema Neg Hx       Social History     Tobacco Use    Smoking status: Never    Smokeless tobacco: Never   Substance Use Topics    Alcohol use: Yes     Alcohol/week: 0.0 standard drinks of alcohol     Comment: rare    Drug use: No        Review of Systems    OBJECTIVE:     Anticoagulation:  no    Estimated body mass index is 27.18 kg/m² as calculated from the following:    Height as of this encounter: 5' 9" (1.753 m).    Weight as of this encounter: 83.5 kg (184 lb 1.4 oz).    Vital Signs (Most Recent)       Physical Exam  Genitourinary:     Comments: Incision well " healed. No scrotal swelling. There is a small knot of scar tissue in area where spermatocele was excised. No evidence of recurrence.         BMP  Lab Results   Component Value Date     08/11/2023    K 4.1 08/11/2023     08/11/2023    CO2 25 08/11/2023    BUN 23 08/11/2023    CREATININE 1.0 08/11/2023    CALCIUM 9.2 08/11/2023    ANIONGAP 10 08/11/2023    ESTGFRAFRICA >60.0 07/30/2021    EGFRNONAA >60.0 07/30/2021       Lab Results   Component Value Date    WBC 6.28 08/11/2023    HGB 14.5 08/11/2023    HCT 44.7 08/11/2023    MCV 92 08/11/2023     08/11/2023     Imaging:  Per HPI    ASSESSMENT     1. Epididymal cyst      PLAN:     - Doing very well  - Exam is normal  - Can follow up as needed       Evelina Cano MD

## 2024-07-01 ENCOUNTER — OFFICE VISIT (OUTPATIENT)
Dept: UROLOGY | Facility: CLINIC | Age: 64
End: 2024-07-01
Payer: MEDICARE

## 2024-07-01 VITALS — WEIGHT: 184.06 LBS | BODY MASS INDEX: 27.26 KG/M2 | HEIGHT: 69 IN

## 2024-07-01 DIAGNOSIS — N50.3 EPIDIDYMAL CYST: Primary | ICD-10-CM

## 2024-07-01 PROCEDURE — 99999 PR PBB SHADOW E&M-EST. PATIENT-LVL II: CPT | Mod: PBBFAC,,, | Performed by: STUDENT IN AN ORGANIZED HEALTH CARE EDUCATION/TRAINING PROGRAM

## 2024-07-01 PROCEDURE — 99024 POSTOP FOLLOW-UP VISIT: CPT | Mod: POP,,, | Performed by: STUDENT IN AN ORGANIZED HEALTH CARE EDUCATION/TRAINING PROGRAM

## 2024-07-01 PROCEDURE — 99212 OFFICE O/P EST SF 10 MIN: CPT | Mod: PBBFAC,PO | Performed by: STUDENT IN AN ORGANIZED HEALTH CARE EDUCATION/TRAINING PROGRAM

## 2024-08-13 ENCOUNTER — OFFICE VISIT (OUTPATIENT)
Dept: FAMILY MEDICINE | Facility: CLINIC | Age: 64
End: 2024-08-13
Payer: MEDICARE

## 2024-08-13 ENCOUNTER — HOSPITAL ENCOUNTER (OUTPATIENT)
Dept: RADIOLOGY | Facility: CLINIC | Age: 64
Discharge: HOME | End: 2024-08-13
Attending: FAMILY MEDICINE
Payer: MEDICARE

## 2024-08-13 VITALS
BODY MASS INDEX: 27.3 KG/M2 | TEMPERATURE: 98 F | RESPIRATION RATE: 16 BRPM | WEIGHT: 184.31 LBS | OXYGEN SATURATION: 95 % | SYSTOLIC BLOOD PRESSURE: 104 MMHG | DIASTOLIC BLOOD PRESSURE: 70 MMHG | HEIGHT: 69 IN | HEART RATE: 71 BPM

## 2024-08-13 DIAGNOSIS — R79.9 ABNORMAL BLOOD FINDING: ICD-10-CM

## 2024-08-13 DIAGNOSIS — M25.562 LEFT KNEE PAIN, UNSPECIFIED CHRONICITY: ICD-10-CM

## 2024-08-13 DIAGNOSIS — Z12.5 SCREENING FOR PROSTATE CANCER: ICD-10-CM

## 2024-08-13 DIAGNOSIS — M25.562 LEFT KNEE PAIN, UNSPECIFIED CHRONICITY: Primary | ICD-10-CM

## 2024-08-13 PROCEDURE — 73562 X-RAY EXAM OF KNEE 3: CPT | Mod: 26,LT,, | Performed by: RADIOLOGY

## 2024-08-13 PROCEDURE — 99214 OFFICE O/P EST MOD 30 MIN: CPT | Mod: PBBFAC,PO | Performed by: FAMILY MEDICINE

## 2024-08-13 PROCEDURE — 73562 X-RAY EXAM OF KNEE 3: CPT | Mod: TC,FY,PO,LT

## 2024-08-13 PROCEDURE — 73560 X-RAY EXAM OF KNEE 1 OR 2: CPT | Mod: TC,FY,PO,RT

## 2024-08-13 PROCEDURE — 73560 X-RAY EXAM OF KNEE 1 OR 2: CPT | Mod: 26,59,RT, | Performed by: RADIOLOGY

## 2024-08-13 PROCEDURE — G2211 COMPLEX E/M VISIT ADD ON: HCPCS | Mod: S$PBB,,, | Performed by: FAMILY MEDICINE

## 2024-08-13 PROCEDURE — 99999 PR PBB SHADOW E&M-EST. PATIENT-LVL IV: CPT | Mod: PBBFAC,,, | Performed by: FAMILY MEDICINE

## 2024-08-13 PROCEDURE — 99214 OFFICE O/P EST MOD 30 MIN: CPT | Mod: S$PBB,,, | Performed by: FAMILY MEDICINE

## 2024-08-13 NOTE — PROGRESS NOTES
Subjective:   Patient ID: Danny Chatman is a 64 y.o. male     Chief Complaint:Annual Exam      Here for checkup. Notes trip and fall and since has been having knee discomfort when kneeling. Notes feels area that has him concerned for fracture.       Review of Systems   Respiratory:  Negative for shortness of breath.    Cardiovascular:  Negative for chest pain.   Gastrointestinal:  Negative for abdominal pain.   Genitourinary:  Negative for dysuria.   Musculoskeletal:  Positive for arthralgias.     Past Medical History:   Diagnosis Date    Cataract     Choroidal nevus of left eye 01/25/2018    Colon polyp     Hyperlipidemia      Past Surgical History:   Procedure Laterality Date    ANTERIOR CRUCIATE LIGAMENT REPAIR Right 1999    ARTHROSCOPY OF ELBOW Right 11/17/2022    Procedure: ARTHROSCOPY, ELBOW-Loose body removal/debridement;  Surgeon: Alie Madison MD;  Location: [facility];  Service: Orthopedics;  Laterality: Right;    COLONOSCOPY N/A 12/6/2016    Procedure: COLONOSCOPY;  Surgeon: Lucho Vera MD;  Location: [facility];  Service: Endoscopy;  Laterality: N/A;    COLONOSCOPY N/A 12/14/2023    Procedure: COLONOSCOPY;  Surgeon: Perez Cotto MD;  Location: [facility];  Service: Endoscopy;  Laterality: N/A;    FINGER FRACTURE SURGERY      R index    SPERMATOCELECTOMY Left 5/17/2024    Procedure: EXCISION, SPERMATOCELE;  Surgeon: Evelina Cano MD;  Location: [facility];  Service: Urology;  Laterality: Left;    SYNOVECTOMY OF ELBOW Right 11/17/2022    Procedure: SYNOVECTOMY, ELBOW;  Surgeon: Alie Madison MD;  Location: [facility];  Service: Orthopedics;  Laterality: Right;    VASECTOMY  1989     Objective:     Vitals:    08/13/24 0938   BP: 104/70   Pulse: 71   Resp: 16   Temp: 98.1 °F (36.7 °C)     Body mass index is 27.22 kg/m².  Physical Exam  Vitals and nursing note reviewed.   Constitutional:       Appearance: He is well-developed.   HENT:      Head: Normocephalic and atraumatic.   Eyes:       General: No scleral icterus.     Conjunctiva/sclera: Conjunctivae normal.   Cardiovascular:      Heart sounds: No murmur heard.  Pulmonary:      Effort: Pulmonary effort is normal. No respiratory distress.   Musculoskeletal:         General: No deformity. Normal range of motion.      Cervical back: Normal range of motion and neck supple.   Skin:     Coloration: Skin is not pale.      Findings: No rash.   Neurological:      Mental Status: He is alert and oriented to person, place, and time.   Psychiatric:         Behavior: Behavior normal.         Thought Content: Thought content normal.         Judgment: Judgment normal.       Assessment:     1. Left knee pain, unspecified chronicity    2. Screening for prostate cancer    3. Abnormal blood finding      Plan:   Left knee pain, unspecified chronicity  -     X-ray Knee Ortho Left; Future; Expected date: 08/13/2024  -     Comprehensive Metabolic Panel; Future; Expected date: 08/13/2024  -     Hemoglobin A1C; Future; Expected date: 08/13/2024  -     Lipid Panel; Future; Expected date: 08/13/2024  -     CBC Auto Differential; Future; Expected date: 08/13/2024    Screening for prostate cancer  -     PSA, SCREENING; Future; Expected date: 08/13/2024    Abnormal blood finding  -     Comprehensive Metabolic Panel; Future; Expected date: 08/13/2024  -     Hemoglobin A1C; Future; Expected date: 08/13/2024  -     Lipid Panel; Future; Expected date: 08/13/2024  -     CBC Auto Differential; Future; Expected date: 08/13/2024            Total time spent of Greater than 30 minutes minutes on the day of the visit.This includes face to face time and preparing to see the patient, obtaining and reviewing separately obtained history, documenting clinical information in the electronic or other health record, independently interpreting results and communicating results to the patient/family/caregiver, or care coordinator.    Established patient with me has been instructed that must see me  at least 1 time yearly (every 365 days) for refills of medications. Seeing other providers in this clinic is fine but expectation is to see me yearly.    Marvin Peñaloza MD  08/13/2024    Portions of this note have been dictated with MARIBEL Ribeiro

## 2024-11-27 ENCOUNTER — HOSPITAL ENCOUNTER (OUTPATIENT)
Dept: RADIOLOGY | Facility: CLINIC | Age: 64
Discharge: HOME | End: 2024-11-27
Payer: MEDICARE

## 2024-11-27 ENCOUNTER — OFFICE VISIT (OUTPATIENT)
Dept: FAMILY MEDICINE | Facility: CLINIC | Age: 64
End: 2024-11-27
Payer: MEDICARE

## 2024-11-27 ENCOUNTER — PATIENT MESSAGE (OUTPATIENT)
Dept: FAMILY MEDICINE | Facility: CLINIC | Age: 64
End: 2024-11-27

## 2024-11-27 VITALS
DIASTOLIC BLOOD PRESSURE: 64 MMHG | OXYGEN SATURATION: 95 % | WEIGHT: 192 LBS | RESPIRATION RATE: 16 BRPM | BODY MASS INDEX: 28.44 KG/M2 | HEART RATE: 79 BPM | HEIGHT: 69 IN | SYSTOLIC BLOOD PRESSURE: 110 MMHG | TEMPERATURE: 98 F

## 2024-11-27 DIAGNOSIS — J06.9 UPPER RESPIRATORY TRACT INFECTION, UNSPECIFIED TYPE: Primary | ICD-10-CM

## 2024-11-27 DIAGNOSIS — J06.9 UPPER RESPIRATORY TRACT INFECTION, UNSPECIFIED TYPE: ICD-10-CM

## 2024-11-27 PROCEDURE — 71046 X-RAY EXAM CHEST 2 VIEWS: CPT | Mod: 26,,, | Performed by: RADIOLOGY

## 2024-11-27 PROCEDURE — 96372 THER/PROPH/DIAG INJ SC/IM: CPT | Mod: PBBFAC,PO

## 2024-11-27 PROCEDURE — 99999PBSHW PR PBB SHADOW TECHNICAL ONLY FILED TO HB: Mod: PBBFAC,,,

## 2024-11-27 PROCEDURE — 71046 X-RAY EXAM CHEST 2 VIEWS: CPT | Mod: TC,FY,PO

## 2024-11-27 PROCEDURE — 99215 OFFICE O/P EST HI 40 MIN: CPT | Mod: PBBFAC,PO

## 2024-11-27 PROCEDURE — 99999 PR PBB SHADOW E&M-EST. PATIENT-LVL V: CPT | Mod: PBBFAC,,,

## 2024-11-27 PROCEDURE — 99213 OFFICE O/P EST LOW 20 MIN: CPT | Mod: S$PBB,,,

## 2024-11-27 RX ORDER — FLUTICASONE PROPIONATE 50 MCG
1 SPRAY, SUSPENSION (ML) NASAL DAILY
Qty: 9.9 ML | Refills: 0 | Status: SHIPPED | OUTPATIENT
Start: 2024-11-27 | End: 2024-12-27

## 2024-11-27 RX ORDER — DEXAMETHASONE SODIUM PHOSPHATE 4 MG/ML
8 INJECTION, SOLUTION INTRA-ARTICULAR; INTRALESIONAL; INTRAMUSCULAR; INTRAVENOUS; SOFT TISSUE ONCE
Status: COMPLETED | OUTPATIENT
Start: 2024-11-27 | End: 2024-11-27

## 2024-11-27 RX ORDER — AZITHROMYCIN 250 MG/1
TABLET, FILM COATED ORAL
Qty: 6 TABLET | Refills: 0 | Status: SHIPPED | OUTPATIENT
Start: 2024-11-27 | End: 2024-12-02

## 2024-11-27 RX ORDER — CETIRIZINE HYDROCHLORIDE 10 MG/1
10 TABLET ORAL DAILY
Qty: 30 TABLET | Refills: 0 | Status: SHIPPED | OUTPATIENT
Start: 2024-11-27 | End: 2024-12-27

## 2024-11-27 RX ADMIN — DEXAMETHASONE SODIUM PHOSPHATE 8 MG: 4 INJECTION INTRA-ARTICULAR; INTRALESIONAL; INTRAMUSCULAR; INTRAVENOUS; SOFT TISSUE at 10:11

## 2024-11-27 NOTE — PROGRESS NOTES
Identified pts name and , Decadron 8 mg administered IM, pt tolerated well. Aseptic technique maintained. Pain scale 0 out of 10 on pain scale. Pt instructed to wait in clinic for 15 minutes after injection was given.

## 2024-11-27 NOTE — PROGRESS NOTES
Subjective:       Patient ID:  [id number]     Chief Complaint: Cough      History of Present Illness        Mr. Len lux is a 64-year-old male who presents to the clinic for cough and sinusitis symptoms.      Patient reports that he has had a dry cough since July that had no improvement.  However, yesterday his cough worsened and was associated with nasal congestion and sore throat.  Patient has not had workup for chronic cough.  He denies fever, chills, and body aches.    No other concerns.     Past Medical History:   Diagnosis Date    Cataract     Choroidal nevus of left eye 01/25/2018    Colon polyp     Hyperlipidemia       Active Problem List with Overview Notes    Diagnosis Date Noted    Epididymal cyst 05/17/2024    Arthritis of right elbow 11/17/2022    Loose body(ies), joint, elbow, right 11/17/2022    Choroidal nevus of left eye 01/25/2018    History of colon polyps 12/06/2016    Colon polyp 12/06/2016    Diverticulosis of large intestine without hemorrhage 12/06/2016      Review of patient's allergies indicates:  No Known Allergies      Current Outpatient Medications:     MULTIVIT-MIN/FA/LYCOPEN/LUTEIN (CENTRUM SILVER ULTRA MEN'S ORAL), Take 1 tablet by mouth once daily., Disp: , Rfl:     azithromycin (Z-SARAH) 250 MG tablet, Take 2 tablets by mouth on day 1; Take 1 tablet by mouth on days 2-5, Disp: 6 tablet, Rfl: 0    cetirizine (ZYRTEC) 10 MG tablet, Take 1 tablet (10 mg total) by mouth once daily., Disp: 30 tablet, Rfl: 0    fluticasone propionate (FLONASE) 50 mcg/actuation nasal spray, 1 spray (50 mcg total) by Each Nostril route once daily., Disp: 9.9 mL, Rfl: 0    Current Facility-Administered Medications:     dexAMETHasone injection 8 mg, 8 mg, Intramuscular, Once, Keshia Quijano PA-C    Facility-Administered Medications Ordered in Other Visits:     electrolyte-S (ISOLYTE), , Intravenous, Continuous, lA Mckeon MD, Stopped at 05/17/24 5287    fentaNYL 50 mcg/mL injection 25 mcg, 25 mcg,  Intravenous, Q5 Min PRN, lA Mckeon MD    lactated ringers infusion, , Intravenous, Continuous, Al Mckeon MD    LIDOcaine (PF) 10 mg/ml (1%) injection 10 mg, 1 mL, Intradermal, Once, Al Mckeon MD    metoclopramide injection 10 mg, 10 mg, Intravenous, Q10 Min PRN, Al Mckeon MD    Lab Results   Component Value Date    WBC 5.87 08/13/2024    HGB 14.4 08/13/2024    HCT 43.6 08/13/2024     08/13/2024    CHOL 198 08/13/2024    TRIG 154 (H) 08/13/2024    HDL 45 08/13/2024    ALT 28 08/13/2024    AST 19 08/13/2024     08/13/2024    K 4.0 08/13/2024     08/13/2024    CREATININE 1.2 08/13/2024    BUN 17 08/13/2024    CO2 26 08/13/2024    TSH 1.721 08/10/2022    PSA 2.1 08/13/2024    HGBA1C 5.3 08/13/2024       Review of Systems   Constitutional:  Negative for fatigue and fever.   HENT:  Positive for congestion. Negative for sneezing and sore throat.    Eyes: Negative.    Respiratory:  Positive for cough. Negative for shortness of breath and wheezing.    Cardiovascular:  Negative for chest pain, palpitations and leg swelling.   Gastrointestinal:  Negative for abdominal pain, nausea and vomiting.   Genitourinary: Negative.    Musculoskeletal: Negative.  Negative for arthralgias.   Skin: Negative.  Negative for rash.   Neurological:  Negative for dizziness, weakness, light-headedness, numbness and headaches.   Hematological: Negative.    Psychiatric/Behavioral: Negative.         Objective:      Physical Exam  Constitutional:       Appearance: Normal appearance.   HENT:      Head: Normocephalic and atraumatic.      Nose: Nose normal.   Eyes:      Extraocular Movements: Extraocular movements intact.   Cardiovascular:      Rate and Rhythm: Normal rate and regular rhythm.   Pulmonary:      Effort: Pulmonary effort is normal.      Breath sounds: Normal breath sounds.   Musculoskeletal:         General: Normal range of motion.      Cervical back: Normal range of motion.   Skin:      General: Skin is warm and dry.   Neurological:      General: No focal deficit present.      Mental Status: He is alert and oriented to person, place, and time.   Psychiatric:         Mood and Affect: Mood normal.         Assessment:       1. Upper respiratory tract infection, unspecified type        Plan:       Danny was seen today for cough.    Diagnoses and all orders for this visit:    Upper respiratory tract infection, unspecified type  - discussed with patient that I believe his symptoms are likely viral.  However, since he was had a persistent cough with minimal improvement I will send in an antibiotic.  I recommend a chest x-ray for further assessment.  Follow up if symptoms persist  -     dexAMETHasone injection 8 mg  -     X-Ray Chest PA And Lateral; Future  -     cetirizine (ZYRTEC) 10 MG tablet; Take 1 tablet (10 mg total) by mouth once daily.  -     fluticasone propionate (FLONASE) 50 mcg/actuation nasal spray; 1 spray (50 mcg total) by Each Nostril route once daily.  -     azithromycin (Z-SARAH) 250 MG tablet; Take 2 tablets by mouth on day 1; Take 1 tablet by mouth on days 2-5                        Future Appointments       Date Provider Specialty Appt Notes    11/27/2024  Radiology Xray    8/15/2025 Marvin Peñaloza MD Family Medicine Annual               Portions of this note were dictated using voice recognition software and may contain dictation related errors in spelling / grammar / syntax not discovered on text review.     Keshia Quijano PA-C

## 2024-12-02 ENCOUNTER — OFFICE VISIT (OUTPATIENT)
Dept: FAMILY MEDICINE | Facility: CLINIC | Age: 64
End: 2024-12-02
Payer: MEDICARE

## 2024-12-02 DIAGNOSIS — R35.0 URINARY FREQUENCY: ICD-10-CM

## 2024-12-02 DIAGNOSIS — J40 BRONCHITIS: Primary | ICD-10-CM

## 2024-12-02 DIAGNOSIS — R05.1 ACUTE COUGH: ICD-10-CM

## 2024-12-02 DIAGNOSIS — R06.2 WHEEZING: ICD-10-CM

## 2024-12-02 PROCEDURE — 99213 OFFICE O/P EST LOW 20 MIN: CPT | Mod: PBBFAC,PO

## 2024-12-02 PROCEDURE — 99214 OFFICE O/P EST MOD 30 MIN: CPT | Mod: S$PBB,,,

## 2024-12-02 PROCEDURE — 99999 PR PBB SHADOW E&M-EST. PATIENT-LVL III: CPT | Mod: PBBFAC,,,

## 2024-12-02 RX ORDER — TAMSULOSIN HYDROCHLORIDE 0.4 MG/1
0.4 CAPSULE ORAL DAILY
Qty: 30 CAPSULE | Refills: 0 | Status: SHIPPED | OUTPATIENT
Start: 2024-12-02 | End: 2025-01-01

## 2024-12-02 RX ORDER — DOXYCYCLINE 100 MG/1
100 CAPSULE ORAL 2 TIMES DAILY
Qty: 20 CAPSULE | Refills: 0 | Status: SHIPPED | OUTPATIENT
Start: 2024-12-02 | End: 2024-12-12

## 2024-12-02 RX ORDER — BENZONATATE 100 MG/1
100 CAPSULE ORAL 3 TIMES DAILY PRN
Qty: 30 CAPSULE | Refills: 0 | Status: SHIPPED | OUTPATIENT
Start: 2024-12-02 | End: 2024-12-12

## 2024-12-02 RX ORDER — PROMETHAZINE HYDROCHLORIDE AND DEXTROMETHORPHAN HYDROBROMIDE 6.25; 15 MG/5ML; MG/5ML
5 SYRUP ORAL EVERY 4 HOURS PRN
Qty: 118 ML | Refills: 0 | Status: SHIPPED | OUTPATIENT
Start: 2024-12-02 | End: 2024-12-12

## 2024-12-02 RX ORDER — PREDNISONE 20 MG/1
40 TABLET ORAL DAILY
Qty: 10 TABLET | Refills: 0 | Status: SHIPPED | OUTPATIENT
Start: 2024-12-02 | End: 2024-12-07

## 2024-12-02 RX ORDER — ALBUTEROL SULFATE 90 UG/1
2 INHALANT RESPIRATORY (INHALATION) EVERY 6 HOURS PRN
Qty: 8 G | Refills: 0 | Status: SHIPPED | OUTPATIENT
Start: 2024-12-02

## 2024-12-02 NOTE — PATIENT INSTRUCTIONS
Thank you for allowing me to be part of your healthcare team at [facility]. It is a pleasure to care for you today.   Please take all of your medications as instructed and follow all new instructions from your visit today.  If you received labs or medical tests today you should hear information about results or scheduling either by phone or mychart within approximately a week.   If you have any questions or concerns please do not hesitate to call. Have a blessed day and I hope to see you again soon.  JU Odell      WE STRIVE FOR 5'S!!!        We strive for exceptional care. Please fill out a survey if you received 5 star service.

## 2024-12-02 NOTE — PROGRESS NOTES
Subjective:       Patient ID: Danny Chatman is a 64 y.o. male.    Chief Complaint: No chief complaint on file.    Patient presents to the clinic with complaint of continued cough.     He was seen last week for cough and started on Zyrtec, Flonase, and Azithromycin. He states cough has worsened. Cough is productive with purulent sputum. He does report wheezing.     He also reports urinary frequency at night. States he gets up 3-4 times nightly to urinate. States he feels as if he does not urinate much when he does go.     Patient educated on plan of care, verbalized understanding.      Cough  This is a new problem. The current episode started 1 to 4 weeks ago. The problem has been gradually worsening. The problem occurs every few minutes. The cough is Productive of purulent sputum. Associated symptoms include wheezing. Pertinent negatives include no chest pain, chills, ear pain, eye redness, fever, postnasal drip, rash, sore throat or shortness of breath. The symptoms are aggravated by lying down. Treatments tried: Zyrtec, Flonase, Azithromycin. The treatment provided no relief.     Review of Systems   Constitutional:  Negative for activity change, appetite change, chills, diaphoresis and fever.   HENT:  Negative for congestion, ear pain, postnasal drip, sinus pressure, sneezing and sore throat.    Eyes:  Negative for pain, discharge, redness and itching.   Respiratory:  Positive for cough and wheezing. Negative for apnea, chest tightness and shortness of breath.    Cardiovascular:  Negative for chest pain and leg swelling.   Gastrointestinal:  Negative for abdominal distention, abdominal pain, constipation, diarrhea, nausea and vomiting.   Genitourinary:  Positive for frequency. Negative for difficulty urinating, dysuria and flank pain.   Skin:  Negative for color change, rash and wound.   Neurological:  Negative for dizziness.   All other systems reviewed and are negative.      Patient Active Problem List    Diagnosis    History of colon polyps    Colon polyp    Diverticulosis of large intestine without hemorrhage    Choroidal nevus of left eye    Arthritis of right elbow    Loose body(ies), joint, elbow, right    Epididymal cyst       Objective:      Physical Exam  Vitals and nursing note reviewed.   Constitutional:       Appearance: Normal appearance. He is not ill-appearing.   HENT:      Head: Normocephalic and atraumatic.      Nose: Nose normal.   Eyes:      General: Lids are normal.   Cardiovascular:      Rate and Rhythm: Normal rate and regular rhythm.      Pulses: Normal pulses.      Heart sounds: Normal heart sounds.   Pulmonary:      Effort: Pulmonary effort is normal. No tachypnea or respiratory distress.      Breath sounds: Wheezing present.   Abdominal:      General: Bowel sounds are normal. There is no distension.      Palpations: Abdomen is soft.      Tenderness: There is no abdominal tenderness.   Musculoskeletal:         General: Normal range of motion.      Cervical back: Full passive range of motion without pain and normal range of motion.      Left lower leg: No edema.   Skin:     General: Skin is warm and dry.   Neurological:      Mental Status: He is alert and oriented to person, place, and time.   Psychiatric:         Mood and Affect: Mood normal.         Behavior: Behavior normal.         Lab Results   Component Value Date    WBC 5.87 08/13/2024    HGB 14.4 08/13/2024    HCT 43.6 08/13/2024     08/13/2024    CHOL 198 08/13/2024    TRIG 154 (H) 08/13/2024    HDL 45 08/13/2024    ALT 28 08/13/2024    AST 19 08/13/2024     08/13/2024    K 4.0 08/13/2024     08/13/2024    CREATININE 1.2 08/13/2024    BUN 17 08/13/2024    CO2 26 08/13/2024    TSH 1.721 08/10/2022    PSA 2.1 08/13/2024    HGBA1C 5.3 08/13/2024     The 10-year ASCVD risk score (Stephanie MANUEL, et al., 2019) is: 11.2%    Values used to calculate the score:      Age: 64 years      Sex: Male      Is Non-   American: No      Diabetic: No      Tobacco smoker: No      Systolic Blood Pressure: 110 mmHg      Is BP treated: No      HDL Cholesterol: 45 mg/dL      Total Cholesterol: 198 mg/dL  There were no vitals taken for this visit.   Assessment:       1. Bronchitis    2. Urinary frequency    3. Acute cough    4. Wheezing        Plan:       1. Bronchitis/ Acute cough/Wheezing  -     doxycycline (VIBRAMYCIN) 100 MG Cap; Take 1 capsule (100 mg total) by mouth 2 (two) times daily. for 10 days  Dispense: 20 capsule; Refill: 0  -     predniSONE (DELTASONE) 20 MG tablet; Take 2 tablets (40 mg total) by mouth once daily. for 5 days  Dispense: 10 tablet; Refill: 0  -     albuterol (VENTOLIN HFA) 90 mcg/actuation inhaler; Inhale 2 puffs into the lungs every 6 (six) hours as needed for Wheezing. Rescue  Dispense: 8 g; Refill: 0  -     promethazine-dextromethorphan (PROMETHAZINE-DM) 6.25-15 mg/5 mL Syrp; Take 5 mLs by mouth every 4 (four) hours as needed (cough).  Dispense: 118 mL; Refill: 0  -     benzonatate (TESSALON) 100 MG capsule; Take 1 capsule (100 mg total) by mouth 3 (three) times daily as needed for Cough.  Dispense: 30 capsule; Refill: 0   - The diagnosis, treatment plan, instructions for follow-up and reevaluation as well as ED precautions were discussed and understanding was verbalized. All questions or concerns have been addressed.     2. Urinary frequency  -     tamsulosin (FLOMAX) 0.4 mg Cap; Take 1 capsule (0.4 mg total) by mouth once daily.  Dispense: 30 capsule; Refill: 0           Follow up if symptoms worsen or fail to improve.      Future Appointments       Date Provider Specialty Appt Notes    8/15/2025 Marvin Peñaloza MD Family Medicine Annual

## 2025-06-24 ENCOUNTER — OFFICE VISIT (OUTPATIENT)
Dept: FAMILY MEDICINE | Facility: CLINIC | Age: 65
End: 2025-06-24
Payer: MEDICARE

## 2025-06-24 VITALS
HEIGHT: 69 IN | OXYGEN SATURATION: 95 % | HEART RATE: 64 BPM | SYSTOLIC BLOOD PRESSURE: 128 MMHG | RESPIRATION RATE: 16 BRPM | TEMPERATURE: 98 F | DIASTOLIC BLOOD PRESSURE: 68 MMHG | BODY MASS INDEX: 28.18 KG/M2 | WEIGHT: 190.25 LBS

## 2025-06-24 DIAGNOSIS — R79.9 ABNORMAL BLOOD FINDING: ICD-10-CM

## 2025-06-24 DIAGNOSIS — Z00.00 HEALTHCARE MAINTENANCE: Primary | ICD-10-CM

## 2025-06-24 DIAGNOSIS — H35.30 MACULAR DEGENERATION, UNSPECIFIED LATERALITY, UNSPECIFIED TYPE: ICD-10-CM

## 2025-06-24 DIAGNOSIS — Z12.5 SCREENING FOR PROSTATE CANCER: ICD-10-CM

## 2025-06-24 DIAGNOSIS — R79.9 ABNORMAL FINDING OF BLOOD CHEMISTRY, UNSPECIFIED: ICD-10-CM

## 2025-06-24 PROCEDURE — 99999 PR PBB SHADOW E&M-EST. PATIENT-LVL IV: CPT | Mod: PBBFAC,,, | Performed by: FAMILY MEDICINE

## 2025-06-25 ENCOUNTER — LAB VISIT (OUTPATIENT)
Dept: LAB | Facility: HOSPITAL | Age: 65
End: 2025-06-25
Attending: FAMILY MEDICINE
Payer: MEDICARE

## 2025-06-25 DIAGNOSIS — H35.30 MACULAR DEGENERATION, UNSPECIFIED LATERALITY, UNSPECIFIED TYPE: ICD-10-CM

## 2025-06-25 DIAGNOSIS — R79.9 ABNORMAL BLOOD FINDING: ICD-10-CM

## 2025-06-25 DIAGNOSIS — Z00.00 HEALTHCARE MAINTENANCE: ICD-10-CM

## 2025-06-25 DIAGNOSIS — Z12.5 SCREENING FOR PROSTATE CANCER: ICD-10-CM

## 2025-06-25 DIAGNOSIS — R79.9 ABNORMAL FINDING OF BLOOD CHEMISTRY, UNSPECIFIED: ICD-10-CM

## 2025-06-25 LAB
ABSOLUTE EOSINOPHIL (OHS): 0.16 K/UL
ABSOLUTE MONOCYTE (OHS): 0.47 K/UL (ref 0.3–1)
ABSOLUTE NEUTROPHIL COUNT (OHS): 2.98 K/UL (ref 1.8–7.7)
ALBUMIN SERPL BCP-MCNC: 4 G/DL (ref 3.5–5.2)
ALP SERPL-CCNC: 55 UNIT/L (ref 40–150)
ALT SERPL W/O P-5'-P-CCNC: 19 UNIT/L (ref 10–44)
ANION GAP (OHS): 9 MMOL/L (ref 8–16)
AST SERPL-CCNC: 22 UNIT/L (ref 11–45)
BASOPHILS # BLD AUTO: 0.02 K/UL
BASOPHILS NFR BLD AUTO: 0.4 %
BILIRUB SERPL-MCNC: 0.6 MG/DL (ref 0.1–1)
BUN SERPL-MCNC: 19 MG/DL (ref 8–23)
CALCIUM SERPL-MCNC: 8.8 MG/DL (ref 8.7–10.5)
CHLORIDE SERPL-SCNC: 107 MMOL/L (ref 95–110)
CHOLEST SERPL-MCNC: 195 MG/DL (ref 120–199)
CHOLEST/HDLC SERPL: 3.8 {RATIO} (ref 2–5)
CO2 SERPL-SCNC: 25 MMOL/L (ref 23–29)
CREAT SERPL-MCNC: 1.1 MG/DL (ref 0.5–1.4)
EAG (OHS): 103 MG/DL (ref 68–131)
ERYTHROCYTE [DISTWIDTH] IN BLOOD BY AUTOMATED COUNT: 12.5 % (ref 11.5–14.5)
GFR SERPLBLD CREATININE-BSD FMLA CKD-EPI: >60 ML/MIN/1.73/M2
GLUCOSE SERPL-MCNC: 85 MG/DL (ref 70–110)
HBA1C MFR BLD: 5.2 % (ref 4–5.6)
HCT VFR BLD AUTO: 42.2 % (ref 40–54)
HDLC SERPL-MCNC: 52 MG/DL (ref 40–75)
HDLC SERPL: 26.7 % (ref 20–50)
HGB BLD-MCNC: 14 GM/DL (ref 14–18)
IMM GRANULOCYTES # BLD AUTO: 0.01 K/UL (ref 0–0.04)
IMM GRANULOCYTES NFR BLD AUTO: 0.2 % (ref 0–0.5)
LDLC SERPL CALC-MCNC: 125 MG/DL (ref 63–159)
LYMPHOCYTES # BLD AUTO: 1.95 K/UL (ref 1–4.8)
MCH RBC QN AUTO: 30.3 PG (ref 27–31)
MCHC RBC AUTO-ENTMCNC: 33.2 G/DL (ref 32–36)
MCV RBC AUTO: 91 FL (ref 82–98)
NONHDLC SERPL-MCNC: 143 MG/DL
NUCLEATED RBC (/100WBC) (OHS): 0 /100 WBC
PLATELET # BLD AUTO: 243 K/UL (ref 150–450)
PMV BLD AUTO: 9.8 FL (ref 9.2–12.9)
POTASSIUM SERPL-SCNC: 4 MMOL/L (ref 3.5–5.1)
PROT SERPL-MCNC: 6.8 GM/DL (ref 6–8.4)
PSA SERPL-MCNC: 2.47 NG/ML
RBC # BLD AUTO: 4.62 M/UL (ref 4.6–6.2)
RELATIVE EOSINOPHIL (OHS): 2.9 %
RELATIVE LYMPHOCYTE (OHS): 34.9 % (ref 18–48)
RELATIVE MONOCYTE (OHS): 8.4 % (ref 4–15)
RELATIVE NEUTROPHIL (OHS): 53.2 % (ref 38–73)
SODIUM SERPL-SCNC: 141 MMOL/L (ref 136–145)
TRIGL SERPL-MCNC: 90 MG/DL (ref 30–150)
WBC # BLD AUTO: 5.59 K/UL (ref 3.9–12.7)

## 2025-06-25 PROCEDURE — 80053 COMPREHEN METABOLIC PANEL: CPT

## 2025-06-25 PROCEDURE — 84153 ASSAY OF PSA TOTAL: CPT

## 2025-06-25 PROCEDURE — 85025 COMPLETE CBC W/AUTO DIFF WBC: CPT

## 2025-06-25 PROCEDURE — 80061 LIPID PANEL: CPT

## 2025-06-25 PROCEDURE — 36415 COLL VENOUS BLD VENIPUNCTURE: CPT | Mod: PO

## 2025-06-25 PROCEDURE — 83036 HEMOGLOBIN GLYCOSYLATED A1C: CPT

## 2025-06-25 NOTE — PROGRESS NOTES
Subjective:   Patient ID: Danny Chatman is a 65 y.o. male     Chief Complaint:Annual Exam      Here for checkup      Review of Systems   Respiratory:  Negative for shortness of breath.    Cardiovascular:  Negative for chest pain.   Gastrointestinal:  Negative for abdominal pain.   Genitourinary:  Negative for dysuria.     Past Medical History:   Diagnosis Date    Cataract     Choroidal nevus of left eye 01/25/2018    Colon polyp     Hyperlipidemia      Past Surgical History:   Procedure Laterality Date    ANTERIOR CRUCIATE LIGAMENT REPAIR Right 1999    ARTHROSCOPY OF ELBOW Right 11/17/2022    Procedure: ARTHROSCOPY, ELBOW-Loose body removal/debridement;  Surgeon: Alie Madison MD;  Location: [facility];  Service: Orthopedics;  Laterality: Right;    COLONOSCOPY N/A 12/6/2016    Procedure: COLONOSCOPY;  Surgeon: Lucho Vera MD;  Location: [facility];  Service: Endoscopy;  Laterality: N/A;    COLONOSCOPY N/A 12/14/2023    Procedure: COLONOSCOPY;  Surgeon: Perez Cotto MD;  Location: [facility];  Service: Endoscopy;  Laterality: N/A;    FINGER FRACTURE SURGERY      R index    SPERMATOCELECTOMY Left 5/17/2024    Procedure: EXCISION, SPERMATOCELE;  Surgeon: Evelina Cano MD;  Location: [facility];  Service: Urology;  Laterality: Left;    SYNOVECTOMY OF ELBOW Right 11/17/2022    Procedure: SYNOVECTOMY, ELBOW;  Surgeon: Alie Madison MD;  Location: [facility];  Service: Orthopedics;  Laterality: Right;    VASECTOMY  1989     Objective:     Vitals:    06/24/25 1459   BP: 128/68   Pulse: 64   Resp: 16   Temp: 97.8 °F (36.6 °C)     Body mass index is 28.1 kg/m².  Physical Exam  Vitals and nursing note reviewed.   Constitutional:       Appearance: He is well-developed.   HENT:      Head: Normocephalic and atraumatic.   Eyes:      General: No scleral icterus.     Conjunctiva/sclera: Conjunctivae normal.   Cardiovascular:      Heart sounds: No murmur heard.  Pulmonary:      Effort: Pulmonary effort is normal.  No respiratory distress.   Musculoskeletal:         General: No deformity. Normal range of motion.      Cervical back: Normal range of motion and neck supple.   Skin:     Coloration: Skin is not pale.      Findings: No rash.   Neurological:      Mental Status: He is alert and oriented to person, place, and time.   Psychiatric:         Behavior: Behavior normal.         Thought Content: Thought content normal.         Judgment: Judgment normal.       Assessment:     1. Healthcare maintenance    2. Macular degeneration, unspecified laterality, unspecified type    3. Screening for prostate cancer    4. Abnormal blood finding    5. Abnormal finding of blood chemistry, unspecified      Plan:   Healthcare maintenance  -     CBC Auto Differential; Future; Expected date: 06/24/2025  -     Comprehensive Metabolic Panel; Future; Expected date: 06/24/2025  -     Hemoglobin A1C; Future; Expected date: 06/24/2025  -     Lipid Panel; Future; Expected date: 06/24/2025    Macular degeneration, unspecified laterality, unspecified type  -     Ambulatory referral/consult to Ophthalmology; Future; Expected date: 07/01/2025  -     CBC Auto Differential; Future; Expected date: 06/24/2025  -     Comprehensive Metabolic Panel; Future; Expected date: 06/24/2025  -     Hemoglobin A1C; Future; Expected date: 06/24/2025  -     Lipid Panel; Future; Expected date: 06/24/2025    Screening for prostate cancer  -     PSA, Screening; Future; Expected date: 06/24/2025    Abnormal blood finding  -     Hemoglobin A1C; Future; Expected date: 06/24/2025    Abnormal finding of blood chemistry, unspecified  -     Lipid Panel; Future; Expected date: 06/24/2025      Chronic condition not otherwise mentioned is stable      Total time spent of Greater than 30 minutes minutes on the day of the visit.This includes face to face time and preparing to see the patient, obtaining and reviewing separately obtained history, documenting clinical information in the  electronic or other health record, independently interpreting results and communicating results to the patient/family/caregiver, or care coordinator.    Established patient with me has been instructed that must see me at least 1 time yearly (every 365 days) for refills of medications. Seeing other providers in this clinic is fine but expectation is to see me yearly.    Marvin Peñaloza MD  06/25/2025    Portions of this note have been dictated with MARIBEL Ribeiro

## 2025-06-26 ENCOUNTER — RESULTS FOLLOW-UP (OUTPATIENT)
Dept: FAMILY MEDICINE | Facility: CLINIC | Age: 65
End: 2025-06-26

## 2025-06-26 ENCOUNTER — PATIENT MESSAGE (OUTPATIENT)
Dept: FAMILY MEDICINE | Facility: CLINIC | Age: 65
End: 2025-06-26
Payer: MEDICARE

## (undated) DEVICE — KIT TRIMANO

## (undated) DEVICE — SOL IRR NACL .9% 3000ML

## (undated) DEVICE — GLOVE PROTEXIS LTX MICRO 8

## (undated) DEVICE — BLADE SHAVER 4.5 6/BX

## (undated) DEVICE — TOURNIQUET SB QC DP 18X4IN

## (undated) DEVICE — GOWN POLY REINF BRTH SLV LG

## (undated) DEVICE — BANDAGE MATRIX HK LOOP 4IN 5YD

## (undated) DEVICE — SOL POVIDONE PREP IODINE 4 OZ

## (undated) DEVICE — TOWEL OR DISP STRL BLUE 4/PK

## (undated) DEVICE — PACK CUSTOM UNIV BASIN SLI

## (undated) DEVICE — ELECTRODE BLADE INSULATED 1 IN

## (undated) DEVICE — PROBE ARTHO ENERGY 90 DEG

## (undated) DEVICE — APPLICATOR CHLORAPREP ORN 26ML

## (undated) DEVICE — GAUZE SPONGE 4X4 12PLY

## (undated) DEVICE — SET INFLOW TUBE ARTHSCP

## (undated) DEVICE — SOL POVIDONE SCRUB IODINE 4 OZ

## (undated) DEVICE — TRAY SKIN SCRUB DRY PREMIUM

## (undated) DEVICE — GREAT WHITE STEALTH

## (undated) DEVICE — SUT 2-0 12-18IN SILK

## (undated) DEVICE — DRESSING N ADH OIL EMUL 3X3

## (undated) DEVICE — SUT MONOCRYL 4-0 PS-2

## (undated) DEVICE — SLEEVE SCD EXPRESS KNEE MEDIUM

## (undated) DEVICE — SPONGE BULKEE II ABSRB 6X6.75

## (undated) DEVICE — TUBING SUC UNIV W/CONN 12FT

## (undated) DEVICE — BANDAGE ROLL COTTN 4.5INX4.1YD

## (undated) DEVICE — STRAP OR TABLE 5IN X 72IN

## (undated) DEVICE — NDL ANES SPINAL 18X3.5ST 18G

## (undated) DEVICE — Device

## (undated) DEVICE — COVER PROXIMA MAYO STAND

## (undated) DEVICE — ELECTRODE COOLPULSE 90 W/HAND

## (undated) DEVICE — PAD CAST SPECIALIST STRL 4

## (undated) DEVICE — SOL NACL IRR 1000ML BTL

## (undated) DEVICE — GOWN ECLIPSE REINF LV4 XLNG XL

## (undated) DEVICE — GOWN POLY REINF BRTH SLV XL

## (undated) DEVICE — BLADE 4IN EDGE INSULATED

## (undated) DEVICE — GLOVE PROTEXIS LIGHT BROWN 8.5

## (undated) DEVICE — TRIMANO ELBOW KIT

## (undated) DEVICE — SUPPORTER ADLT A3 3 IN. WB LA

## (undated) DEVICE — SUT ETHILON 3-0 PS2 18 BLK

## (undated) DEVICE — ADHESIVE DERMABOND ADVANCED

## (undated) DEVICE — SUT VICRYL 3-0 27 SH

## (undated) DEVICE — DRAPE STERI U-SHAPED 47X51IN

## (undated) DEVICE — SUT CHROMIC 3-0 SH 27IN GUT

## (undated) DEVICE — DRAPE INCISE IOBAN 2 23X17IN

## (undated) DEVICE — BNDG COFLEX FOAM LF2 ST 6X5YD

## (undated) DEVICE — GOWN POLY REINF X-LONG 2XL

## (undated) DEVICE — GLOVE SENSICARE PI GRN 6.5

## (undated) DEVICE — DRESSING ADAPTIC N ADH 3X8IN

## (undated) DEVICE — GLOVE SENSICARE PI ALOE 6.5

## (undated) DEVICE — DRAPE MINOR FEN 98X77X121IN

## (undated) DEVICE — ELECTRODE REM PLYHSV RETURN 9

## (undated) DEVICE — SLING ARM X-LARGE FOAM STRAP

## (undated) DEVICE — BURR OVAL CUTTING 6 MM

## (undated) DEVICE — SET EXT W/2 VLVE PORTS 40

## (undated) DEVICE — SUT MONOCRYL 4-0 SH UND MON

## (undated) DEVICE — SHAVER SYS 5.5 ULRAFRR

## (undated) DEVICE — BLADE CLIPPER SENICLIP SURGICA

## (undated) DEVICE — SOL 9P NACL IRR PIC IL